# Patient Record
Sex: MALE | Race: ASIAN | NOT HISPANIC OR LATINO
[De-identification: names, ages, dates, MRNs, and addresses within clinical notes are randomized per-mention and may not be internally consistent; named-entity substitution may affect disease eponyms.]

---

## 2019-02-19 ENCOUNTER — TRANSCRIPTION ENCOUNTER (OUTPATIENT)
Age: 59
End: 2019-02-19

## 2022-01-07 ENCOUNTER — TRANSCRIPTION ENCOUNTER (OUTPATIENT)
Age: 62
End: 2022-01-07

## 2022-10-27 ENCOUNTER — INPATIENT (INPATIENT)
Facility: HOSPITAL | Age: 62
LOS: 5 days | Discharge: HOME | End: 2022-11-02
Attending: STUDENT IN AN ORGANIZED HEALTH CARE EDUCATION/TRAINING PROGRAM | Admitting: STUDENT IN AN ORGANIZED HEALTH CARE EDUCATION/TRAINING PROGRAM
Payer: COMMERCIAL

## 2022-10-27 VITALS
SYSTOLIC BLOOD PRESSURE: 129 MMHG | HEART RATE: 98 BPM | OXYGEN SATURATION: 100 % | DIASTOLIC BLOOD PRESSURE: 75 MMHG | TEMPERATURE: 98 F | RESPIRATION RATE: 18 BRPM

## 2022-10-27 PROCEDURE — 99285 EMERGENCY DEPT VISIT HI MDM: CPT

## 2022-10-27 NOTE — ED ADULT TRIAGE NOTE - CHIEF COMPLAINT QUOTE
As per daughter to translate, pt c/o vomiting and diarrhea with abdominal pain since his last chemo treatment. c/o generalized weakness, unable to tolerate PO intake and urinary frequency. Daughter states pt was told his liver enzymes were elevated, appears more jaundice. Last chemo treatment 10/15. Hx of non hodgkin's lymphoma stage IV

## 2022-10-28 DIAGNOSIS — C85.90 NON-HODGKIN LYMPHOMA, UNSPECIFIED, UNSPECIFIED SITE: ICD-10-CM

## 2022-10-28 DIAGNOSIS — R62.7 ADULT FAILURE TO THRIVE: ICD-10-CM

## 2022-10-28 DIAGNOSIS — Z86.19 PERSONAL HISTORY OF OTHER INFECTIOUS AND PARASITIC DISEASES: ICD-10-CM

## 2022-10-28 DIAGNOSIS — Z29.9 ENCOUNTER FOR PROPHYLACTIC MEASURES, UNSPECIFIED: ICD-10-CM

## 2022-10-28 DIAGNOSIS — E87.1 HYPO-OSMOLALITY AND HYPONATREMIA: ICD-10-CM

## 2022-10-28 LAB
ALBUMIN SERPL ELPH-MCNC: 3.8 G/DL — SIGNIFICANT CHANGE UP (ref 3.3–5)
ALP SERPL-CCNC: 160 U/L — HIGH (ref 40–120)
ALT FLD-CCNC: 24 U/L — SIGNIFICANT CHANGE UP (ref 4–41)
ANION GAP SERPL CALC-SCNC: 13 MMOL/L — SIGNIFICANT CHANGE UP (ref 7–14)
ANISOCYTOSIS BLD QL: SLIGHT — SIGNIFICANT CHANGE UP
APPEARANCE UR: CLEAR — SIGNIFICANT CHANGE UP
AST SERPL-CCNC: 18 U/L — SIGNIFICANT CHANGE UP (ref 4–40)
B PERT DNA SPEC QL NAA+PROBE: SIGNIFICANT CHANGE UP
B PERT+PARAPERT DNA PNL SPEC NAA+PROBE: SIGNIFICANT CHANGE UP
BACTERIA # UR AUTO: NEGATIVE — SIGNIFICANT CHANGE UP
BASE EXCESS BLDV CALC-SCNC: -0.8 MMOL/L — SIGNIFICANT CHANGE UP (ref -2–3)
BASOPHILS # BLD AUTO: 0 K/UL — SIGNIFICANT CHANGE UP (ref 0–0.2)
BASOPHILS NFR BLD AUTO: 0 % — SIGNIFICANT CHANGE UP (ref 0–2)
BILIRUB SERPL-MCNC: 0.5 MG/DL — SIGNIFICANT CHANGE UP (ref 0.2–1.2)
BILIRUB UR-MCNC: NEGATIVE — SIGNIFICANT CHANGE UP
BLOOD GAS VENOUS COMPREHENSIVE RESULT: SIGNIFICANT CHANGE UP
BORDETELLA PARAPERTUSSIS (RAPRVP): SIGNIFICANT CHANGE UP
BUN SERPL-MCNC: 14 MG/DL — SIGNIFICANT CHANGE UP (ref 7–23)
C PNEUM DNA SPEC QL NAA+PROBE: SIGNIFICANT CHANGE UP
CALCIUM SERPL-MCNC: 9.5 MG/DL — SIGNIFICANT CHANGE UP (ref 8.4–10.5)
CHLORIDE BLDV-SCNC: 100 MMOL/L — SIGNIFICANT CHANGE UP (ref 96–108)
CHLORIDE SERPL-SCNC: 96 MMOL/L — LOW (ref 98–107)
CO2 BLDV-SCNC: 25.6 MMOL/L — SIGNIFICANT CHANGE UP (ref 22–26)
CO2 SERPL-SCNC: 23 MMOL/L — SIGNIFICANT CHANGE UP (ref 22–31)
COLOR SPEC: YELLOW — SIGNIFICANT CHANGE UP
CREAT SERPL-MCNC: 1.07 MG/DL — SIGNIFICANT CHANGE UP (ref 0.5–1.3)
DIFF PNL FLD: NEGATIVE — SIGNIFICANT CHANGE UP
EGFR: 77 ML/MIN/1.73M2 — SIGNIFICANT CHANGE UP
EOSINOPHIL # BLD AUTO: 3.37 K/UL — HIGH (ref 0–0.5)
EOSINOPHIL NFR BLD AUTO: 45.6 % — HIGH (ref 0–6)
EPI CELLS # UR: 1 /HPF — SIGNIFICANT CHANGE UP (ref 0–5)
FLUAV SUBTYP SPEC NAA+PROBE: SIGNIFICANT CHANGE UP
FLUBV RNA SPEC QL NAA+PROBE: SIGNIFICANT CHANGE UP
GAS PNL BLDV: 131 MMOL/L — LOW (ref 136–145)
GLUCOSE BLDV-MCNC: 101 MG/DL — HIGH (ref 70–99)
GLUCOSE SERPL-MCNC: 105 MG/DL — HIGH (ref 70–99)
GLUCOSE UR QL: NEGATIVE — SIGNIFICANT CHANGE UP
HADV DNA SPEC QL NAA+PROBE: SIGNIFICANT CHANGE UP
HCO3 BLDV-SCNC: 24 MMOL/L — SIGNIFICANT CHANGE UP (ref 22–29)
HCOV 229E RNA SPEC QL NAA+PROBE: SIGNIFICANT CHANGE UP
HCOV HKU1 RNA SPEC QL NAA+PROBE: SIGNIFICANT CHANGE UP
HCOV NL63 RNA SPEC QL NAA+PROBE: SIGNIFICANT CHANGE UP
HCOV OC43 RNA SPEC QL NAA+PROBE: SIGNIFICANT CHANGE UP
HCT VFR BLD CALC: 41.1 % — SIGNIFICANT CHANGE UP (ref 39–50)
HCT VFR BLDA CALC: 38 % — LOW (ref 39–51)
HGB BLD CALC-MCNC: 12.8 G/DL — LOW (ref 13–17)
HGB BLD-MCNC: 13.2 G/DL — SIGNIFICANT CHANGE UP (ref 13–17)
HMPV RNA SPEC QL NAA+PROBE: SIGNIFICANT CHANGE UP
HPIV1 RNA SPEC QL NAA+PROBE: SIGNIFICANT CHANGE UP
HPIV2 RNA SPEC QL NAA+PROBE: SIGNIFICANT CHANGE UP
HPIV3 RNA SPEC QL NAA+PROBE: SIGNIFICANT CHANGE UP
HPIV4 RNA SPEC QL NAA+PROBE: SIGNIFICANT CHANGE UP
IANC: 2.83 K/UL — SIGNIFICANT CHANGE UP (ref 1.8–7.4)
KETONES UR-MCNC: NEGATIVE — SIGNIFICANT CHANGE UP
LACTATE BLDV-MCNC: 2 MMOL/L — SIGNIFICANT CHANGE UP (ref 0.5–2)
LEUKOCYTE ESTERASE UR-ACNC: NEGATIVE — SIGNIFICANT CHANGE UP
LIDOCAIN IGE QN: 34 U/L — SIGNIFICANT CHANGE UP (ref 7–60)
LYMPHOCYTES # BLD AUTO: 0.26 K/UL — LOW (ref 1–3.3)
LYMPHOCYTES # BLD AUTO: 3.5 % — LOW (ref 13–44)
M PNEUMO DNA SPEC QL NAA+PROBE: SIGNIFICANT CHANGE UP
MACROCYTES BLD QL: SLIGHT — SIGNIFICANT CHANGE UP
MANUAL SMEAR VERIFICATION: SIGNIFICANT CHANGE UP
MCHC RBC-ENTMCNC: 28.3 PG — SIGNIFICANT CHANGE UP (ref 27–34)
MCHC RBC-ENTMCNC: 32.1 GM/DL — SIGNIFICANT CHANGE UP (ref 32–36)
MCV RBC AUTO: 88.2 FL — SIGNIFICANT CHANGE UP (ref 80–100)
MONOCYTES # BLD AUTO: 1.3 K/UL — HIGH (ref 0–0.9)
MONOCYTES NFR BLD AUTO: 17.6 % — HIGH (ref 2–14)
MYELOCYTES NFR BLD: 0.9 % — HIGH (ref 0–0)
NEUTROPHILS # BLD AUTO: 2.2 K/UL — SIGNIFICANT CHANGE UP (ref 1.8–7.4)
NEUTROPHILS NFR BLD AUTO: 21.9 % — LOW (ref 43–77)
NEUTS BAND # BLD: 7.9 % — HIGH (ref 0–6)
NITRITE UR-MCNC: NEGATIVE — SIGNIFICANT CHANGE UP
OVALOCYTES BLD QL SMEAR: SLIGHT — SIGNIFICANT CHANGE UP
PCO2 BLDV: 41 MMHG — LOW (ref 42–55)
PH BLDV: 7.38 — SIGNIFICANT CHANGE UP (ref 7.32–7.43)
PH UR: 6.5 — SIGNIFICANT CHANGE UP (ref 5–8)
PLAT MORPH BLD: NORMAL — SIGNIFICANT CHANGE UP
PLATELET # BLD AUTO: 291 K/UL — SIGNIFICANT CHANGE UP (ref 150–400)
PLATELET COUNT - ESTIMATE: NORMAL — SIGNIFICANT CHANGE UP
PO2 BLDV: 30 MMHG — SIGNIFICANT CHANGE UP
POIKILOCYTOSIS BLD QL AUTO: SLIGHT — SIGNIFICANT CHANGE UP
POLYCHROMASIA BLD QL SMEAR: SLIGHT — SIGNIFICANT CHANGE UP
POTASSIUM BLDV-SCNC: 4.1 MMOL/L — SIGNIFICANT CHANGE UP (ref 3.5–5.1)
POTASSIUM SERPL-MCNC: 3.8 MMOL/L — SIGNIFICANT CHANGE UP (ref 3.5–5.3)
POTASSIUM SERPL-SCNC: 3.8 MMOL/L — SIGNIFICANT CHANGE UP (ref 3.5–5.3)
PROT SERPL-MCNC: 7.2 G/DL — SIGNIFICANT CHANGE UP (ref 6–8.3)
PROT UR-MCNC: ABNORMAL
RAPID RVP RESULT: SIGNIFICANT CHANGE UP
RBC # BLD: 4.66 M/UL — SIGNIFICANT CHANGE UP (ref 4.2–5.8)
RBC # FLD: 15.8 % — HIGH (ref 10.3–14.5)
RBC BLD AUTO: ABNORMAL
RBC CASTS # UR COMP ASSIST: 1 /HPF — SIGNIFICANT CHANGE UP (ref 0–4)
RSV RNA SPEC QL NAA+PROBE: SIGNIFICANT CHANGE UP
RV+EV RNA SPEC QL NAA+PROBE: SIGNIFICANT CHANGE UP
SAO2 % BLDV: 38.2 % — SIGNIFICANT CHANGE UP
SARS-COV-2 RNA SPEC QL NAA+PROBE: SIGNIFICANT CHANGE UP
SODIUM SERPL-SCNC: 132 MMOL/L — LOW (ref 135–145)
SP GR SPEC: >1.05 (ref 1.01–1.05)
UROBILINOGEN FLD QL: SIGNIFICANT CHANGE UP
VARIANT LYMPHS # BLD: 2.6 % — SIGNIFICANT CHANGE UP (ref 0–6)
WBC # BLD: 7.39 K/UL — SIGNIFICANT CHANGE UP (ref 3.8–10.5)
WBC # FLD AUTO: 7.39 K/UL — SIGNIFICANT CHANGE UP (ref 3.8–10.5)
WBC UR QL: 1 /HPF — SIGNIFICANT CHANGE UP (ref 0–5)

## 2022-10-28 PROCEDURE — 74177 CT ABD & PELVIS W/CONTRAST: CPT | Mod: 26,MA

## 2022-10-28 PROCEDURE — 99223 1ST HOSP IP/OBS HIGH 75: CPT

## 2022-10-28 PROCEDURE — 71046 X-RAY EXAM CHEST 2 VIEWS: CPT | Mod: 26

## 2022-10-28 RX ORDER — ONDANSETRON 8 MG/1
4 TABLET, FILM COATED ORAL EVERY 8 HOURS
Refills: 0 | Status: DISCONTINUED | OUTPATIENT
Start: 2022-10-28 | End: 2022-11-02

## 2022-10-28 RX ORDER — SODIUM CHLORIDE 9 MG/ML
1000 INJECTION INTRAMUSCULAR; INTRAVENOUS; SUBCUTANEOUS
Refills: 0 | Status: DISCONTINUED | OUTPATIENT
Start: 2022-10-28 | End: 2022-10-30

## 2022-10-28 RX ORDER — OXYBUTYNIN CHLORIDE 5 MG
5 TABLET ORAL DAILY
Refills: 0 | Status: DISCONTINUED | OUTPATIENT
Start: 2022-10-28 | End: 2022-11-02

## 2022-10-28 RX ORDER — SENNA PLUS 8.6 MG/1
2 TABLET ORAL AT BEDTIME
Refills: 0 | Status: DISCONTINUED | OUTPATIENT
Start: 2022-10-28 | End: 2022-10-28

## 2022-10-28 RX ORDER — ACETAMINOPHEN 500 MG
650 TABLET ORAL EVERY 6 HOURS
Refills: 0 | Status: DISCONTINUED | OUTPATIENT
Start: 2022-10-28 | End: 2022-11-02

## 2022-10-28 RX ORDER — ENTECAVIR 0.5 MG/1
0.5 TABLET ORAL DAILY
Refills: 0 | Status: DISCONTINUED | OUTPATIENT
Start: 2022-10-28 | End: 2022-11-02

## 2022-10-28 RX ORDER — ENOXAPARIN SODIUM 100 MG/ML
40 INJECTION SUBCUTANEOUS EVERY 24 HOURS
Refills: 0 | Status: DISCONTINUED | OUTPATIENT
Start: 2022-10-28 | End: 2022-11-02

## 2022-10-28 RX ORDER — TAMSULOSIN HYDROCHLORIDE 0.4 MG/1
0.4 CAPSULE ORAL AT BEDTIME
Refills: 0 | Status: DISCONTINUED | OUTPATIENT
Start: 2022-10-28 | End: 2022-11-02

## 2022-10-28 RX ORDER — SODIUM CHLORIDE 9 MG/ML
1000 INJECTION INTRAMUSCULAR; INTRAVENOUS; SUBCUTANEOUS ONCE
Refills: 0 | Status: COMPLETED | OUTPATIENT
Start: 2022-10-28 | End: 2022-10-28

## 2022-10-28 RX ORDER — FAMOTIDINE 10 MG/ML
20 INJECTION INTRAVENOUS DAILY
Refills: 0 | Status: DISCONTINUED | OUTPATIENT
Start: 2022-10-28 | End: 2022-11-02

## 2022-10-28 RX ORDER — ATORVASTATIN CALCIUM 80 MG/1
20 TABLET, FILM COATED ORAL AT BEDTIME
Refills: 0 | Status: DISCONTINUED | OUTPATIENT
Start: 2022-10-28 | End: 2022-11-02

## 2022-10-28 RX ORDER — AMITRIPTYLINE HCL 25 MG
10 TABLET ORAL DAILY
Refills: 0 | Status: DISCONTINUED | OUTPATIENT
Start: 2022-10-28 | End: 2022-11-02

## 2022-10-28 RX ORDER — OXYBUTYNIN CHLORIDE 5 MG
5 TABLET ORAL DAILY
Refills: 0 | Status: DISCONTINUED | OUTPATIENT
Start: 2022-10-28 | End: 2022-10-28

## 2022-10-28 RX ORDER — ACYCLOVIR SODIUM 500 MG
400 VIAL (EA) INTRAVENOUS
Refills: 0 | Status: DISCONTINUED | OUTPATIENT
Start: 2022-10-28 | End: 2022-11-02

## 2022-10-28 RX ADMIN — ENTECAVIR 0.5 MILLIGRAM(S): 0.5 TABLET ORAL at 13:20

## 2022-10-28 RX ADMIN — Medication 650 MILLIGRAM(S): at 09:20

## 2022-10-28 RX ADMIN — Medication 650 MILLIGRAM(S): at 17:58

## 2022-10-28 RX ADMIN — SODIUM CHLORIDE 75 MILLILITER(S): 9 INJECTION INTRAMUSCULAR; INTRAVENOUS; SUBCUTANEOUS at 10:48

## 2022-10-28 RX ADMIN — Medication 400 MILLIGRAM(S): at 17:15

## 2022-10-28 RX ADMIN — ONDANSETRON 4 MILLIGRAM(S): 8 TABLET, FILM COATED ORAL at 17:58

## 2022-10-28 RX ADMIN — ATORVASTATIN CALCIUM 20 MILLIGRAM(S): 80 TABLET, FILM COATED ORAL at 22:19

## 2022-10-28 RX ADMIN — TAMSULOSIN HYDROCHLORIDE 0.4 MILLIGRAM(S): 0.4 CAPSULE ORAL at 22:19

## 2022-10-28 RX ADMIN — Medication 30 MILLILITER(S): at 09:20

## 2022-10-28 RX ADMIN — FAMOTIDINE 20 MILLIGRAM(S): 10 INJECTION INTRAVENOUS at 11:28

## 2022-10-28 RX ADMIN — Medication 5 MILLIGRAM(S): at 13:20

## 2022-10-28 RX ADMIN — SODIUM CHLORIDE 1000 MILLILITER(S): 9 INJECTION INTRAMUSCULAR; INTRAVENOUS; SUBCUTANEOUS at 01:02

## 2022-10-28 RX ADMIN — ENOXAPARIN SODIUM 40 MILLIGRAM(S): 100 INJECTION SUBCUTANEOUS at 11:28

## 2022-10-28 RX ADMIN — Medication 650 MILLIGRAM(S): at 09:50

## 2022-10-28 NOTE — H&P ADULT - PROBLEM SELECTOR PLAN 2
CT of abdomen showed Multifocal homogeneous soft tissue masses involving the upper para-aortic retroperitoneum and central mesentery with encasement of regional vessels. Lymphoma is a primary differential diagnostic consideration.  Non Hodgkin's Lymphoma on Rituxan chemotherapy treatment #7 two weeks ago.  Oncology consult for followup  Followup with oncologist Dr Cantu as outpatient.

## 2022-10-28 NOTE — ED ADULT NURSE NOTE - CHIEF COMPLAINT

## 2022-10-28 NOTE — H&P ADULT - PROBLEM SELECTOR PLAN 4
- Na 132 - mild hyponatremia  - IVF hydration  - Strict I's & O's  - Free water restriction.   - Check urine studies (Na, Cr, Osm) check serum osmolality if worsens.  - will monitor and trend Na level. - Na 132 - mild hyponatremia  - gentle IVF hydration  - Check urine studies (Na, Cr, Osm) check serum osmolality if worsens.  - will monitor and trend Na level.

## 2022-10-28 NOTE — H&P ADULT - NS ATTEND AMEND GEN_ALL_CORE FT
61 y/o male with PMH of Non Hodgkin's Lymphoma on Rituxan chemotherapy treatment #7 two weeks ago, HBV, TIA  presenting with vomiting and decrease appetite. Patient admitted for management of failure to thrive.    Patient see flex examined with daughter at bedside. Reports symptoms started after the 6th round of chemo but never recover back to baseline prior to 7th chemo. Symptoms worsened after the 7th chemo. Loose stool now improving, only had 1 since arriving to ED yesterday afternoon. No fever, chills, cough, dysuria. Ate some crackers this morning and was okay. Denies dysphagia. Just poor appetite. Mild abd pain on palpation on the LLQ. No rebound tenderness    #Failure to thrive: likely in the setting of chemo. ROS only noted for loose BMs, which is now improving. If worsened, would send off stool studies. Nutrition consult. Encourage PO intake. Famotidine given bloating and hx of GERD.    #Non-hodgkin lymphoma: s/p 7th rituxan two weeks ago. Per family, plan for next rituxan in december. Outpt onc f/u    #hx of hep b: on entecavir. elevated alk koki. LFTs wnl. Follows with hepatology outpt    #Hyponatremia: possibly 2/2 dehydration. s/p IVF. Check urine studies if persisted or worsened. Monitor Na 63 y/o male with PMH of Non Hodgkin's Lymphoma on Rituxan chemotherapy treatment #7 two weeks ago, HBV, TIA  presenting with vomiting and decrease appetite. Patient admitted for management of failure to thrive.    Patient see flex examined with daughter at bedside. Reports symptoms started after the 6th round of chemo but never recover back to baseline prior to 7th chemo. Symptoms worsened after the 7th chemo. Loose stool now improving, only had 1 since arriving to ED yesterday afternoon. No fever, chills, cough, dysuria. Ate some crackers this morning and was okay. Denies dysphagia. Just poor appetite. Mild abd pain on palpation on the LLQ. No rebound tenderness    #Failure to thrive: likely in the setting of chemo. ROS only noted for loose BMs, which is now improving. If worsened, would send off stool studies. Nutrition consult. Encourage PO intake. Famotidine given bloating and hx of GERD.    #Non-hodgkin lymphoma: s/p 7th rituxan two weeks ago. Per family, plan for next rituxan in december. Per daughter, the lymphoma mass is responding to treatment and has reduced in size significantly. Outpt onc f/u    #hx of hep b: on entecavir. elevated alk koki. LFTs wnl. Follows with hepatology outpt    #Hyponatremia: possibly 2/2 dehydration. s/p IVF. Check urine studies if persisted or worsened. Monitor Na 63 y/o male with PMH of Non Hodgkin's Lymphoma on Rituxan chemotherapy treatment #7 two weeks ago, HBV, TIA  presenting with vomiting and decrease appetite. Patient admitted for management of failure to thrive.    Patient see flex examined with daughter at bedside. Reports symptoms started after the 6th round of chemo but never recover back to baseline prior to 7th chemo. Symptoms worsened after the 7th chemo. Loose stool now improving, only had 1 since arriving to ED yesterday afternoon. No fever, chills, cough, dysuria. Ate some crackers this morning and was okay. Denies dysphagia. Just poor appetite. Mild abd pain on palpation on the LLQ. No rebound tenderness    #Failure to thrive: likely in the setting of chemo. ROS only noted for loose BMs, which is now improving. If worsened, would send off stool studies. Nutrition consult. Encourage PO intake. Famotidine given bloating and hx of GERD.    #Non-hodgkin lymphoma: s/p 7th rituxan two weeks ago. Per family, plan for next rituxan in december. Per daughter, the lymphoma mass is responding to treatment and has reduced in size significantly. CT A&P result reviewed. Will try to obtain outpt record for comparison. Outpt onc f/u    #hx of hep b: on entecavir. elevated alk koki. LFTs wnl. Follows with hepatology outpt    #Hyponatremia: possibly 2/2 dehydration. s/p IVF. Check urine studies if persisted or worsened. Monitor Na

## 2022-10-28 NOTE — ED ADULT NURSE REASSESSMENT NOTE - NS ED NURSE REASSESS COMMENT FT1
Received report from KUSHAL Rosado for patient in room 14. Patient is A&Ox4 and ambulatory at baseline. VSS, patient endorses generalized abdominal pain, rates pain 5/10. Bowel sounds are normoactive, abdomen is soft and non-distended, patient denies nausea/vomitting and gas. MD aware, medications administered as prescribed. Will continue to monitor.
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
## 2022-10-28 NOTE — ED ADULT NURSE NOTE - OBJECTIVE STATEMENT
break coverage RN - Pt received in room 14, A&OX3, daughter at bedside for translation. pt c/o vomiting and diarrhea with abdominal pain since his last chemo treatment on 10/15. Also c/o generalized weakness, unable to tolerate PO intake and urinary frequency. Daughter states pt was told his liver enzymes were elevated last time he had blood work drawn, and that he appears more jaundice than usual. Abd soft non distended. Resp even and unlabored. 20G IV placed to L AC. Labs drawn and sent. Will continue to monitor.
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
## 2022-10-28 NOTE — ED PROVIDER NOTE - PROGRESS NOTE DETAILS
Mehul Ramirez MD  CTAP showed Multifocal homogeneous soft tissue masses involving the upper para-aortic   retroperitoneum and central mesentery with encasement of regional   vessels. Labs and CT not suggestive of colitis or other infectious etiology. Patient has been feeling he has been continually declining over the past month, he was able to work with some functioning up until the 6th chemo treatment. He and his daughter are concerned about his decline and level of weakness and inability to take in PO. Will seek medicine admit.

## 2022-10-28 NOTE — ED PROVIDER NOTE - NS ED ROS FT
General: WEAKNESS, denies fever, chills  HENT: denies nasal congestion, rhinorrhea  Eyes: denies visual changes, blurred vision  CV: denies chest pain, palpitations  Resp: SOB, denies cough  Abdominal: ABD PAIN, NAUSEA, VOMITING, DIARRHEA  : INCREASED URINARY FREQUENCY, denies urinary pain or discharge, hematuria  MSK: denies muscle aches, leg swelling  Neuro: denies headaches, numbness, tingling  Skin: denies rashes, bruises

## 2022-10-28 NOTE — H&P ADULT - ASSESSMENT
63 y/o male with PMH of Non Hodgkin's Lymphoma on Rituxan chemotherapy treatment #7 two weeks ago, HBV, TIA  presenting with vomiting and decrease appetite. Patient admitted for management of failure to thrive.    In ED, CT of abdomen showed Multifocal homogeneous soft tissue masses involving the upper para-aortic retroperitoneum and central mesentery with encasement of regional vessels. Lymphoma is a primary differential diagnostic consideration,   however other etiologies are not excluded.

## 2022-10-28 NOTE — H&P ADULT - PROBLEM SELECTOR PLAN 1
Patient with decrease appetite, decrease oral intake  Encourage oral intake with fluids and advance as tolerated  Nutritional support with supplements  Calorie count  IVF hydration until oral intake increases.

## 2022-10-28 NOTE — H&P ADULT - PROBLEM SELECTOR PLAN 5
DVT prophylaxis Lovenox 40 mg SC QD.  Will check stool GI PCR and culture if loose stool. DVT prophylaxis Lovenox 40 mg SC QD.  Will check stool GI PCR and culture if loose stool worsened or persist

## 2022-10-28 NOTE — ED PROVIDER NOTE - PHYSICAL EXAMINATION
GENERAL: UNCOMFORTABLE APPEARING  HEAD: normocephalic, atraumatic  HEENT: normal conjunctiva, oral mucosa moist, uvula midline, no tonsilar exudates, neck supple, no JVD  CARDIAC: regular rate and rhythm, normal S1S2, no appreciable murmurs, 2+ pulses in UE/LE b/l  PULM: normal breath sounds, clear to ascultation bilaterally, no rales, rhonchi, wheezing  GI: ABD TENDER, abdomen nondistended, soft, no guarding, rebound tenderness  : no CVA tenderness b/l, no suprapubic tenderness  NEURO: no focal motor or sensory deficits, normal speech, PERRLA, EOMI, AAOx3  MSK: no peripheral edema, no calf tenderness b/l  SKIN: well-perfused, extremities warm, no visible rashes  PSYCH: appropriate mood and affect
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
## 2022-10-29 DIAGNOSIS — A41.9 SEPSIS, UNSPECIFIED ORGANISM: ICD-10-CM

## 2022-10-29 LAB
A1C WITH ESTIMATED AVERAGE GLUCOSE RESULT: 5.9 % — HIGH (ref 4–5.6)
ALBUMIN SERPL ELPH-MCNC: 4.1 G/DL — SIGNIFICANT CHANGE UP (ref 3.3–5)
ALP SERPL-CCNC: 211 U/L — HIGH (ref 40–120)
ALT FLD-CCNC: 33 U/L — SIGNIFICANT CHANGE UP (ref 4–41)
ANION GAP SERPL CALC-SCNC: 13 MMOL/L — SIGNIFICANT CHANGE UP (ref 7–14)
ANION GAP SERPL CALC-SCNC: 18 MMOL/L — HIGH (ref 7–14)
AST SERPL-CCNC: 22 U/L — SIGNIFICANT CHANGE UP (ref 4–40)
BILIRUB SERPL-MCNC: 0.8 MG/DL — SIGNIFICANT CHANGE UP (ref 0.2–1.2)
BLOOD GAS VENOUS COMPREHENSIVE RESULT: SIGNIFICANT CHANGE UP
BUN SERPL-MCNC: 14 MG/DL — SIGNIFICANT CHANGE UP (ref 7–23)
BUN SERPL-MCNC: 14 MG/DL — SIGNIFICANT CHANGE UP (ref 7–23)
CALCIUM SERPL-MCNC: 9.2 MG/DL — SIGNIFICANT CHANGE UP (ref 8.4–10.5)
CALCIUM SERPL-MCNC: 9.6 MG/DL — SIGNIFICANT CHANGE UP (ref 8.4–10.5)
CHLORIDE SERPL-SCNC: 101 MMOL/L — SIGNIFICANT CHANGE UP (ref 98–107)
CHLORIDE SERPL-SCNC: 98 MMOL/L — SIGNIFICANT CHANGE UP (ref 98–107)
CHOLEST SERPL-MCNC: 82 MG/DL — SIGNIFICANT CHANGE UP
CO2 SERPL-SCNC: 18 MMOL/L — LOW (ref 22–31)
CO2 SERPL-SCNC: 21 MMOL/L — LOW (ref 22–31)
CREAT SERPL-MCNC: 1.17 MG/DL — SIGNIFICANT CHANGE UP (ref 0.5–1.3)
CREAT SERPL-MCNC: 1.19 MG/DL — SIGNIFICANT CHANGE UP (ref 0.5–1.3)
EGFR: 68 ML/MIN/1.73M2 — SIGNIFICANT CHANGE UP
EGFR: 70 ML/MIN/1.73M2 — SIGNIFICANT CHANGE UP
ESTIMATED AVERAGE GLUCOSE: 123 — SIGNIFICANT CHANGE UP
GLUCOSE SERPL-MCNC: 100 MG/DL — HIGH (ref 70–99)
GLUCOSE SERPL-MCNC: 116 MG/DL — HIGH (ref 70–99)
HCT VFR BLD CALC: 36.7 % — LOW (ref 39–50)
HCT VFR BLD CALC: 38.2 % — LOW (ref 39–50)
HCT VFR BLD CALC: 41.3 % — SIGNIFICANT CHANGE UP (ref 39–50)
HCV AB S/CO SERPL IA: 0.06 S/CO — SIGNIFICANT CHANGE UP (ref 0–0.99)
HCV AB SERPL-IMP: SIGNIFICANT CHANGE UP
HDLC SERPL-MCNC: 21 MG/DL — LOW
HGB BLD-MCNC: 12 G/DL — LOW (ref 13–17)
HGB BLD-MCNC: 12.2 G/DL — LOW (ref 13–17)
HGB BLD-MCNC: 13 G/DL — SIGNIFICANT CHANGE UP (ref 13–17)
LACTATE SERPL-SCNC: 1 MMOL/L — SIGNIFICANT CHANGE UP (ref 0.5–2)
LIPID PNL WITH DIRECT LDL SERPL: 31 MG/DL — SIGNIFICANT CHANGE UP
MAGNESIUM SERPL-MCNC: 2.1 MG/DL — SIGNIFICANT CHANGE UP (ref 1.6–2.6)
MAGNESIUM SERPL-MCNC: 2.1 MG/DL — SIGNIFICANT CHANGE UP (ref 1.6–2.6)
MCHC RBC-ENTMCNC: 27.3 PG — SIGNIFICANT CHANGE UP (ref 27–34)
MCHC RBC-ENTMCNC: 28 PG — SIGNIFICANT CHANGE UP (ref 27–34)
MCHC RBC-ENTMCNC: 28.2 PG — SIGNIFICANT CHANGE UP (ref 27–34)
MCHC RBC-ENTMCNC: 31.5 GM/DL — LOW (ref 32–36)
MCHC RBC-ENTMCNC: 31.9 GM/DL — LOW (ref 32–36)
MCHC RBC-ENTMCNC: 32.7 GM/DL — SIGNIFICANT CHANGE UP (ref 32–36)
MCV RBC AUTO: 85.7 FL — SIGNIFICANT CHANGE UP (ref 80–100)
MCV RBC AUTO: 86.8 FL — SIGNIFICANT CHANGE UP (ref 80–100)
MCV RBC AUTO: 88.2 FL — SIGNIFICANT CHANGE UP (ref 80–100)
NON HDL CHOLESTEROL: 61 MG/DL — SIGNIFICANT CHANGE UP
NRBC # BLD: 0 /100 WBCS — SIGNIFICANT CHANGE UP (ref 0–0)
NRBC # FLD: 0 K/UL — SIGNIFICANT CHANGE UP (ref 0–0)
PHOSPHATE SERPL-MCNC: 4.2 MG/DL — SIGNIFICANT CHANGE UP (ref 2.5–4.5)
PLATELET # BLD AUTO: 264 K/UL — SIGNIFICANT CHANGE UP (ref 150–400)
PLATELET # BLD AUTO: 288 K/UL — SIGNIFICANT CHANGE UP (ref 150–400)
PLATELET # BLD AUTO: 300 K/UL — SIGNIFICANT CHANGE UP (ref 150–400)
POTASSIUM SERPL-MCNC: 3.4 MMOL/L — LOW (ref 3.5–5.3)
POTASSIUM SERPL-MCNC: 4 MMOL/L — SIGNIFICANT CHANGE UP (ref 3.5–5.3)
POTASSIUM SERPL-SCNC: 3.4 MMOL/L — LOW (ref 3.5–5.3)
POTASSIUM SERPL-SCNC: 4 MMOL/L — SIGNIFICANT CHANGE UP (ref 3.5–5.3)
PROT SERPL-MCNC: 7.5 G/DL — SIGNIFICANT CHANGE UP (ref 6–8.3)
RBC # BLD: 4.28 M/UL — SIGNIFICANT CHANGE UP (ref 4.2–5.8)
RBC # BLD: 4.33 M/UL — SIGNIFICANT CHANGE UP (ref 4.2–5.8)
RBC # BLD: 4.76 M/UL — SIGNIFICANT CHANGE UP (ref 4.2–5.8)
RBC # FLD: 15.3 % — HIGH (ref 10.3–14.5)
RBC # FLD: 15.3 % — HIGH (ref 10.3–14.5)
RBC # FLD: 15.4 % — HIGH (ref 10.3–14.5)
SODIUM SERPL-SCNC: 134 MMOL/L — LOW (ref 135–145)
SODIUM SERPL-SCNC: 135 MMOL/L — SIGNIFICANT CHANGE UP (ref 135–145)
TRIGL SERPL-MCNC: 150 MG/DL — HIGH
WBC # BLD: 3.6 K/UL — LOW (ref 3.8–10.5)
WBC # BLD: 4.38 K/UL — SIGNIFICANT CHANGE UP (ref 3.8–10.5)
WBC # BLD: 4.67 K/UL — SIGNIFICANT CHANGE UP (ref 3.8–10.5)
WBC # FLD AUTO: 3.6 K/UL — LOW (ref 3.8–10.5)
WBC # FLD AUTO: 4.38 K/UL — SIGNIFICANT CHANGE UP (ref 3.8–10.5)
WBC # FLD AUTO: 4.67 K/UL — SIGNIFICANT CHANGE UP (ref 3.8–10.5)

## 2022-10-29 PROCEDURE — 99233 SBSQ HOSP IP/OBS HIGH 50: CPT

## 2022-10-29 RX ORDER — VANCOMYCIN HCL 1 G
1000 VIAL (EA) INTRAVENOUS ONCE
Refills: 0 | Status: COMPLETED | OUTPATIENT
Start: 2022-10-29 | End: 2022-10-29

## 2022-10-29 RX ORDER — PIPERACILLIN AND TAZOBACTAM 4; .5 G/20ML; G/20ML
3.38 INJECTION, POWDER, LYOPHILIZED, FOR SOLUTION INTRAVENOUS EVERY 8 HOURS
Refills: 0 | Status: DISCONTINUED | OUTPATIENT
Start: 2022-10-29 | End: 2022-10-30

## 2022-10-29 RX ORDER — PIPERACILLIN AND TAZOBACTAM 4; .5 G/20ML; G/20ML
3.38 INJECTION, POWDER, LYOPHILIZED, FOR SOLUTION INTRAVENOUS ONCE
Refills: 0 | Status: COMPLETED | OUTPATIENT
Start: 2022-10-29 | End: 2022-10-29

## 2022-10-29 RX ORDER — SODIUM CHLORIDE 9 MG/ML
1000 INJECTION INTRAMUSCULAR; INTRAVENOUS; SUBCUTANEOUS ONCE
Refills: 0 | Status: COMPLETED | OUTPATIENT
Start: 2022-10-29 | End: 2022-10-29

## 2022-10-29 RX ORDER — MORPHINE SULFATE 50 MG/1
2 CAPSULE, EXTENDED RELEASE ORAL EVERY 4 HOURS
Refills: 0 | Status: DISCONTINUED | OUTPATIENT
Start: 2022-10-29 | End: 2022-10-30

## 2022-10-29 RX ORDER — POTASSIUM CHLORIDE 20 MEQ
40 PACKET (EA) ORAL ONCE
Refills: 0 | Status: COMPLETED | OUTPATIENT
Start: 2022-10-29 | End: 2022-10-29

## 2022-10-29 RX ORDER — SODIUM CHLORIDE 9 MG/ML
1000 INJECTION INTRAMUSCULAR; INTRAVENOUS; SUBCUTANEOUS
Refills: 0 | Status: DISCONTINUED | OUTPATIENT
Start: 2022-10-29 | End: 2022-11-01

## 2022-10-29 RX ADMIN — Medication 250 MILLIGRAM(S): at 06:37

## 2022-10-29 RX ADMIN — Medication 650 MILLIGRAM(S): at 05:26

## 2022-10-29 RX ADMIN — Medication 40 MILLIEQUIVALENT(S): at 06:37

## 2022-10-29 RX ADMIN — ENOXAPARIN SODIUM 40 MILLIGRAM(S): 100 INJECTION SUBCUTANEOUS at 12:48

## 2022-10-29 RX ADMIN — PIPERACILLIN AND TAZOBACTAM 25 GRAM(S): 4; .5 INJECTION, POWDER, LYOPHILIZED, FOR SOLUTION INTRAVENOUS at 19:08

## 2022-10-29 RX ADMIN — PIPERACILLIN AND TAZOBACTAM 25 GRAM(S): 4; .5 INJECTION, POWDER, LYOPHILIZED, FOR SOLUTION INTRAVENOUS at 10:50

## 2022-10-29 RX ADMIN — MORPHINE SULFATE 2 MILLIGRAM(S): 50 CAPSULE, EXTENDED RELEASE ORAL at 14:52

## 2022-10-29 RX ADMIN — FAMOTIDINE 20 MILLIGRAM(S): 10 INJECTION INTRAVENOUS at 12:48

## 2022-10-29 RX ADMIN — ENTECAVIR 0.5 MILLIGRAM(S): 0.5 TABLET ORAL at 12:48

## 2022-10-29 RX ADMIN — PIPERACILLIN AND TAZOBACTAM 200 GRAM(S): 4; .5 INJECTION, POWDER, LYOPHILIZED, FOR SOLUTION INTRAVENOUS at 07:52

## 2022-10-29 RX ADMIN — SODIUM CHLORIDE 100 MILLILITER(S): 9 INJECTION INTRAMUSCULAR; INTRAVENOUS; SUBCUTANEOUS at 15:52

## 2022-10-29 RX ADMIN — SODIUM CHLORIDE 1000 MILLILITER(S): 9 INJECTION INTRAMUSCULAR; INTRAVENOUS; SUBCUTANEOUS at 14:53

## 2022-10-29 RX ADMIN — Medication 5 MILLIGRAM(S): at 12:48

## 2022-10-29 RX ADMIN — Medication 650 MILLIGRAM(S): at 04:27

## 2022-10-29 RX ADMIN — Medication 400 MILLIGRAM(S): at 05:26

## 2022-10-29 RX ADMIN — Medication 400 MILLIGRAM(S): at 18:38

## 2022-10-29 NOTE — PATIENT PROFILE ADULT - FALL HARM RISK - RISK INTERVENTIONS

## 2022-10-29 NOTE — PROGRESS NOTE ADULT - PROBLEM SELECTOR PLAN 3
CT of abdomen showed Multifocal homogeneous soft tissue masses involving the upper para-aortic retroperitoneum and central mesentery with encasement of regional vessels. Lymphoma is a primary differential diagnostic consideration.  Non Hodgkin's Lymphoma on Rituxan chemotherapy treatment #7 two weeks ago.  Follow up with oncologist Dr Cantu as outpatient.

## 2022-10-29 NOTE — PROGRESS NOTE ADULT - PROBLEM SELECTOR PLAN 1
Fever and tachycardia overnight, leukocytosis on admission  -f/u blood cultures  -UA and RVP neg  -CXR clear lungs  -empirically started on Zosyn  -will give 2L IVF  -pt with continued diarrhea- check GI stool PCR and C.diff

## 2022-10-29 NOTE — DIETITIAN INITIAL EVALUATION ADULT - PERTINENT LABORATORY DATA
oral 10-29    134<L>  |  98  |  14  ----------------------------<  116<H>  3.4<L>   |  18<L>  |  1.19    Ca    9.6      29 Oct 2022 05:02  Phos  4.2     10-28  Mg     2.10     10-29    TPro  7.5  /  Alb  4.1  /  TBili  0.8  /  DBili  x   /  AST  22  /  ALT  33  /  AlkPhos  211<H>  10-29  A1C with Estimated Average Glucose Result: 5.9 % (10-29-22 @ 05:02)   show

## 2022-10-29 NOTE — CHART NOTE - NSCHARTNOTEFT_GEN_A_CORE
[facility] [employer].    Notified by RN, pt slightly tachycardic to 109bpm, however oral temp noted to be 100.2F. Rectal temp obtained 101.9F. PRN Tylenol to be given.   Per chart review, patient without blood cultures or lactate. However CXR, RVP, and UA performed and negative for any infectious source.   Blood Cultures x 2 and VBG ordered with AM labs to be done.   Temperature to be rechecked-- will hold off on further rectal temps as patient neutropenic.  Once AM Labs drawn, will order empiric coverage: Vanco/Zosyn x1.   Will continue to monitor overnight and follow up labs.     Vital Signs Last 24 Hrs  T(C): 38.6 (29 Oct 2022 03:41), Max: 38.6 (29 Oct 2022 03:41)  T(F): 101.5 (29 Oct 2022 03:41), Max: 101.5 (29 Oct 2022 03:41)  HR: 109 (29 Oct 2022 03:08) (80 - 109)  BP: 132/75 (29 Oct 2022 03:08) (113/58 - 132/75)  RR: 18 (29 Oct 2022 03:08) (18 - 19)  SpO2: 96% (29 Oct 2022 03:08) (96% - 100%)    Parameters below as of 29 Oct 2022 03:08  Patient On (Oxygen Delivery Method): room air    [ ] f/u sepsis work-up: BCx, VBG  [ ] Order Vanco and Zosyn x 1 for empiric coverage  [ ] rpt oral temp, s/p PO Tylenol    Tata Branham PA  Medicine [id number] [facility].    Notified by RN, pt slightly tachycardic to 109bpm, however oral temp noted to be 100.2F. Rectal temp obtained 101.5F. PRN Tylenol to be given.   Per chart review, patient without blood cultures or lactate. However CXR, RVP, and UA performed and negative for any infectious source.   Blood Cultures x 2 and VBG ordered with AM labs to be done.   Temperature to be rechecked-- will hold off on further rectal temps as patient neutropenic.  Once AM Labs drawn, will order empiric coverage: Vanco/Zosyn x1.   Will continue to monitor overnight and follow up labs.     Vital Signs Last 24 Hrs  T(C): 38.6 (29 Oct 2022 03:41), Max: 38.6 (29 Oct 2022 03:41)  T(F): 101.5 (29 Oct 2022 03:41), Max: 101.5 (29 Oct 2022 03:41)  HR: 109 (29 Oct 2022 03:08) (80 - 109)  BP: 132/75 (29 Oct 2022 03:08) (113/58 - 132/75)  RR: 18 (29 Oct 2022 03:08) (18 - 19)  SpO2: 96% (29 Oct 2022 03:08) (96% - 100%)    Parameters below as of 29 Oct 2022 03:08  Patient On (Oxygen Delivery Method): room air    [ ] f/u sepsis work-up: BCx, VBG  [ ] Order Vanco and Zosyn x 1 for empiric coverage  [ ] rpt oral temp, s/p PO Tylenol    Tata Branham PA  Medicine [id number]

## 2022-10-29 NOTE — DIETITIAN INITIAL EVALUATION ADULT - NS FNS DIET ORDER
Diet, Regular:   No Pork  Supplement Feeding Modality:  Oral  Ensure Enlive Cans or Servings Per Day:  2       Frequency:  Daily (10-29-22 @ 14:40)

## 2022-10-29 NOTE — DIETITIAN INITIAL EVALUATION ADULT - OTHER INFO
61 y/o male with PMH of Non Hodgkin's Lymphoma on Rituxan chemotherapy treatment #7 two weeks ago, HBV, TIA  presenting with vomiting and decrease appetite. Patient admitted for management of failure to thrive.  Pt seen with son at bedside, reports poor appetite due to c/o vomiting & diarrhea x 5 days pta.  Improved  nausea/vomiting at present but remains with poor appetite due to diarrhea. Pt Reports x10 watery diarrhea last night. Pt refusing to drink supplement Ensure Plus as ordered due to diarrhea right after he eats or drinks anything.  Encouraged pt to eat banana to help bind the stool. Pt not sure of his UBW. Current wt- 140.8# (10/29/22). As per son pt has lost wt since chemotherapy started but not sure of how much. Offered Orgaine (plant based), pt will try it. Discontinue Ensure Plus as ordered and pt not drinking it. Pt is at high risk for malnutrition but not able to code at this time due in sufficient criteria available.

## 2022-10-29 NOTE — PROGRESS NOTE ADULT - ASSESSMENT
63 y/o male with PMH of Non Hodgkin's Lymphoma on Rituxan chemotherapy treatment #7 two weeks ago, HBV, TIA p/w N/V/D. Septic overnight on 10/28.

## 2022-10-29 NOTE — DIETITIAN INITIAL EVALUATION ADULT - PERTINENT MEDS FT
MEDICATIONS  (STANDING):  acyclovir   Oral Tab/Cap 400 milliGRAM(s) Oral two times a day  amitriptyline 10 milliGRAM(s) Oral daily  atorvastatin 20 milliGRAM(s) Oral at bedtime  enoxaparin Injectable 40 milliGRAM(s) SubCutaneous every 24 hours  entecavir 0.5 milliGRAM(s) Oral daily  famotidine    Tablet 20 milliGRAM(s) Oral daily  oxybutynin XL 5 milliGRAM(s) Oral daily  piperacillin/tazobactam IVPB.- 3.375 Gram(s) IV Intermittent once  piperacillin/tazobactam IVPB.. 3.375 Gram(s) IV Intermittent every 8 hours  sodium chloride 0.9%. 1000 milliLiter(s) (100 mL/Hr) IV Continuous <Continuous>  sodium chloride 0.9%. 1000 milliLiter(s) (75 mL/Hr) IV Continuous <Continuous>  tamsulosin 0.4 milliGRAM(s) Oral at bedtime    MEDICATIONS  (PRN):  acetaminophen     Tablet .. 650 milliGRAM(s) Oral every 6 hours PRN Temp greater or equal to 38C (100.4F), Mild Pain (1 - 3)  aluminum hydroxide/magnesium hydroxide/simethicone Suspension 30 milliLiter(s) Oral every 4 hours PRN Dyspepsia  morphine  - Injectable 2 milliGRAM(s) IV Push every 4 hours PRN Severe Pain (7 - 10)  ondansetron Injectable 4 milliGRAM(s) IV Push every 8 hours PRN Nausea and/or Vomiting

## 2022-10-30 DIAGNOSIS — K52.9 NONINFECTIVE GASTROENTERITIS AND COLITIS, UNSPECIFIED: ICD-10-CM

## 2022-10-30 LAB
ANION GAP SERPL CALC-SCNC: 11 MMOL/L — SIGNIFICANT CHANGE UP (ref 7–14)
BASOPHILS # BLD AUTO: 0.03 K/UL — SIGNIFICANT CHANGE UP (ref 0–0.2)
BASOPHILS NFR BLD AUTO: 0.8 % — SIGNIFICANT CHANGE UP (ref 0–2)
BUN SERPL-MCNC: 14 MG/DL — SIGNIFICANT CHANGE UP (ref 7–23)
C DIFF BY PCR RESULT: SIGNIFICANT CHANGE UP
CALCIUM SERPL-MCNC: 9 MG/DL — SIGNIFICANT CHANGE UP (ref 8.4–10.5)
CAMPYLOBACTER DNA SPEC NAA+PROBE: DETECTED
CHLORIDE SERPL-SCNC: 103 MMOL/L — SIGNIFICANT CHANGE UP (ref 98–107)
CO2 SERPL-SCNC: 21 MMOL/L — LOW (ref 22–31)
CREAT SERPL-MCNC: 1.06 MG/DL — SIGNIFICANT CHANGE UP (ref 0.5–1.3)
EGFR: 78 ML/MIN/1.73M2 — SIGNIFICANT CHANGE UP
EOSINOPHIL # BLD AUTO: 1.26 K/UL — HIGH (ref 0–0.5)
EOSINOPHIL NFR BLD AUTO: 33.1 % — HIGH (ref 0–6)
EPEC DNA STL QL NAA+PROBE: DETECTED
GI PCR PANEL: DETECTED
GLUCOSE SERPL-MCNC: 113 MG/DL — HIGH (ref 70–99)
HCT VFR BLD CALC: 33.6 % — LOW (ref 39–50)
HGB BLD-MCNC: 11 G/DL — LOW (ref 13–17)
IANC: 1.34 K/UL — LOW (ref 1.8–7.4)
IMM GRANULOCYTES NFR BLD AUTO: 0.5 % — SIGNIFICANT CHANGE UP (ref 0–0.9)
LYMPHOCYTES # BLD AUTO: 0.29 K/UL — LOW (ref 1–3.3)
LYMPHOCYTES # BLD AUTO: 7.6 % — LOW (ref 13–44)
MAGNESIUM SERPL-MCNC: 2.3 MG/DL — SIGNIFICANT CHANGE UP (ref 1.6–2.6)
MCHC RBC-ENTMCNC: 27.8 PG — SIGNIFICANT CHANGE UP (ref 27–34)
MCHC RBC-ENTMCNC: 32.7 GM/DL — SIGNIFICANT CHANGE UP (ref 32–36)
MCV RBC AUTO: 84.8 FL — SIGNIFICANT CHANGE UP (ref 80–100)
MONOCYTES # BLD AUTO: 0.87 K/UL — SIGNIFICANT CHANGE UP (ref 0–0.9)
MONOCYTES NFR BLD AUTO: 22.8 % — HIGH (ref 2–14)
NEUTROPHILS # BLD AUTO: 1.34 K/UL — LOW (ref 1.8–7.4)
NEUTROPHILS NFR BLD AUTO: 35.2 % — LOW (ref 43–77)
NRBC # BLD: 0 /100 WBCS — SIGNIFICANT CHANGE UP (ref 0–0)
NRBC # FLD: 0 K/UL — SIGNIFICANT CHANGE UP (ref 0–0)
PHOSPHATE SERPL-MCNC: 3.7 MG/DL — SIGNIFICANT CHANGE UP (ref 2.5–4.5)
PLATELET # BLD AUTO: 253 K/UL — SIGNIFICANT CHANGE UP (ref 150–400)
POTASSIUM SERPL-MCNC: 3.2 MMOL/L — LOW (ref 3.5–5.3)
POTASSIUM SERPL-SCNC: 3.2 MMOL/L — LOW (ref 3.5–5.3)
RBC # BLD: 3.96 M/UL — LOW (ref 4.2–5.8)
RBC # FLD: 15.2 % — HIGH (ref 10.3–14.5)
SODIUM SERPL-SCNC: 135 MMOL/L — SIGNIFICANT CHANGE UP (ref 135–145)
WBC # BLD: 3.81 K/UL — SIGNIFICANT CHANGE UP (ref 3.8–10.5)
WBC # FLD AUTO: 3.81 K/UL — SIGNIFICANT CHANGE UP (ref 3.8–10.5)

## 2022-10-30 PROCEDURE — 99233 SBSQ HOSP IP/OBS HIGH 50: CPT

## 2022-10-30 PROCEDURE — 93010 ELECTROCARDIOGRAM REPORT: CPT

## 2022-10-30 RX ORDER — MORPHINE SULFATE 50 MG/1
4 CAPSULE, EXTENDED RELEASE ORAL EVERY 4 HOURS
Refills: 0 | Status: DISCONTINUED | OUTPATIENT
Start: 2022-10-30 | End: 2022-11-01

## 2022-10-30 RX ORDER — POTASSIUM CHLORIDE 20 MEQ
40 PACKET (EA) ORAL ONCE
Refills: 0 | Status: COMPLETED | OUTPATIENT
Start: 2022-10-30 | End: 2022-10-30

## 2022-10-30 RX ORDER — AZITHROMYCIN 500 MG/1
TABLET, FILM COATED ORAL
Refills: 0 | Status: COMPLETED | OUTPATIENT
Start: 2022-10-30 | End: 2022-11-01

## 2022-10-30 RX ORDER — AZITHROMYCIN 500 MG/1
500 TABLET, FILM COATED ORAL EVERY 24 HOURS
Refills: 0 | Status: COMPLETED | OUTPATIENT
Start: 2022-10-31 | End: 2022-11-01

## 2022-10-30 RX ORDER — AZITHROMYCIN 500 MG/1
500 TABLET, FILM COATED ORAL ONCE
Refills: 0 | Status: COMPLETED | OUTPATIENT
Start: 2022-10-30 | End: 2022-10-30

## 2022-10-30 RX ADMIN — Medication 400 MILLIGRAM(S): at 16:46

## 2022-10-30 RX ADMIN — Medication 400 MILLIGRAM(S): at 06:03

## 2022-10-30 RX ADMIN — Medication 40 MILLIEQUIVALENT(S): at 09:29

## 2022-10-30 RX ADMIN — AZITHROMYCIN 255 MILLIGRAM(S): 500 TABLET, FILM COATED ORAL at 11:12

## 2022-10-30 RX ADMIN — MORPHINE SULFATE 4 MILLIGRAM(S): 50 CAPSULE, EXTENDED RELEASE ORAL at 22:32

## 2022-10-30 RX ADMIN — PIPERACILLIN AND TAZOBACTAM 25 GRAM(S): 4; .5 INJECTION, POWDER, LYOPHILIZED, FOR SOLUTION INTRAVENOUS at 03:57

## 2022-10-30 RX ADMIN — TAMSULOSIN HYDROCHLORIDE 0.4 MILLIGRAM(S): 0.4 CAPSULE ORAL at 01:06

## 2022-10-30 RX ADMIN — ATORVASTATIN CALCIUM 20 MILLIGRAM(S): 80 TABLET, FILM COATED ORAL at 22:32

## 2022-10-30 RX ADMIN — MORPHINE SULFATE 2 MILLIGRAM(S): 50 CAPSULE, EXTENDED RELEASE ORAL at 02:10

## 2022-10-30 RX ADMIN — MORPHINE SULFATE 2 MILLIGRAM(S): 50 CAPSULE, EXTENDED RELEASE ORAL at 01:13

## 2022-10-30 RX ADMIN — ENTECAVIR 0.5 MILLIGRAM(S): 0.5 TABLET ORAL at 11:13

## 2022-10-30 RX ADMIN — FAMOTIDINE 20 MILLIGRAM(S): 10 INJECTION INTRAVENOUS at 11:14

## 2022-10-30 RX ADMIN — MORPHINE SULFATE 2 MILLIGRAM(S): 50 CAPSULE, EXTENDED RELEASE ORAL at 06:33

## 2022-10-30 RX ADMIN — TAMSULOSIN HYDROCHLORIDE 0.4 MILLIGRAM(S): 0.4 CAPSULE ORAL at 22:32

## 2022-10-30 RX ADMIN — MORPHINE SULFATE 4 MILLIGRAM(S): 50 CAPSULE, EXTENDED RELEASE ORAL at 22:45

## 2022-10-30 RX ADMIN — ATORVASTATIN CALCIUM 20 MILLIGRAM(S): 80 TABLET, FILM COATED ORAL at 01:05

## 2022-10-30 RX ADMIN — ENOXAPARIN SODIUM 40 MILLIGRAM(S): 100 INJECTION SUBCUTANEOUS at 11:14

## 2022-10-30 RX ADMIN — Medication 5 MILLIGRAM(S): at 11:14

## 2022-10-30 NOTE — PROGRESS NOTE ADULT - PROBLEM SELECTOR PLAN 3
Patient with decreased appetite since starting Rituxan therapy  Encourage oral intake with fluids  Nutritional support with Ensure Plus

## 2022-10-30 NOTE — PROVIDER CONTACT NOTE (OTHER) - BACKGROUND
Patient admitted with FTT. Hx of hodgkin's lymphoma, hep B
Patient  has a history of non hodgkins lymphoma and hep B. Patient admitted for failure to thrive and recently has had bloody stools

## 2022-10-30 NOTE — PROGRESS NOTE ADULT - PROBLEM SELECTOR PLAN 2
In setting of immunosuppression from Rituximab and lymphoma  -GI stool PCR positive for campylobacter and EPEC  -Zosyn d/denilson, started on azithromycin x3 days  -monitor stool count

## 2022-10-30 NOTE — PROGRESS NOTE ADULT - PROBLEM SELECTOR PLAN 1
Febrile and tachycardic with leukocytosis  -likely 2/2 campylobacter and EPEC gastroenteritis  -f/u blood cultures- NGTD  -UA and RVP neg  -CXR clear lungs  -f/u Cdiff PCR

## 2022-10-30 NOTE — PROGRESS NOTE ADULT - ASSESSMENT
63 y/o male with PMH of Non Hodgkin's Lymphoma on Rituxan chemotherapy treatment #7 two weeks ago, HBV, TIA p/w N/V/D. Sepsis 2/2 campylobacter and EPEC gastroenteritis.

## 2022-10-30 NOTE — PROGRESS NOTE ADULT - PROBLEM SELECTOR PLAN 4
CT of abdomen showed Multifocal homogeneous soft tissue masses involving the upper para-aortic retroperitoneum and central mesentery with encasement of regional vessels. Lymphoma is a primary differential diagnostic consideration.  Non Hodgkin's Lymphoma on Rituxan chemotherapy treatment #7 two weeks ago  Started morphine 4mg IV q4h PRN for pain- patient has chronic abdominal pain due to lymphoma  Follow up with oncologist Dr Cantu as outpatient

## 2022-10-31 ENCOUNTER — TRANSCRIPTION ENCOUNTER (OUTPATIENT)
Age: 62
End: 2022-10-31

## 2022-10-31 DIAGNOSIS — R06.02 SHORTNESS OF BREATH: ICD-10-CM

## 2022-10-31 LAB
ANION GAP SERPL CALC-SCNC: 12 MMOL/L — SIGNIFICANT CHANGE UP (ref 7–14)
ANISOCYTOSIS BLD QL: SLIGHT — SIGNIFICANT CHANGE UP
BASOPHILS # BLD AUTO: 0.09 K/UL — SIGNIFICANT CHANGE UP (ref 0–0.2)
BASOPHILS NFR BLD AUTO: 1.6 % — SIGNIFICANT CHANGE UP (ref 0–2)
BUN SERPL-MCNC: 16 MG/DL — SIGNIFICANT CHANGE UP (ref 7–23)
CALCIUM SERPL-MCNC: 9 MG/DL — SIGNIFICANT CHANGE UP (ref 8.4–10.5)
CHLORIDE SERPL-SCNC: 101 MMOL/L — SIGNIFICANT CHANGE UP (ref 98–107)
CO2 SERPL-SCNC: 22 MMOL/L — SIGNIFICANT CHANGE UP (ref 22–31)
CREAT SERPL-MCNC: 0.97 MG/DL — SIGNIFICANT CHANGE UP (ref 0.5–1.3)
CULTURE RESULTS: SIGNIFICANT CHANGE UP
CULTURE RESULTS: SIGNIFICANT CHANGE UP
EGFR: 87 ML/MIN/1.73M2 — SIGNIFICANT CHANGE UP
EOSINOPHIL # BLD AUTO: 2.63 K/UL — HIGH (ref 0–0.5)
EOSINOPHIL NFR BLD AUTO: 48.8 % — HIGH (ref 0–6)
GLUCOSE SERPL-MCNC: 156 MG/DL — HIGH (ref 70–99)
HCT VFR BLD CALC: 34.2 % — LOW (ref 39–50)
HGB BLD-MCNC: 11.1 G/DL — LOW (ref 13–17)
IANC: 1.79 K/UL — LOW (ref 1.8–7.4)
LYMPHOCYTES # BLD AUTO: 0.09 K/UL — LOW (ref 1–3.3)
LYMPHOCYTES # BLD AUTO: 1.6 % — LOW (ref 13–44)
MACROCYTES BLD QL: SLIGHT — SIGNIFICANT CHANGE UP
MAGNESIUM SERPL-MCNC: 2.2 MG/DL — SIGNIFICANT CHANGE UP (ref 1.6–2.6)
MCHC RBC-ENTMCNC: 28.1 PG — SIGNIFICANT CHANGE UP (ref 27–34)
MCHC RBC-ENTMCNC: 32.5 GM/DL — SIGNIFICANT CHANGE UP (ref 32–36)
MCV RBC AUTO: 86.6 FL — SIGNIFICANT CHANGE UP (ref 80–100)
METAMYELOCYTES # FLD: 2.5 % — HIGH (ref 0–1)
MONOCYTES # BLD AUTO: 0.58 K/UL — SIGNIFICANT CHANGE UP (ref 0–0.9)
MONOCYTES NFR BLD AUTO: 10.7 % — SIGNIFICANT CHANGE UP (ref 2–14)
MYELOCYTES NFR BLD: 1.7 % — HIGH (ref 0–0)
NEUTROPHILS # BLD AUTO: 1.78 K/UL — LOW (ref 1.8–7.4)
NEUTROPHILS NFR BLD AUTO: 28.1 % — LOW (ref 43–77)
NEUTS BAND # BLD: 5 % — SIGNIFICANT CHANGE UP (ref 0–6)
PHOSPHATE SERPL-MCNC: 3.2 MG/DL — SIGNIFICANT CHANGE UP (ref 2.5–4.5)
PLAT MORPH BLD: ABNORMAL
PLATELET # BLD AUTO: 271 K/UL — SIGNIFICANT CHANGE UP (ref 150–400)
PLATELET COUNT - ESTIMATE: NORMAL — SIGNIFICANT CHANGE UP
POLYCHROMASIA BLD QL SMEAR: SLIGHT — SIGNIFICANT CHANGE UP
POTASSIUM SERPL-MCNC: 3.5 MMOL/L — SIGNIFICANT CHANGE UP (ref 3.5–5.3)
POTASSIUM SERPL-SCNC: 3.5 MMOL/L — SIGNIFICANT CHANGE UP (ref 3.5–5.3)
RBC # BLD: 3.95 M/UL — LOW (ref 4.2–5.8)
RBC # FLD: 15.4 % — HIGH (ref 10.3–14.5)
RBC BLD AUTO: ABNORMAL
SMUDGE CELLS # BLD: PRESENT — SIGNIFICANT CHANGE UP
SODIUM SERPL-SCNC: 135 MMOL/L — SIGNIFICANT CHANGE UP (ref 135–145)
SPECIMEN SOURCE: SIGNIFICANT CHANGE UP
SPECIMEN SOURCE: SIGNIFICANT CHANGE UP
WBC # BLD: 5.39 K/UL — SIGNIFICANT CHANGE UP (ref 3.8–10.5)
WBC # FLD AUTO: 5.39 K/UL — SIGNIFICANT CHANGE UP (ref 3.8–10.5)

## 2022-10-31 PROCEDURE — 99233 SBSQ HOSP IP/OBS HIGH 50: CPT

## 2022-10-31 RX ADMIN — Medication 400 MILLIGRAM(S): at 16:25

## 2022-10-31 RX ADMIN — TAMSULOSIN HYDROCHLORIDE 0.4 MILLIGRAM(S): 0.4 CAPSULE ORAL at 22:17

## 2022-10-31 RX ADMIN — ENOXAPARIN SODIUM 40 MILLIGRAM(S): 100 INJECTION SUBCUTANEOUS at 10:01

## 2022-10-31 RX ADMIN — Medication 5 MILLIGRAM(S): at 10:00

## 2022-10-31 RX ADMIN — MORPHINE SULFATE 4 MILLIGRAM(S): 50 CAPSULE, EXTENDED RELEASE ORAL at 22:20

## 2022-10-31 RX ADMIN — MORPHINE SULFATE 4 MILLIGRAM(S): 50 CAPSULE, EXTENDED RELEASE ORAL at 22:35

## 2022-10-31 RX ADMIN — Medication 400 MILLIGRAM(S): at 05:41

## 2022-10-31 RX ADMIN — ENTECAVIR 0.5 MILLIGRAM(S): 0.5 TABLET ORAL at 10:00

## 2022-10-31 RX ADMIN — AZITHROMYCIN 255 MILLIGRAM(S): 500 TABLET, FILM COATED ORAL at 10:00

## 2022-10-31 RX ADMIN — FAMOTIDINE 20 MILLIGRAM(S): 10 INJECTION INTRAVENOUS at 10:01

## 2022-10-31 RX ADMIN — ATORVASTATIN CALCIUM 20 MILLIGRAM(S): 80 TABLET, FILM COATED ORAL at 22:18

## 2022-10-31 NOTE — PROGRESS NOTE ADULT - PROBLEM SELECTOR PLAN 4
CT of abdomen showed Multifocal homogeneous soft tissue masses involving the upper para-aortic retroperitoneum and central mesentery with encasement of regional vessels. Lymphoma is a primary differential diagnostic consideration.  Non Hodgkin's Lymphoma on Rituxan chemotherapy treatment #7 two weeks ago  Started morphine 4mg IV q4h PRN for pain- patient has chronic abdominal pain due to lymphoma  ctm pain, will likely transition to PO pain regimen on discharge.   Follow up with oncologist Dr Cantu as outpatient Patient with decreased appetite since starting Rituxan therapy  Encourage oral intake with fluids  Nutritional support with Ensure Plus

## 2022-10-31 NOTE — PROGRESS NOTE ADULT - PROBLEM SELECTOR PLAN 1
Febrile and tachycardic with leukocytosis  -likely 2/2 campylobacter and EPEC gastroenteritis  -f/u blood cultures- NGTD  -UA and RVP neg  -CXR clear lungs  - C.diff negative. patient noted to be breathing heavily upon returning from bathroom.   patient reports this has been chronic and increased severity after chemo.   pulse ox has been sufficient at rest.   will check ambulatory O2.   CXR reviewed on this admission, clear lungs. no new pulmonary symptoms to suggest a superimposed infection.

## 2022-10-31 NOTE — PROGRESS NOTE ADULT - PROBLEM SELECTOR PLAN 7
DVT prophylaxis Lovenox 40 mg SC QD  Plan of care d/w patient's son on 10/30 - Na 135, improving  - will monitor and trend Na level

## 2022-10-31 NOTE — DISCHARGE NOTE PROVIDER - HOSPITAL COURSE
61 y/o M pmhx NHL on Rituxan chemotherapy treatment#7 two weeks ago, HBV, TIA p/w N/V/D. Admitted for sepsis 2/2 campylobacter and EPEC Gastritis    Sepsis   - febrile and tachycardia with leukocytosis, likely 2/2 campylobacter and EPEC gastroenteritis   - BCx NGTD   - UA/RVP/CXR neg   - C diff neg     SOB on Exertion  - noted to be SOB while returning from bathroom; pt reported this had been chronic and increased severity after chemo   - CXR clear lungs   - ambulatory O2 sats negative     Acute Gastroenteritis   - iso Rituximab and lymphoma   - GI stool PCR+ for Campylobacter and EPEC   - Zosyn d/denilson started Azithromycin x3 days   - monitor stool count     Adult failure to thrive  - Patient with decrease appetite, decrease oral intake since starting Rituxan therapy   - Encourage oral intake with fluids and advance as tolerated  - Nutritional support with supplements/ Ensure Plus   - s/p IVF hydration     Non Hodgkin's lymphoma.   - CT abdomen showed Multifocal homogeneous soft tissue masses involving the upper manan-aortic retroperitoneum and central mesentery with encasement of regional vessels. Lymphoma is a primary differential diagnostic consideration.    - Non Hodgkin's Lymphoma on Rituxan chemotherapy treatment #7 two weeks ago.  - Follow up with oncologist Dr Cantu as outpatient.  - Started morphine 4mg IV q4hr PRN for pain - patient has chronic abd pain due to lymphoma     History of hepatitis B.   - Continue with Entecavir 0.5 mg QD from home.  - LFT wnl.    Hyponatremia, improving    - will monitor and trend Na level.    Patient is medically optimized for discharge. Case discussed with 61 y/o M pmhx NHL on Rituxan chemotherapy treatment#7 two weeks ago, HBV, TIA p/w N/V/D. Admitted for sepsis 2/2 campylobacter and EPEC Gastritis    Sepsis   - febrile and tachycardia with leukocytosis, likely 2/2 campylobacter and EPEC gastroenteritis   - BCx NGTD   - UA/RVP/CXR neg   - C diff neg     SOB on Exertion  - noted to be SOB while returning from bathroom; pt reported this had been chronic and increased severity after chemo   - CXR clear lungs   - ambulatory O2 sats negative     Acute Gastroenteritis   - iso Rituximab and lymphoma   - GI stool PCR+ for Campylobacter and EPEC   - Zosyn d/denilson started Azithromycin x3 days   - monitor stool count     Adult failure to thrive  - Patient with decrease appetite, decrease oral intake since starting Rituxan therapy   - Encourage oral intake with fluids and advance as tolerated  - Nutritional support with supplements/ Ensure Plus   - s/p IVF hydration     Non Hodgkin's lymphoma.   - CT abdomen showed Multifocal homogeneous soft tissue masses involving the upper manan-aortic retroperitoneum and central mesentery with encasement of regional vessels. Lymphoma is a primary differential diagnostic consideration.    - Non Hodgkin's Lymphoma on Rituxan chemotherapy treatment #7 two weeks ago.  - Follow up with oncologist Dr Cantu as outpatient.  - Started morphine 4mg IV q4hr PRN for pain - patient has chronic abd pain due to lymphoma --------------------------------    History of hepatitis B.   - Continue with Entecavir 0.5 mg QD from home.  - LFT wnl.    Hyponatremia, improving    - will monitor and trend Na level.    Patient seen and evaluated. Reviewed discharge medications with patient and attending. All new medications requiring new prescriptions were sent to the pharmacy of patient's choice. Reviewed need for prescription for previous home medications and new prescriptions sent if requested. Medically cleared/stable for discharge as per   with appropriate follow up. Patient understands and agrees with plan of care. 61 y/o M pmhx NHL on Rituxan chemotherapy treatment#7 two weeks ago, HBV, TIA p/w N/V/D. Admitted for sepsis 2/2 campylobacter and EPEC Gastritis    Sepsis   - febrile and tachycardia with leukocytosis, likely 2/2 campylobacter and EPEC gastroenteritis   - BCx NGTD   - UA/RVP/CXR neg   - C diff neg     SOB on Exertion  - noted to be SOB while returning from bathroom; pt reported this had been chronic and increased severity after chemo   - CXR clear lungs   - ambulatory O2 sats negative     Acute Gastroenteritis   - iso Rituximab and lymphoma   - GI stool PCR+ for Campylobacter and EPEC   - Zosyn d/denilson started Azithromycin x3 days   - monitor stool count     Adult failure to thrive  - Patient with decrease appetite, decrease oral intake since starting Rituxan therapy   - Encourage oral intake with fluids and advance as tolerated  - Nutritional support with supplements/ Ensure Plus   - s/p IVF hydration     Non Hodgkin's lymphoma.   - CT abdomen showed Multifocal homogeneous soft tissue masses involving the upper manan-aortic retroperitoneum and central mesentery with encasement of regional vessels. Lymphoma is a primary differential diagnostic consideration.    - Non Hodgkin's Lymphoma on Rituxan chemotherapy treatment #7 two weeks ago.  - Follow up with oncologist Dr Cantu as outpatient.  - Started morphine 4mg IV q4hr PRN for pain - patient has chronic abd pain due to lymphoma/ Will D/C on pain medications and fu with oncology     History of hepatitis B.   - Continue with Entecavir 0.5 mg QD from home.  - LFT wnl.    Hyponatremia, improving    - will monitor and trend Na level.    Patient seen and evaluated. Reviewed discharge medications with patient and attending. All new medications requiring new prescriptions were sent to the pharmacy of patient's choice. Reviewed need for prescription for previous home medications and new prescriptions sent if requested. Medically cleared/stable for discharge as per Dr. Giles on 11/2/22 with appropriate follow up. Patient understands and agrees with plan of care. 63 y/o M pmhx NHL on Rituxan chemotherapy treatment#7 two weeks ago, HBV, TIA p/w N/V/D. Admitted for sepsis 2/2 campylobacter and EPEC Gastritis    Sepsis   - febrile and tachycardia with leukocytosis, likely 2/2 campylobacter and EPEC gastroenteritis   - BCx NGTD   - UA/RVP/CXR neg   - C diff neg   - resolved.    SOB on Exertion  - noted to be SOB while returning from bathroom; pt reported this had been chronic and increased severity after chemo, attributing to fatigue.   - CXR clear lungs   - ambulatory O2 sats wnl.   - outpatient follow up with PCP.     Acute Gastroenteritis   - iso Rituximab and lymphoma   - GI stool PCR+ for Campylobacter and EPEC   - s/p Azithromycin x3 days   - clinically improved.     Adult failure to thrive  - Patient with decrease appetite, decrease oral intake since starting Rituxan therapy   - Encourage oral intake with fluids and advance as tolerated  - Nutritional support with supplements/ Ensure Plus       Non Hodgkin's lymphoma.   - CT abdomen showed Multifocal homogeneous soft tissue masses involving the upper manan-aortic retroperitoneum and central mesentery with encasement of regional vessels. Lymphoma is a primary differential diagnostic consideration.  discussed finding patient patient's oncologist. will follow up outpatient.   - Non Hodgkin's Lymphoma on Rituxan chemotherapy treatment #7 two weeks ago.  - Follow up with oncologist Dr Cantu as outpatient.  - start on oxycodone prn for pain.      History of hepatitis B.   - Continue with Entecavir 0.5 mg QD from home.  - LFT wnl.    Hyponatremia, improving    - will monitor and trend Na level.    Patient seen and evaluated. Reviewed discharge medications with patient and attending. All new medications requiring new prescriptions were sent to the pharmacy of patient's choice. Reviewed need for prescription for previous home medications and new prescriptions sent if requested. Medically cleared/stable for discharge as per Dr. Giles on 11/2/22 with appropriate follow up. Patient understands and agrees with plan of care.

## 2022-10-31 NOTE — DISCHARGE NOTE PROVIDER - CARE PROVIDER_API CALL
Dr. Cantu (Your oncologist),   Phone: (   )    -  Fax: (   )    -  Follow Up Time: 1 week    Harish (your PCP), Alejandro  Phone: [phone]  Fax: (   )    -  Follow Up Time:

## 2022-10-31 NOTE — DISCHARGE NOTE PROVIDER - PROVIDER TOKENS
FREE:[LAST:[Dr. Cantu (Your oncologist)],PHONE:[(   )    -],FAX:[(   )    -],FOLLOWUP:[1 week]],FREE:[LAST:[Harish (your PCP)],FIRST:[Alejandro],PHONE:[phone]],FAX:[(   )    -]]

## 2022-10-31 NOTE — DISCHARGE NOTE PROVIDER - NSDCMRMEDTOKEN_GEN_ALL_CORE_FT
ACYCLOVIR 400MG TAB:   AMITRIPTYLIN 10MG TAB:   ATORVASTATIN 20MG TAB:   ENTECAVIR 0.5MG TAB:   OXYBUTYNIN 5MG ER TAB:   TAMSULOSIN 0.4MG CAP:    ACYCLOVIR 400MG TAB:   AMITRIPTYLIN 10MG TAB:   ATORVASTATIN 20MG TAB:   ENTECAVIR 0.5MG TAB:   OXYBUTYNIN 5MG ER TAB:   oxyCODONE 5 mg oral tablet: 1 tab(s) orally every 8 hours, As needed, Severe Pain (7 - 10) MDD:15mg  TAMSULOSIN 0.4MG CAP:

## 2022-10-31 NOTE — PROGRESS NOTE ADULT - PROBLEM SELECTOR PLAN 3
Patient with decreased appetite since starting Rituxan therapy  Encourage oral intake with fluids  Nutritional support with Ensure Plus In setting of immunosuppression from Rituximab and lymphoma  -GI stool PCR positive for campylobacter and EPEC  -Zosyn d/denilson, started on azithromycin x3 days  -monitor stool count

## 2022-10-31 NOTE — PROGRESS NOTE ADULT - PROBLEM SELECTOR PLAN 2
In setting of immunosuppression from Rituximab and lymphoma  -GI stool PCR positive for campylobacter and EPEC  -Zosyn d/denilson, started on azithromycin x3 days  -monitor stool count Febrile and tachycardic with leukocytosis  -likely 2/2 campylobacter and EPEC gastroenteritis  -f/u blood cultures- NGTD  -UA and RVP neg  -CXR clear lungs  - C.diff negative.

## 2022-10-31 NOTE — PROGRESS NOTE ADULT - PROBLEM SELECTOR PLAN 5
Continue with Entecavir 0.5 mg QD from home CT of abdomen showed Multifocal homogeneous soft tissue masses involving the upper para-aortic retroperitoneum and central mesentery with encasement of regional vessels. Lymphoma is a primary differential diagnostic consideration.  Non Hodgkin's Lymphoma on Rituxan chemotherapy treatment #7 two weeks ago  Started morphine 4mg IV q4h PRN for pain- patient has chronic abdominal pain due to lymphoma  ctm pain, will likely transition to PO pain regimen on discharge.   Follow up with oncologist Dr Cantu as outpatient

## 2022-10-31 NOTE — DISCHARGE NOTE PROVIDER - NSDCCPCAREPLAN_GEN_ALL_CORE_FT
PRINCIPAL DISCHARGE DIAGNOSIS  Diagnosis: Acute gastroenteritis  Assessment and Plan of Treatment: You met septic criteria (body's response to infection) with your fever, elevated heart rate, and elevated white blood cell count (indication of infection). This was likely secondary to your Campylobacter and EPEC Gastroenteritis infection (bacterial infection of your GI tract). Your blood cultures were negative. Your urinalysis, respiratory viral panel and chest-xray were negative for acute findings.  You completed a course of antibiotics while you were admitted.   Please follow up with your primary care provider 1-2 weeks upon discarhge for further management. Monitor for any signs of fever, nausea, vomiting, diarrhea, chills, etc.      SECONDARY DISCHARGE DIAGNOSES  Diagnosis: Non Hodgkin's lymphoma  Assessment and Plan of Treatment: Please follow up with your oncologist Dr. Cantu as outpatient. Continue with your chemotherapy as directed by your oncologist. Continue with your pain medication regimen as directed.    Diagnosis: Adult failure to thrive  Assessment and Plan of Treatment: You admitted to decreased appetite while starting Rituxan therapy. Continue to follow a proper diet including adequate hydration and caloric intake. Please continue with nutritional support with supplements/ Ensure Plus as recommended but the nutritionist here. Follow up with your primary medical provider and oncologist 1-2 weeks upon discharge for further management.    Diagnosis: Hyponatremia  Assessment and Plan of Treatment: Your sodium levels were low during your admission however this improved. Please follow up outpatient with your primary care provider for further monitoring of these levels.     PRINCIPAL DISCHARGE DIAGNOSIS  Diagnosis: Acute gastroenteritis  Assessment and Plan of Treatment: You met septic criteria (body's response to infection) with your fever, elevated heart rate, and elevated white blood cell count (indication of infection). This was likely secondary to your Campylobacter and EPEC Gastroenteritis infection (bacterial infection of your GI tract). Your blood cultures were negative. Your urinalysis, respiratory viral panel and chest-xray were negative for acute findings.  You completed a course of antibiotics while you were admitted.   Please follow up with your primary care provider 1-2 weeks upon discarhge for further management. Monitor for any signs of fever, nausea, vomiting, diarrhea, chills, etc.      SECONDARY DISCHARGE DIAGNOSES  Diagnosis: Non Hodgkin's lymphoma  Assessment and Plan of Treatment: Please follow up with your oncologist Dr. Cantu as outpatient. Continue with your chemotherapy as directed by your oncologist. Continue with your pain medication regimen as directed.  You are being precribed pain medication, please follow up with your oncologist for further management of you pain    Diagnosis: Hyponatremia  Assessment and Plan of Treatment: Your sodium levels were low during your admission however this improved. Please follow up outpatient with your primary care provider for further monitoring of these levels.    Diagnosis: Adult failure to thrive  Assessment and Plan of Treatment: You admitted to decreased appetite while starting Rituxan therapy. Continue to follow a proper diet including adequate hydration and caloric intake. Please continue with nutritional support with supplements/ Ensure Plus as recommended but the nutritionist here. Follow up with your primary medical provider and oncologist 1-2 weeks upon discharge for further management.

## 2022-11-01 LAB
ANION GAP SERPL CALC-SCNC: 12 MMOL/L — SIGNIFICANT CHANGE UP (ref 7–14)
BASOPHILS # BLD AUTO: 0.05 K/UL — SIGNIFICANT CHANGE UP (ref 0–0.2)
BASOPHILS NFR BLD AUTO: 0.9 % — SIGNIFICANT CHANGE UP (ref 0–2)
BUN SERPL-MCNC: 16 MG/DL — SIGNIFICANT CHANGE UP (ref 7–23)
CALCIUM SERPL-MCNC: 8.9 MG/DL — SIGNIFICANT CHANGE UP (ref 8.4–10.5)
CHLORIDE SERPL-SCNC: 103 MMOL/L — SIGNIFICANT CHANGE UP (ref 98–107)
CO2 SERPL-SCNC: 21 MMOL/L — LOW (ref 22–31)
CREAT SERPL-MCNC: 0.89 MG/DL — SIGNIFICANT CHANGE UP (ref 0.5–1.3)
EGFR: 96 ML/MIN/1.73M2 — SIGNIFICANT CHANGE UP
EOSINOPHIL # BLD AUTO: 2.67 K/UL — HIGH (ref 0–0.5)
EOSINOPHIL NFR BLD AUTO: 49.9 % — HIGH (ref 0–6)
GLUCOSE SERPL-MCNC: 164 MG/DL — HIGH (ref 70–99)
HCT VFR BLD CALC: 34.4 % — LOW (ref 39–50)
HGB BLD-MCNC: 11 G/DL — LOW (ref 13–17)
IANC: 1.66 K/UL — LOW (ref 1.8–7.4)
IMM GRANULOCYTES NFR BLD AUTO: 1.9 % — HIGH (ref 0–0.9)
LYMPHOCYTES # BLD AUTO: 0.41 K/UL — LOW (ref 1–3.3)
LYMPHOCYTES # BLD AUTO: 7.7 % — LOW (ref 13–44)
MAGNESIUM SERPL-MCNC: 2.2 MG/DL — SIGNIFICANT CHANGE UP (ref 1.6–2.6)
MCHC RBC-ENTMCNC: 27.9 PG — SIGNIFICANT CHANGE UP (ref 27–34)
MCHC RBC-ENTMCNC: 32 GM/DL — SIGNIFICANT CHANGE UP (ref 32–36)
MCV RBC AUTO: 87.3 FL — SIGNIFICANT CHANGE UP (ref 80–100)
MONOCYTES # BLD AUTO: 0.46 K/UL — SIGNIFICANT CHANGE UP (ref 0–0.9)
MONOCYTES NFR BLD AUTO: 8.6 % — SIGNIFICANT CHANGE UP (ref 2–14)
NEUTROPHILS # BLD AUTO: 1.66 K/UL — LOW (ref 1.8–7.4)
NEUTROPHILS NFR BLD AUTO: 31 % — LOW (ref 43–77)
NRBC # BLD: 0 /100 WBCS — SIGNIFICANT CHANGE UP (ref 0–0)
NRBC # FLD: 0 K/UL — SIGNIFICANT CHANGE UP (ref 0–0)
NT-PROBNP SERPL-SCNC: 44 PG/ML — SIGNIFICANT CHANGE UP
PHOSPHATE SERPL-MCNC: 3.9 MG/DL — SIGNIFICANT CHANGE UP (ref 2.5–4.5)
PLATELET # BLD AUTO: 265 K/UL — SIGNIFICANT CHANGE UP (ref 150–400)
POTASSIUM SERPL-MCNC: 4.8 MMOL/L — SIGNIFICANT CHANGE UP (ref 3.5–5.3)
POTASSIUM SERPL-SCNC: 4.8 MMOL/L — SIGNIFICANT CHANGE UP (ref 3.5–5.3)
RBC # BLD: 3.94 M/UL — LOW (ref 4.2–5.8)
RBC # FLD: 15.6 % — HIGH (ref 10.3–14.5)
SODIUM SERPL-SCNC: 136 MMOL/L — SIGNIFICANT CHANGE UP (ref 135–145)
WBC # BLD: 5.35 K/UL — SIGNIFICANT CHANGE UP (ref 3.8–10.5)
WBC # FLD AUTO: 5.35 K/UL — SIGNIFICANT CHANGE UP (ref 3.8–10.5)

## 2022-11-01 PROCEDURE — 99233 SBSQ HOSP IP/OBS HIGH 50: CPT

## 2022-11-01 RX ORDER — OXYCODONE HYDROCHLORIDE 5 MG/1
5 TABLET ORAL EVERY 8 HOURS
Refills: 0 | Status: DISCONTINUED | OUTPATIENT
Start: 2022-11-01 | End: 2022-11-02

## 2022-11-01 RX ADMIN — Medication 400 MILLIGRAM(S): at 17:21

## 2022-11-01 RX ADMIN — ATORVASTATIN CALCIUM 20 MILLIGRAM(S): 80 TABLET, FILM COATED ORAL at 22:04

## 2022-11-01 RX ADMIN — TAMSULOSIN HYDROCHLORIDE 0.4 MILLIGRAM(S): 0.4 CAPSULE ORAL at 22:03

## 2022-11-01 RX ADMIN — AZITHROMYCIN 255 MILLIGRAM(S): 500 TABLET, FILM COATED ORAL at 10:51

## 2022-11-01 RX ADMIN — OXYCODONE HYDROCHLORIDE 5 MILLIGRAM(S): 5 TABLET ORAL at 23:04

## 2022-11-01 RX ADMIN — Medication 400 MILLIGRAM(S): at 05:23

## 2022-11-01 RX ADMIN — Medication 5 MILLIGRAM(S): at 12:13

## 2022-11-01 RX ADMIN — MORPHINE SULFATE 4 MILLIGRAM(S): 50 CAPSULE, EXTENDED RELEASE ORAL at 03:20

## 2022-11-01 RX ADMIN — MORPHINE SULFATE 4 MILLIGRAM(S): 50 CAPSULE, EXTENDED RELEASE ORAL at 03:05

## 2022-11-01 RX ADMIN — OXYCODONE HYDROCHLORIDE 5 MILLIGRAM(S): 5 TABLET ORAL at 22:04

## 2022-11-01 RX ADMIN — FAMOTIDINE 20 MILLIGRAM(S): 10 INJECTION INTRAVENOUS at 10:52

## 2022-11-01 RX ADMIN — ENOXAPARIN SODIUM 40 MILLIGRAM(S): 100 INJECTION SUBCUTANEOUS at 10:51

## 2022-11-01 RX ADMIN — ENTECAVIR 0.5 MILLIGRAM(S): 0.5 TABLET ORAL at 10:52

## 2022-11-01 NOTE — PROGRESS NOTE ADULT - ASSESSMENT
61 y/o male with PMH of Non Hodgkin's Lymphoma on Rituxan chemotherapy treatment #7 two weeks ago, HBV, TIA p/w N/V/D. Sepsis 2/2 campylobacter and EPEC gastroenteritis.

## 2022-11-01 NOTE — PROGRESS NOTE ADULT - PROBLEM SELECTOR PLAN 3
In setting of immunosuppression from Rituximab and lymphoma  -GI stool PCR positive for campylobacter and EPEC  -Zosyn d/denilson, started on azithromycin x3 days 10/30 - 11/1  -monitor stool count

## 2022-11-01 NOTE — PROGRESS NOTE ADULT - PROBLEM SELECTOR PLAN 1
patient noted to be breathing heavily upon returning from bathroom.   patient reports this has been chronic and increased severity after chemo.   pulse ox has been sufficient at rest. ambulatory O2 >95%.   CXR reviewed on this admission, clear lungs. no new pulmonary symptoms to suggest a superimposed infection.  likely related to fatigue and gen weakness.   - ctm, outpatient f/u with onc.

## 2022-11-01 NOTE — PROGRESS NOTE ADULT - PROBLEM SELECTOR PLAN 2
Febrile and tachycardic with leukocytosis  -likely 2/2 campylobacter and EPEC gastroenteritis  -f/u blood cultures- NGTD  -UA and RVP neg  -CXR clear lungs  - C.diff negative.

## 2022-11-01 NOTE — PROGRESS NOTE ADULT - PROBLEM SELECTOR PLAN 5
CT of abdomen showed Multifocal homogeneous soft tissue masses involving the upper para-aortic retroperitoneum and central mesentery with encasement of regional vessels. Lymphoma is a primary differential diagnostic consideration.  Non Hodgkin's Lymphoma on Rituxan chemotherapy treatment #7 two weeks ago  Started morphine 4mg IV q4h PRN for pain- patient has chronic abdominal pain due to lymphoma  ctm pain, will likely transition to PO pain regimen on discharge.   Follow up with oncologist Dr aCntu as outpatient

## 2022-11-01 NOTE — PHYSICAL THERAPY INITIAL EVALUATION ADULT - PERTINENT HX OF CURRENT PROBLEM, REHAB EVAL
Pt is a 62 year old male presenting with nausea, vomiting, and poor PO intake associated with abdominal pain, diarrhea, and increased urinary frequency.  CT A/P showed multifocal homogeneous soft tissue masses involving the upper para-aortic retroperitoneum and central mesentery with encasement of regional vessels. Pt admitted for sepsis 2/2 campylobacter and EPEC gastroenteritis.

## 2022-11-02 ENCOUNTER — TRANSCRIPTION ENCOUNTER (OUTPATIENT)
Age: 62
End: 2022-11-02

## 2022-11-02 VITALS
TEMPERATURE: 98 F | DIASTOLIC BLOOD PRESSURE: 70 MMHG | SYSTOLIC BLOOD PRESSURE: 117 MMHG | RESPIRATION RATE: 17 BRPM | OXYGEN SATURATION: 98 % | HEART RATE: 90 BPM

## 2022-11-02 LAB
ANION GAP SERPL CALC-SCNC: 13 MMOL/L — SIGNIFICANT CHANGE UP (ref 7–14)
BUN SERPL-MCNC: 16 MG/DL — SIGNIFICANT CHANGE UP (ref 7–23)
CALCIUM SERPL-MCNC: 9.1 MG/DL — SIGNIFICANT CHANGE UP (ref 8.4–10.5)
CHLORIDE SERPL-SCNC: 105 MMOL/L — SIGNIFICANT CHANGE UP (ref 98–107)
CO2 SERPL-SCNC: 22 MMOL/L — SIGNIFICANT CHANGE UP (ref 22–31)
CREAT SERPL-MCNC: 0.93 MG/DL — SIGNIFICANT CHANGE UP (ref 0.5–1.3)
EGFR: 92 ML/MIN/1.73M2 — SIGNIFICANT CHANGE UP
GLUCOSE SERPL-MCNC: 152 MG/DL — HIGH (ref 70–99)
HCT VFR BLD CALC: 36.8 % — LOW (ref 39–50)
HGB BLD-MCNC: 11.8 G/DL — LOW (ref 13–17)
MAGNESIUM SERPL-MCNC: 2.2 MG/DL — SIGNIFICANT CHANGE UP (ref 1.6–2.6)
MCHC RBC-ENTMCNC: 28.2 PG — SIGNIFICANT CHANGE UP (ref 27–34)
MCHC RBC-ENTMCNC: 32.1 GM/DL — SIGNIFICANT CHANGE UP (ref 32–36)
MCV RBC AUTO: 88 FL — SIGNIFICANT CHANGE UP (ref 80–100)
NRBC # BLD: 0 /100 WBCS — SIGNIFICANT CHANGE UP (ref 0–0)
NRBC # FLD: 0 K/UL — SIGNIFICANT CHANGE UP (ref 0–0)
PHOSPHATE SERPL-MCNC: 3.3 MG/DL — SIGNIFICANT CHANGE UP (ref 2.5–4.5)
PLATELET # BLD AUTO: 306 K/UL — SIGNIFICANT CHANGE UP (ref 150–400)
POTASSIUM SERPL-MCNC: 3.5 MMOL/L — SIGNIFICANT CHANGE UP (ref 3.5–5.3)
POTASSIUM SERPL-SCNC: 3.5 MMOL/L — SIGNIFICANT CHANGE UP (ref 3.5–5.3)
RBC # BLD: 4.18 M/UL — LOW (ref 4.2–5.8)
RBC # FLD: 15.7 % — HIGH (ref 10.3–14.5)
SODIUM SERPL-SCNC: 140 MMOL/L — SIGNIFICANT CHANGE UP (ref 135–145)
WBC # BLD: 6.36 K/UL — SIGNIFICANT CHANGE UP (ref 3.8–10.5)
WBC # FLD AUTO: 6.36 K/UL — SIGNIFICANT CHANGE UP (ref 3.8–10.5)

## 2022-11-02 PROCEDURE — 99239 HOSP IP/OBS DSCHRG MGMT >30: CPT

## 2022-11-02 RX ORDER — POTASSIUM CHLORIDE 20 MEQ
40 PACKET (EA) ORAL ONCE
Refills: 0 | Status: COMPLETED | OUTPATIENT
Start: 2022-11-02 | End: 2022-11-02

## 2022-11-02 RX ORDER — OXYCODONE HYDROCHLORIDE 5 MG/1
1 TABLET ORAL
Qty: 15 | Refills: 0
Start: 2022-11-02 | End: 2022-11-06

## 2022-11-02 RX ADMIN — Medication 400 MILLIGRAM(S): at 06:05

## 2022-11-02 RX ADMIN — ENOXAPARIN SODIUM 40 MILLIGRAM(S): 100 INJECTION SUBCUTANEOUS at 11:25

## 2022-11-02 RX ADMIN — Medication 5 MILLIGRAM(S): at 11:24

## 2022-11-02 RX ADMIN — ENTECAVIR 0.5 MILLIGRAM(S): 0.5 TABLET ORAL at 11:25

## 2022-11-02 RX ADMIN — Medication 40 MILLIEQUIVALENT(S): at 11:23

## 2022-11-02 RX ADMIN — FAMOTIDINE 20 MILLIGRAM(S): 10 INJECTION INTRAVENOUS at 11:24

## 2022-11-02 NOTE — PROGRESS NOTE ADULT - PROBLEM SELECTOR PLAN 5
CT of abdomen showed Multifocal homogeneous soft tissue masses involving the upper para-aortic retroperitoneum and central mesentery with encasement of regional vessels. Lymphoma is a primary differential diagnostic consideration.  Non Hodgkin's Lymphoma on Rituxan chemotherapy treatment #7 two weeks ago  Started morphine 4mg IV q4h PRN for pain- patient has chronic abdominal pain due to lymphoma  ctm pain, will likely transition to PO pain regimen on discharge.   CT with para-aortic and paramesenteric soft tissue masses. discussed CT result with Dr. Mcgill. will follow up and monitor outpatient.   Follow up with oncologist Dr Alondra Mcgill as outpatient

## 2022-11-02 NOTE — DISCHARGE NOTE NURSING/CASE MANAGEMENT/SOCIAL WORK - PATIENT PORTAL LINK FT
You can access the FollowMyHealth Patient Portal offered by [facility] by registering at the following website: [email or web address]. By joining [employer]’s FollowMyHealth portal, you will also be able to view your health information using other applications (apps) compatible with our system.

## 2022-11-02 NOTE — PROGRESS NOTE ADULT - PROBLEM SELECTOR PLAN 8
DVT prophylaxis Lovenox 40 mg SC QD  Diet: Reg  Dispo: pending clinical improvement, likely 11/2. home with no PT needs.
DVT prophylaxis Lovenox 40 mg SC QD  Diet: Reg  Dispo: pending clinical improvement, likely 11/2. home with no PT needs.
DVT prophylaxis Lovenox 40 mg SC QD  Plan of care d/w patient's daughter on 10/31

## 2022-11-02 NOTE — DISCHARGE NOTE NURSING/CASE MANAGEMENT/SOCIAL WORK - NSDCPEFALRISK_GEN_ALL_CORE
For information on Fall & Injury Prevention, visit: [email or web address] OR  [email or web address] OR  https://www.cdc.gov/steadi/patient.html

## 2022-11-02 NOTE — PROGRESS NOTE ADULT - SUBJECTIVE AND OBJECTIVE BOX
[facility] Division of [facility] Medicine  James Giles MD  In House Pager [phone]    Patient is a 62y old  Male who presents with a chief complaint of Nausea and vomiting (01 Nov 2022 13:19)    SUBJECTIVE / OVERNIGHT EVENTS: no acute events. patient continues to report improvement of his diarrhea. feeling better today. only 2BM this morning.     ROS: Denied Fever, Chill, CP, SOB, Abd pain, N/V, LE swelling or pain.     MEDICATIONS  (STANDING):  acyclovir   Oral Tab/Cap 400 milliGRAM(s) Oral two times a day  amitriptyline 10 milliGRAM(s) Oral daily  atorvastatin 20 milliGRAM(s) Oral at bedtime  enoxaparin Injectable 40 milliGRAM(s) SubCutaneous every 24 hours  entecavir 0.5 milliGRAM(s) Oral daily  famotidine    Tablet 20 milliGRAM(s) Oral daily  oxybutynin XL 5 milliGRAM(s) Oral daily  tamsulosin 0.4 milliGRAM(s) Oral at bedtime    MEDICATIONS  (PRN):  acetaminophen     Tablet .. 650 milliGRAM(s) Oral every 6 hours PRN Temp greater or equal to 38C (100.4F), Mild Pain (1 - 3)  aluminum hydroxide/magnesium hydroxide/simethicone Suspension 30 milliLiter(s) Oral every 4 hours PRN Dyspepsia  ondansetron Injectable 4 milliGRAM(s) IV Push every 8 hours PRN Nausea and/or Vomiting  oxyCODONE    IR 5 milliGRAM(s) Oral every 8 hours PRN Severe Pain (7 - 10)    CAPILLARY BLOOD GLUCOSE        I&O's Summary    01 Nov 2022 07:01  -  02 Nov 2022 07:00  --------------------------------------------------------  IN: 200 mL / OUT: 0 mL / NET: 200 mL    02 Nov 2022 07:01  -  02 Nov 2022 12:53  --------------------------------------------------------  IN: 240 mL / OUT: 0 mL / NET: 240 mL      PHYSICAL EXAM:  Vital Signs Last 24 Hrs  T(C): 36.9 (02 Nov 2022 11:50), Max: 36.9 (01 Nov 2022 21:00)  T(F): 98.5 (02 Nov 2022 11:50), Max: 98.5 (02 Nov 2022 11:50)  HR: 90 (02 Nov 2022 11:50) (85 - 97)  BP: 117/70 (02 Nov 2022 11:50) (115/67 - 124/65)  BP(mean): --  RR: 17 (02 Nov 2022 11:50) (16 - 17)  SpO2: 98% (02 Nov 2022 11:50) (95% - 98%)    Parameters below as of 02 Nov 2022 11:50  Patient On (Oxygen Delivery Method): room air      Gen: NAD; sitting in bed comfortably   Pulm: no accessory muscle use; lungs clear on auscultation bilaterally; no wheezing or crackles.   Cards: RRR, nl S1/S2; no LE edema; no JVD  Abd: non-distended; soft and NT on exam; +bs  Ext: LUIS A; no joint effusion or tenderness in upper and lower extremities; no cyanosis  Neuro: Awake and Alert; non-focal; moving all extremities.   Skin: no new rashes; warm to touch;     LABS:                        11.8   6.36  )-----------( 306      ( 02 Nov 2022 06:45 )             36.8     11-02    140  |  105  |  16  ----------------------------<  152<H>  3.5   |  22  |  0.93    Ca    9.1      02 Nov 2022 06:45  Phos  3.3     11-02  Mg     2.20     11-02                RADIOLOGY & ADDITIONAL TESTS:  Results Reviewed: Y  Imaging Personally Reviewed: Y  Electrocardiogram Personally Reviewed: Y    COORDINATION OF CARE:  Care Discussed with Consultants/Other Providers [Y/N]: Y  Prior or Outpatient Records Reviewed [Y/N]: Y  
Mana Lee  [facility]  Pager #[phone]  Available on [employer] Teams    Patient is a 62y old  Male who presents with a chief complaint of Nausea and vomiting (29 Oct 2022 17:33)      SUBJECTIVE / OVERNIGHT EVENTS: Patient seen and examined at bedside. Patient's son at bedside. Patient feeling better today, diarrhea is improved. Still having abdominal pain- states it is chronic but slightly worse the past few days. Still with poor appetite, not eating much.    ADDITIONAL REVIEW OF SYSTEMS:    MEDICATIONS  (STANDING):  acyclovir   Oral Tab/Cap 400 milliGRAM(s) Oral two times a day  amitriptyline 10 milliGRAM(s) Oral daily  atorvastatin 20 milliGRAM(s) Oral at bedtime  azithromycin  IVPB      enoxaparin Injectable 40 milliGRAM(s) SubCutaneous every 24 hours  entecavir 0.5 milliGRAM(s) Oral daily  famotidine    Tablet 20 milliGRAM(s) Oral daily  oxybutynin XL 5 milliGRAM(s) Oral daily  sodium chloride 0.9%. 1000 milliLiter(s) (100 mL/Hr) IV Continuous <Continuous>  tamsulosin 0.4 milliGRAM(s) Oral at bedtime    MEDICATIONS  (PRN):  acetaminophen     Tablet .. 650 milliGRAM(s) Oral every 6 hours PRN Temp greater or equal to 38C (100.4F), Mild Pain (1 - 3)  aluminum hydroxide/magnesium hydroxide/simethicone Suspension 30 milliLiter(s) Oral every 4 hours PRN Dyspepsia  morphine  - Injectable 4 milliGRAM(s) IV Push every 4 hours PRN Moderate-severe pain  ondansetron Injectable 4 milliGRAM(s) IV Push every 8 hours PRN Nausea and/or Vomiting      CAPILLARY BLOOD GLUCOSE        I&O's Summary    29 Oct 2022 07:01  -  30 Oct 2022 07:00  --------------------------------------------------------  IN: 300 mL / OUT: 550 mL / NET: -250 mL        PHYSICAL EXAM:    Vital Signs Last 24 Hrs  T(C): 36.8 (30 Oct 2022 11:36), Max: 36.8 (30 Oct 2022 11:36)  T(F): 98.3 (30 Oct 2022 11:36), Max: 98.3 (30 Oct 2022 11:36)  HR: 99 (30 Oct 2022 11:36) (83 - 110)  BP: 112/70 (30 Oct 2022 11:36) (112/70 - 132/76)  BP(mean): 93 (29 Oct 2022 18:45) (93 - 93)  RR: 17 (30 Oct 2022 11:36) (17 - 18)  SpO2: 99% (30 Oct 2022 11:36) (96% - 100%)    Parameters below as of 30 Oct 2022 11:36  Patient On (Oxygen Delivery Method): room air    CONSTITUTIONAL: NAD, Well-developed  EYES: Conjunctiva and sclera clear  ENMT: Moist oral mucosa  RESPIRATORY: Normal respiratory effort; lungs are clear to auscultation bilaterally  CARDIOVASCULAR: Regular rate and rhythm, normal S1 and S2, no murmur/rub/gallop  ABDOMEN: Soft, tender to palpation diffusely, normoactive bowel sounds  PSYCH: AAOx3  NEUROLOGY: No focal deficits  SKIN: No rashes; no palpable lesions    LABS:                        11.0   3.81  )-----------( 253      ( 30 Oct 2022 07:26 )             33.6     10-30    135  |  103  |  14  ----------------------------<  113<H>  3.2<L>   |  21<L>  |  1.06    Ca    9.0      30 Oct 2022 07:26  Phos  3.7     10-30  Mg     2.30     10-30    TPro  7.5  /  Alb  4.1  /  TBili  0.8  /  DBili  x   /  AST  22  /  ALT  33  /  AlkPhos  211<H>  10-29      Culture - Blood (collected 29 Oct 2022 05:02)  Source: .Blood Blood-Peripheral  Preliminary Report (30 Oct 2022 09:01):    No growth to date.    Culture - Blood (collected 29 Oct 2022 04:15)  Source: .Blood Blood-Venous  Preliminary Report (30 Oct 2022 09:01):    No growth to date.    Culture - Stool (collected 28 Oct 2022 23:13)  Source: .Stool Feces  Preliminary Report (30 Oct 2022 09:52):    No enteric pathogens to date: Final culture pending      RADIOLOGY & ADDITIONAL TESTS: No new imaging  Results Reviewed: Yes  Imaging Personally Reviewed:  Electrocardiogram Personally Reviewed:    COORDINATION OF CARE:  Care Discussed with Consultants/Other Providers [Y/N]:  Prior or Outpatient Records Reviewed [Y/N]:  
[facility] Division of Hospital Medicine  James Giles MD  In House Pager [phone]    Patient is a 62y old  Male who presents with a chief complaint of Nausea and vomiting (31 Oct 2022 16:18)    SUBJECTIVE / OVERNIGHT EVENTS: no acute events. patient reports continue improvement in diarrhea. still doesn't feel comfortable going home yet as he's still going to bathroom mulitple times in the past few hours. feeling generalized fatigue.     ROS: Denied Fever, Chill, CP, SOB, Abd pain, N/V/D, LE swelling or pain.     MEDICATIONS  (STANDING):  acyclovir   Oral Tab/Cap 400 milliGRAM(s) Oral two times a day  amitriptyline 10 milliGRAM(s) Oral daily  atorvastatin 20 milliGRAM(s) Oral at bedtime  enoxaparin Injectable 40 milliGRAM(s) SubCutaneous every 24 hours  entecavir 0.5 milliGRAM(s) Oral daily  famotidine    Tablet 20 milliGRAM(s) Oral daily  oxybutynin XL 5 milliGRAM(s) Oral daily  tamsulosin 0.4 milliGRAM(s) Oral at bedtime    MEDICATIONS  (PRN):  acetaminophen     Tablet .. 650 milliGRAM(s) Oral every 6 hours PRN Temp greater or equal to 38C (100.4F), Mild Pain (1 - 3)  aluminum hydroxide/magnesium hydroxide/simethicone Suspension 30 milliLiter(s) Oral every 4 hours PRN Dyspepsia  ondansetron Injectable 4 milliGRAM(s) IV Push every 8 hours PRN Nausea and/or Vomiting  oxyCODONE    IR 5 milliGRAM(s) Oral every 8 hours PRN Severe Pain (7 - 10)    CAPILLARY BLOOD GLUCOSE        I&O's Summary    PHYSICAL EXAM:  Vital Signs Last 24 Hrs  T(C): 36.8 (01 Nov 2022 10:42), Max: 36.9 (31 Oct 2022 21:02)  T(F): 98.2 (01 Nov 2022 10:42), Max: 98.5 (31 Oct 2022 21:02)  HR: 100 (01 Nov 2022 10:42) (86 - 100)  BP: 102/58 (01 Nov 2022 10:42) (102/58 - 116/66)  BP(mean): --  RR: 16 (01 Nov 2022 10:42) (16 - 18)  SpO2: 96% (01 Nov 2022 10:42) (96% - 100%)    Parameters below as of 01 Nov 2022 10:42  Patient On (Oxygen Delivery Method): room air      Gen: NAD; sitting in bed comfortably   Pulm: no accessory muscle use; lungs clear on auscultation bilaterally; no wheezing or crackles.   Cards: RRR, nl S1/S2; no LE edema; no JVD  Abd: non-distended; soft and NT on exam; +bs  Ext: LUIS A; no joint effusion or tenderness in upper and lower extremities; no cyanosis  Neuro: Awake and Alert; non-focal; moving all extremities.   Skin: no new rashes; warm to touch;     LABS:                        11.0   5.35  )-----------( 265      ( 01 Nov 2022 04:15 )             34.4     11-01    136  |  103  |  16  ----------------------------<  164<H>  4.8   |  21<L>  |  0.89    Ca    8.9      01 Nov 2022 04:15  Phos  3.9     11-01  Mg     2.20     11-01                RADIOLOGY & ADDITIONAL TESTS:  Results Reviewed: Y  Imaging Personally Reviewed: Y  Electrocardiogram Personally Reviewed: Y    COORDINATION OF CARE:  Care Discussed with Consultants/Other Providers [Y/N]: Y  Prior or Outpatient Records Reviewed [Y/N]: JOLENE  
[facility] Division of Hospital Medicine  James Giles MD  In House Pager [phone]    Patient is a 62y old  Male who presents with a chief complaint of Nausea and vomiting (30 Oct 2022 14:21)      SUBJECTIVE / OVERNIGHT EVENTS:  NAEO, Patient seen and examined at bedside, no acute complaints today. reports vomiting had resolved, still having watery diarrhea.   No fever, no chills, no SOB, no CP, no dysuria.       MEDICATIONS  (STANDING):  acyclovir   Oral Tab/Cap 400 milliGRAM(s) Oral two times a day  amitriptyline 10 milliGRAM(s) Oral daily  atorvastatin 20 milliGRAM(s) Oral at bedtime  azithromycin  IVPB      azithromycin  IVPB 500 milliGRAM(s) IV Intermittent every 24 hours  enoxaparin Injectable 40 milliGRAM(s) SubCutaneous every 24 hours  entecavir 0.5 milliGRAM(s) Oral daily  famotidine    Tablet 20 milliGRAM(s) Oral daily  oxybutynin XL 5 milliGRAM(s) Oral daily  sodium chloride 0.9%. 1000 milliLiter(s) (100 mL/Hr) IV Continuous <Continuous>  tamsulosin 0.4 milliGRAM(s) Oral at bedtime    MEDICATIONS  (PRN):  acetaminophen     Tablet .. 650 milliGRAM(s) Oral every 6 hours PRN Temp greater or equal to 38C (100.4F), Mild Pain (1 - 3)  aluminum hydroxide/magnesium hydroxide/simethicone Suspension 30 milliLiter(s) Oral every 4 hours PRN Dyspepsia  morphine  - Injectable 4 milliGRAM(s) IV Push every 4 hours PRN Moderate-severe pain  ondansetron Injectable 4 milliGRAM(s) IV Push every 8 hours PRN Nausea and/or Vomiting    CAPILLARY BLOOD GLUCOSE        I&O's Summary    30 Oct 2022 07:01  -  31 Oct 2022 07:00  --------------------------------------------------------  IN: 240 mL / OUT: 400 mL / NET: -160 mL        PHYSICAL EXAM:  Vital Signs Last 24 Hrs  T(C): 36.8 (31 Oct 2022 11:48), Max: 36.8 (31 Oct 2022 11:48)  T(F): 98.3 (31 Oct 2022 11:48), Max: 98.3 (31 Oct 2022 11:48)  HR: 94 (31 Oct 2022 11:48) (89 - 97)  BP: 105/67 (31 Oct 2022 11:48) (102/64 - 125/70)  BP(mean): --  RR: 18 (31 Oct 2022 11:48) (17 - 18)  SpO2: 98% (31 Oct 2022 11:48) (98% - 99%)    Parameters below as of 31 Oct 2022 11:48  Patient On (Oxygen Delivery Method): room air        Gen: NAD; sitting in bed comfortably   Pulm: no accessory muscle use; lungs clear on auscultation bilaterally; no wheezing or crackles.   Cards: RRR, nl S1/S2; no LE edema; no JVD  Abd: non-distended; soft and NT on exam; +bs  Ext: LUIS A; no joint effusion or tenderness in upper and lower extremities; no cyanosis  Neuro: Awake and Alert; non-focal; moving all extremities.   Skin: no new rashes; warm to touch;     LABS:                        11.1   5.39  )-----------( 271      ( 31 Oct 2022 06:40 )             34.2     10-31    135  |  101  |  16  ----------------------------<  156<H>  3.5   |  22  |  0.97    Ca    9.0      31 Oct 2022 06:40  Phos  3.2     10-31  Mg     2.20     10-31                Culture - Blood (collected 29 Oct 2022 05:02)  Source: .Blood Blood-Peripheral  Preliminary Report (30 Oct 2022 09:01):    No growth to date.    Culture - Blood (collected 29 Oct 2022 04:15)  Source: .Blood Blood-Venous  Preliminary Report (30 Oct 2022 09:01):    No growth to date.    Culture - Stool (collected 28 Oct 2022 23:13)  Source: .Stool Feces  Final Report (31 Oct 2022 10:44):    Moderate Campylobacter jejuni    (Stool culture examined for Salmonella,    Shigella, Campylobacter, Aeromonas, Plesiomonas,    Vibrio, E.coli O157 and Yersinia)        RADIOLOGY & ADDITIONAL TESTS:  Results Reviewed: Y  Imaging Personally Reviewed: Y  Electrocardiogram Personally Reviewed: Y    COORDINATION OF CARE:  Care Discussed with Consultants/Other Providers [Y/N]: Y  Prior or Outpatient Records Reviewed [Y/N]: Y  
Mana Rodriges  [facility] of Hospital Medicine  Pager #[phone]  Available on [employer] Teams    Patient is a 62y old  Male who presents with a chief complaint of Failure to thrive in adult     (29 Oct 2022 16:17)      SUBJECTIVE / OVERNIGHT EVENTS: Patient seen and examined at bedside. Patient's son at bedside. Patient resting, when woken up he appeared agitated, gripping his abdomen in pain and grimacing. Patient's son reports pt is having watery diarrhea, 6 episodes this morning so far. No one else at home is sick. Pt walked to the bathroom this morning, felt very dizzy while standing to get up from the toilet.    ADDITIONAL REVIEW OF SYSTEMS:    MEDICATIONS  (STANDING):  acyclovir   Oral Tab/Cap 400 milliGRAM(s) Oral two times a day  amitriptyline 10 milliGRAM(s) Oral daily  atorvastatin 20 milliGRAM(s) Oral at bedtime  enoxaparin Injectable 40 milliGRAM(s) SubCutaneous every 24 hours  entecavir 0.5 milliGRAM(s) Oral daily  famotidine    Tablet 20 milliGRAM(s) Oral daily  oxybutynin XL 5 milliGRAM(s) Oral daily  piperacillin/tazobactam IVPB.- 3.375 Gram(s) IV Intermittent once  piperacillin/tazobactam IVPB.. 3.375 Gram(s) IV Intermittent every 8 hours  sodium chloride 0.9%. 1000 milliLiter(s) (100 mL/Hr) IV Continuous <Continuous>  sodium chloride 0.9%. 1000 milliLiter(s) (75 mL/Hr) IV Continuous <Continuous>  tamsulosin 0.4 milliGRAM(s) Oral at bedtime    MEDICATIONS  (PRN):  acetaminophen     Tablet .. 650 milliGRAM(s) Oral every 6 hours PRN Temp greater or equal to 38C (100.4F), Mild Pain (1 - 3)  aluminum hydroxide/magnesium hydroxide/simethicone Suspension 30 milliLiter(s) Oral every 4 hours PRN Dyspepsia  morphine  - Injectable 2 milliGRAM(s) IV Push every 4 hours PRN Severe Pain (7 - 10)  ondansetron Injectable 4 milliGRAM(s) IV Push every 8 hours PRN Nausea and/or Vomiting      CAPILLARY BLOOD GLUCOSE        I&O's Summary      PHYSICAL EXAM:    Vital Signs Last 24 Hrs  T(C): 36.6 (29 Oct 2022 09:30), Max: 38.6 (29 Oct 2022 03:41)  T(F): 97.8 (29 Oct 2022 09:30), Max: 101.5 (29 Oct 2022 03:41)  HR: 72 (29 Oct 2022 09:30) (72 - 109)  BP: 134/76 (29 Oct 2022 09:30) (117/68 - 134/76)  BP(mean): --  RR: 18 (29 Oct 2022 09:30) (18 - 19)  SpO2: 100% (29 Oct 2022 09:30) (96% - 100%)    Parameters below as of 29 Oct 2022 09:30  Patient On (Oxygen Delivery Method): room air    CONSTITUTIONAL: Well-developed, uncomfortable 2/2 pain  EYES: Conjunctiva and sclera clear  ENMT: Moist oral mucosa  RESPIRATORY: Normal respiratory effort; lungs are clear to auscultation bilaterally  CARDIOVASCULAR: Regular rate and rhythm, normal S1 and S2, no murmur/rub/gallop  ABDOMEN: Soft, tender to palpation diffusely hyperactive bowel sounds, +guarding  PSYCH: Eyes closed, nonverbal  NEUROLOGY: No focal deficits  SKIN: No rashes; no palpable lesions    LABS:                        13.0   4.38  )-----------( 300      ( 29 Oct 2022 05:02 )             41.3     10-29    134<L>  |  98  |  14  ----------------------------<  116<H>  3.4<L>   |  18<L>  |  1.19    Ca    9.6      29 Oct 2022 05:02  Phos  4.2     10-28  Mg     2.10     10-29    TPro  7.5  /  Alb  4.1  /  TBili  0.8  /  DBili  x   /  AST  22  /  ALT  33  /  AlkPhos  211<H>  10-29    Urinalysis Basic - ( 28 Oct 2022 03:54 )    Color: Yellow / Appearance: Clear / SG: >1.050 / pH: x  Gluc: x / Ketone: Negative  / Bili: Negative / Urobili: <2 mg/dL   Blood: x / Protein: 30 mg/dL / Nitrite: Negative   Leuk Esterase: Negative / RBC: 1 /HPF / WBC 1 /HPF   Sq Epi: x / Non Sq Epi: 1 /HPF / Bacteria: Negative    RADIOLOGY & ADDITIONAL TESTS: No new imaging  Results Reviewed: Yes  Imaging Personally Reviewed:  Electrocardiogram Personally Reviewed:    COORDINATION OF CARE:  Care Discussed with Consultants/Other Providers [Y/N]:  Prior or Outpatient Records Reviewed [Y/N]:

## 2022-11-03 LAB
CULTURE RESULTS: SIGNIFICANT CHANGE UP
CULTURE RESULTS: SIGNIFICANT CHANGE UP
SPECIMEN SOURCE: SIGNIFICANT CHANGE UP
SPECIMEN SOURCE: SIGNIFICANT CHANGE UP

## 2023-04-13 ENCOUNTER — INPATIENT (INPATIENT)
Facility: HOSPITAL | Age: 63
LOS: 3 days | Discharge: HOME | End: 2023-04-17
Attending: INTERNAL MEDICINE | Admitting: INTERNAL MEDICINE
Payer: MEDICARE

## 2023-04-13 VITALS
DIASTOLIC BLOOD PRESSURE: 75 MMHG | OXYGEN SATURATION: 100 % | RESPIRATION RATE: 18 BRPM | TEMPERATURE: 99 F | SYSTOLIC BLOOD PRESSURE: 133 MMHG | HEART RATE: 98 BPM

## 2023-04-13 DIAGNOSIS — Z90.49 ACQUIRED ABSENCE OF OTHER SPECIFIED PARTS OF DIGESTIVE TRACT: Chronic | ICD-10-CM

## 2023-04-13 LAB
BASE EXCESS BLDV CALC-SCNC: 2.8 MMOL/L — SIGNIFICANT CHANGE UP (ref -2–3)
BLOOD GAS VENOUS COMPREHENSIVE RESULT: SIGNIFICANT CHANGE UP
CHLORIDE BLDV-SCNC: 104 MMOL/L — SIGNIFICANT CHANGE UP (ref 96–108)
CO2 BLDV-SCNC: 31 MMOL/L — HIGH (ref 22–26)
GAS PNL BLDV: 138 MMOL/L — SIGNIFICANT CHANGE UP (ref 136–145)
GLUCOSE BLDV-MCNC: 94 MG/DL — SIGNIFICANT CHANGE UP (ref 70–99)
HCO3 BLDV-SCNC: 29 MMOL/L — SIGNIFICANT CHANGE UP (ref 22–29)
HCT VFR BLD CALC: 44.7 % — SIGNIFICANT CHANGE UP (ref 39–50)
HCT VFR BLDA CALC: 42 % — SIGNIFICANT CHANGE UP (ref 39–51)
HGB BLD CALC-MCNC: 13.9 G/DL — SIGNIFICANT CHANGE UP (ref 12.6–17.4)
HGB BLD-MCNC: 14.4 G/DL — SIGNIFICANT CHANGE UP (ref 13–17)
IANC: 3.51 K/UL — SIGNIFICANT CHANGE UP (ref 1.8–7.4)
LACTATE BLDV-MCNC: 1.3 MMOL/L — SIGNIFICANT CHANGE UP (ref 0.5–2)
MCHC RBC-ENTMCNC: 29.7 PG — SIGNIFICANT CHANGE UP (ref 27–34)
MCHC RBC-ENTMCNC: 32.2 GM/DL — SIGNIFICANT CHANGE UP (ref 32–36)
MCV RBC AUTO: 92.2 FL — SIGNIFICANT CHANGE UP (ref 80–100)
PCO2 BLDV: 52 MMHG — SIGNIFICANT CHANGE UP (ref 42–55)
PH BLDV: 7.36 — SIGNIFICANT CHANGE UP (ref 7.32–7.43)
PLATELET # BLD AUTO: 277 K/UL — SIGNIFICANT CHANGE UP (ref 150–400)
PO2 BLDV: 35 MMHG — SIGNIFICANT CHANGE UP (ref 25–45)
POTASSIUM BLDV-SCNC: 4.2 MMOL/L — SIGNIFICANT CHANGE UP (ref 3.5–5.1)
RBC # BLD: 4.85 M/UL — SIGNIFICANT CHANGE UP (ref 4.2–5.8)
RBC # FLD: 13.7 % — SIGNIFICANT CHANGE UP (ref 10.3–14.5)
SAO2 % BLDV: 50.6 % — LOW (ref 67–88)
WBC # BLD: 10.31 K/UL — SIGNIFICANT CHANGE UP (ref 3.8–10.5)
WBC # FLD AUTO: 10.31 K/UL — SIGNIFICANT CHANGE UP (ref 3.8–10.5)

## 2023-04-13 PROCEDURE — 99285 EMERGENCY DEPT VISIT HI MDM: CPT | Mod: FS

## 2023-04-13 RX ORDER — DIPHENHYDRAMINE HCL 50 MG
25 CAPSULE ORAL ONCE
Refills: 0 | Status: COMPLETED | OUTPATIENT
Start: 2023-04-13 | End: 2023-04-13

## 2023-04-13 RX ORDER — SODIUM CHLORIDE 9 MG/ML
1000 INJECTION INTRAMUSCULAR; INTRAVENOUS; SUBCUTANEOUS ONCE
Refills: 0 | Status: COMPLETED | OUTPATIENT
Start: 2023-04-13 | End: 2023-04-13

## 2023-04-13 RX ORDER — PIPERACILLIN AND TAZOBACTAM 4; .5 G/20ML; G/20ML
3.38 INJECTION, POWDER, LYOPHILIZED, FOR SOLUTION INTRAVENOUS ONCE
Refills: 0 | Status: COMPLETED | OUTPATIENT
Start: 2023-04-13 | End: 2023-04-13

## 2023-04-13 RX ORDER — MORPHINE SULFATE 50 MG/1
4 CAPSULE, EXTENDED RELEASE ORAL ONCE
Refills: 0 | Status: DISCONTINUED | OUTPATIENT
Start: 2023-04-13 | End: 2023-04-13

## 2023-04-13 RX ADMIN — SODIUM CHLORIDE 1000 MILLILITER(S): 9 INJECTION INTRAMUSCULAR; INTRAVENOUS; SUBCUTANEOUS at 23:21

## 2023-04-13 RX ADMIN — Medication 25 MILLIGRAM(S): at 23:34

## 2023-04-13 RX ADMIN — PIPERACILLIN AND TAZOBACTAM 200 GRAM(S): 4; .5 INJECTION, POWDER, LYOPHILIZED, FOR SOLUTION INTRAVENOUS at 23:54

## 2023-04-13 RX ADMIN — MORPHINE SULFATE 4 MILLIGRAM(S): 50 CAPSULE, EXTENDED RELEASE ORAL at 23:21

## 2023-04-13 NOTE — ED ADULT NURSE NOTE - CHIEF COMPLAINT QUOTE
c/o lower abdominal pain with nausea and diarrhea x 3 days. Also c/o dysuria. Was at [place] however left before CT was performed, has elevated band. Hx of lymphoma last chemo february, Hep b

## 2023-04-13 NOTE — ED PROVIDER NOTE - OBJECTIVE STATEMENT
64 yo M with PMH of Non-Hodgkin's lymphoma, Hep B, accompanied by son, presents to ED c/o abdominal pain a/w diarrhea x3 days. Reports having diarrhea 9-10 episodes daily. Admits he had 2 antibiotic 2 weeks ago for cough. States he was at [facility], waited 9 hrs just for CXR, no IV line was placed, decided to leave for another hospital Admits he has bands from lab. Admits dysuria. Denies any fever, chills, n/v, body aches, back pain, or any other complaints.

## 2023-04-13 NOTE — ED ADULT TRIAGE NOTE - CHIEF COMPLAINT QUOTE
c/o lower abdominal pain with nausea and diarrhea x 3 days. Also c/o dysuria. Was at [place] however left before CT was performed, has elevated band. Hx of lymphoma last chemo february, Hep b

## 2023-04-13 NOTE — ED PROVIDER NOTE - ATTENDING APP SHARED VISIT CONTRIBUTION OF CARE
Attending note:   After face to face evaluation of this patient, I concur with above noted hx, pe, and care plan for this patient.  Cam: 63-year-old male with non-Hodgkin's lymphoma and hepatitis B patient last had chemo 2 months ago.  Patient presents the ED with 3 days of worsening right-sided abdominal pain radiating to rest of abdomen but not back.  Patient also having 10 episodes of diarrhea daily there is no bleeding noted in the diarrhea.  Patient also states he has loss of appetite but no nausea or vomiting.  Patient was seen at [facility] and only had chest x-ray and blood work.  Patient also having dysuria.  On exam patient appears to be in no significant distress.  Lungs are clear and heart is regular rate and rhythm.  Abdomen is soft with tenderness throughout the right side.  There is no CVA tenderness noted bilaterally.  There is no pitting edema.  Of note patient was given morphine in left forearm IV with erythema and itching and urticaria noted over the veins of the proximal arm upstream from IV.  This likely represents an allergic reaction to the morphine and Benadryl was given.  Since morphine was already infused and is no longer active will add to patient's chart as an allergy.  Patient get CT, labs, IV fluids and antibiotics.  Blood work will reviewed from earlier hospital visit today which showed 9% bands.  Patient will likely require admission.

## 2023-04-13 NOTE — ED PROVIDER NOTE - PROGRESS NOTE DETAILS
PA ALEXA: pt given morphine, noted itchiness and inflamed veins in anterior forearm, Benadryl ordered, attending aware.

## 2023-04-13 NOTE — ED ADULT NURSE NOTE - OBJECTIVE STATEMENT
Pt A&Ox___ ambulatory at baseline___, PMH, presenting to the ED (RM__) c/o diarrhea and abdominal pain. Pt states has been experieincing generalized abdominal pain associated with diarrhea and decreased PO intake. Pt endorses generalized abdominal pain; however, states severe pain to RUQ. Pt also states several episodes of diarrhea. Pt endorses weakness and back pain. Denies any other complaints, respirations are even and unlabored, NAD, tenderness noted to abdomen area, nondistended, 20G IV L forearm, labs sent, RVP sent, medicated as per orders, Safety precautions implemented as per protocol, awaiting further MD orders, will continue with plan of care.     Reassessment: Pt complaining of itchiness to IV site and upper arm. Erythema and hives noted to L Upper arm above IV site, MD Cam and LONNY Garza made aware, medicated pt as per orders, respirations are even and unlabored, NAD, no sob, cp, palpitations, dizziness, lightheadedness, n/v, drooling noted, Safety precautions implemented as per protocol, awaiting further MD orders, will continue with plan of care.

## 2023-04-13 NOTE — ED PROVIDER NOTE - CLINICAL SUMMARY MEDICAL DECISION MAKING FREE TEXT BOX
62 yo M with PMH of Non-Hodgkin's lymphoma, Hep B, accompanied by son, presents to ED c/o abdominal pain a/w diarrhea x3 days. chronic ill appearing. Temp 99.3F. +Bands at outside hospital. concern for colitis, ddx gastroenteritis, c-diff colitis. Plan: labs, blood culture, urine, CT A/P to r/o acute intra abd pathology, IV antibiotic coverage, pain control, IVF for hydration and admission.

## 2023-04-14 DIAGNOSIS — B19.10 UNSPECIFIED VIRAL HEPATITIS B WITHOUT HEPATIC COMA: ICD-10-CM

## 2023-04-14 DIAGNOSIS — C85.90 NON-HODGKIN LYMPHOMA, UNSPECIFIED, UNSPECIFIED SITE: ICD-10-CM

## 2023-04-14 DIAGNOSIS — Z29.9 ENCOUNTER FOR PROPHYLACTIC MEASURES, UNSPECIFIED: ICD-10-CM

## 2023-04-14 DIAGNOSIS — D72.825 BANDEMIA: ICD-10-CM

## 2023-04-14 DIAGNOSIS — K52.9 NONINFECTIVE GASTROENTERITIS AND COLITIS, UNSPECIFIED: ICD-10-CM

## 2023-04-14 DIAGNOSIS — N40.1 BENIGN PROSTATIC HYPERPLASIA WITH LOWER URINARY TRACT SYMPTOMS: ICD-10-CM

## 2023-04-14 DIAGNOSIS — E78.5 HYPERLIPIDEMIA, UNSPECIFIED: ICD-10-CM

## 2023-04-14 LAB
ALBUMIN SERPL ELPH-MCNC: 4 G/DL — SIGNIFICANT CHANGE UP (ref 3.3–5)
ALP SERPL-CCNC: 119 U/L — SIGNIFICANT CHANGE UP (ref 40–120)
ALT FLD-CCNC: 14 U/L — SIGNIFICANT CHANGE UP (ref 4–41)
ANION GAP SERPL CALC-SCNC: 16 MMOL/L — HIGH (ref 7–14)
ANISOCYTOSIS BLD QL: SLIGHT — SIGNIFICANT CHANGE UP
APPEARANCE UR: CLEAR — SIGNIFICANT CHANGE UP
AST SERPL-CCNC: 28 U/L — SIGNIFICANT CHANGE UP (ref 4–40)
BASOPHILS # BLD AUTO: 0.27 K/UL — HIGH (ref 0–0.2)
BASOPHILS NFR BLD AUTO: 2.6 % — HIGH (ref 0–2)
BILIRUB SERPL-MCNC: 0.6 MG/DL — SIGNIFICANT CHANGE UP (ref 0.2–1.2)
BILIRUB UR-MCNC: NEGATIVE — SIGNIFICANT CHANGE UP
BUN SERPL-MCNC: 12 MG/DL — SIGNIFICANT CHANGE UP (ref 7–23)
CALCIUM SERPL-MCNC: 9.4 MG/DL — SIGNIFICANT CHANGE UP (ref 8.4–10.5)
CHLORIDE SERPL-SCNC: 103 MMOL/L — SIGNIFICANT CHANGE UP (ref 98–107)
CO2 SERPL-SCNC: 20 MMOL/L — LOW (ref 22–31)
COLOR SPEC: SIGNIFICANT CHANGE UP
CREAT SERPL-MCNC: 1.04 MG/DL — SIGNIFICANT CHANGE UP (ref 0.5–1.3)
DIFF PNL FLD: NEGATIVE — SIGNIFICANT CHANGE UP
EGFR: 80 ML/MIN/1.73M2 — SIGNIFICANT CHANGE UP
EOSINOPHIL # BLD AUTO: 5.16 K/UL — HIGH (ref 0–0.5)
EOSINOPHIL NFR BLD AUTO: 50 % — HIGH (ref 0–6)
FLUAV AG NPH QL: SIGNIFICANT CHANGE UP
FLUBV AG NPH QL: SIGNIFICANT CHANGE UP
GLUCOSE SERPL-MCNC: 96 MG/DL — SIGNIFICANT CHANGE UP (ref 70–99)
GLUCOSE UR QL: NEGATIVE — SIGNIFICANT CHANGE UP
KETONES UR-MCNC: NEGATIVE — SIGNIFICANT CHANGE UP
LEUKOCYTE ESTERASE UR-ACNC: NEGATIVE — SIGNIFICANT CHANGE UP
LIDOCAIN IGE QN: 21 U/L — SIGNIFICANT CHANGE UP (ref 7–60)
LYMPHOCYTES # BLD AUTO: 0.64 K/UL — LOW (ref 1–3.3)
LYMPHOCYTES # BLD AUTO: 6.2 % — LOW (ref 13–44)
MACROCYTES BLD QL: SLIGHT — SIGNIFICANT CHANGE UP
MANUAL SMEAR VERIFICATION: SIGNIFICANT CHANGE UP
MONOCYTES # BLD AUTO: 0.45 K/UL — SIGNIFICANT CHANGE UP (ref 0–0.9)
MONOCYTES NFR BLD AUTO: 4.4 % — SIGNIFICANT CHANGE UP (ref 2–14)
NEUTROPHILS # BLD AUTO: 3.53 K/UL — SIGNIFICANT CHANGE UP (ref 1.8–7.4)
NEUTROPHILS NFR BLD AUTO: 27.2 % — LOW (ref 43–77)
NEUTS BAND # BLD: 7 % — HIGH (ref 0–6)
NITRITE UR-MCNC: NEGATIVE — SIGNIFICANT CHANGE UP
PH UR: 6 — SIGNIFICANT CHANGE UP (ref 5–8)
PLAT MORPH BLD: NORMAL — SIGNIFICANT CHANGE UP
PLATELET COUNT - ESTIMATE: NORMAL — SIGNIFICANT CHANGE UP
POLYCHROMASIA BLD QL SMEAR: SLIGHT — SIGNIFICANT CHANGE UP
POTASSIUM SERPL-MCNC: 4.5 MMOL/L — SIGNIFICANT CHANGE UP (ref 3.5–5.3)
POTASSIUM SERPL-SCNC: 4.5 MMOL/L — SIGNIFICANT CHANGE UP (ref 3.5–5.3)
PROT SERPL-MCNC: 7 G/DL — SIGNIFICANT CHANGE UP (ref 6–8.3)
PROT UR-MCNC: ABNORMAL
RBC BLD AUTO: ABNORMAL
RSV RNA NPH QL NAA+NON-PROBE: SIGNIFICANT CHANGE UP
SARS-COV-2 RNA SPEC QL NAA+PROBE: SIGNIFICANT CHANGE UP
SMUDGE CELLS # BLD: PRESENT — SIGNIFICANT CHANGE UP
SODIUM SERPL-SCNC: 139 MMOL/L — SIGNIFICANT CHANGE UP (ref 135–145)
SP GR SPEC: 1.02 — SIGNIFICANT CHANGE UP (ref 1.01–1.05)
UROBILINOGEN FLD QL: SIGNIFICANT CHANGE UP
VARIANT LYMPHS # BLD: 2.6 % — SIGNIFICANT CHANGE UP (ref 0–6)

## 2023-04-14 PROCEDURE — 74177 CT ABD & PELVIS W/CONTRAST: CPT | Mod: 26,MA

## 2023-04-14 PROCEDURE — 99223 1ST HOSP IP/OBS HIGH 75: CPT

## 2023-04-14 PROCEDURE — 99223 1ST HOSP IP/OBS HIGH 75: CPT | Mod: GC

## 2023-04-14 RX ORDER — PIPERACILLIN AND TAZOBACTAM 4; .5 G/20ML; G/20ML
3.38 INJECTION, POWDER, LYOPHILIZED, FOR SOLUTION INTRAVENOUS EVERY 8 HOURS
Refills: 0 | Status: DISCONTINUED | OUTPATIENT
Start: 2023-04-14 | End: 2023-04-14

## 2023-04-14 RX ORDER — ASPIRIN/CALCIUM CARB/MAGNESIUM 324 MG
81 TABLET ORAL DAILY
Refills: 0 | Status: ACTIVE | OUTPATIENT
Start: 2023-04-14 | End: 2024-03-12

## 2023-04-14 RX ORDER — ENTECAVIR 0.5 MG/1
0.5 TABLET ORAL DAILY
Refills: 0 | Status: ACTIVE | OUTPATIENT
Start: 2023-04-14 | End: 2024-03-12

## 2023-04-14 RX ORDER — OXYBUTYNIN CHLORIDE 5 MG
0 TABLET ORAL
Qty: 0 | Refills: 0 | DISCHARGE

## 2023-04-14 RX ORDER — ENTECAVIR 0.5 MG/1
0 TABLET ORAL
Qty: 0 | Refills: 0 | DISCHARGE

## 2023-04-14 RX ORDER — ACYCLOVIR SODIUM 500 MG
400 VIAL (EA) INTRAVENOUS
Refills: 0 | Status: ACTIVE | OUTPATIENT
Start: 2023-04-14 | End: 2024-03-12

## 2023-04-14 RX ORDER — SODIUM CHLORIDE 9 MG/ML
1000 INJECTION, SOLUTION INTRAVENOUS
Refills: 0 | Status: DISCONTINUED | OUTPATIENT
Start: 2023-04-14 | End: 2023-04-16

## 2023-04-14 RX ORDER — TAMSULOSIN HYDROCHLORIDE 0.4 MG/1
0 CAPSULE ORAL
Qty: 0 | Refills: 0 | DISCHARGE

## 2023-04-14 RX ORDER — ATORVASTATIN CALCIUM 80 MG/1
0 TABLET, FILM COATED ORAL
Qty: 0 | Refills: 0 | DISCHARGE

## 2023-04-14 RX ORDER — AMITRIPTYLINE HCL 25 MG
0 TABLET ORAL
Qty: 0 | Refills: 0 | DISCHARGE

## 2023-04-14 RX ORDER — ATORVASTATIN CALCIUM 80 MG/1
20 TABLET, FILM COATED ORAL AT BEDTIME
Refills: 0 | Status: ACTIVE | OUTPATIENT
Start: 2023-04-14 | End: 2024-03-12

## 2023-04-14 RX ORDER — CHLORHEXIDINE GLUCONATE 213 G/1000ML
1 SOLUTION TOPICAL
Refills: 0 | Status: ACTIVE | OUTPATIENT
Start: 2023-04-14 | End: 2024-03-12

## 2023-04-14 RX ORDER — METRONIDAZOLE 500 MG
500 TABLET ORAL EVERY 8 HOURS
Refills: 0 | Status: DISCONTINUED | OUTPATIENT
Start: 2023-04-14 | End: 2023-04-14

## 2023-04-14 RX ORDER — PIPERACILLIN AND TAZOBACTAM 4; .5 G/20ML; G/20ML
3.38 INJECTION, POWDER, LYOPHILIZED, FOR SOLUTION INTRAVENOUS EVERY 8 HOURS
Refills: 0 | Status: ACTIVE | OUTPATIENT
Start: 2023-04-14 | End: 2023-04-21

## 2023-04-14 RX ORDER — CIPROFLOXACIN LACTATE 400MG/40ML
400 VIAL (ML) INTRAVENOUS EVERY 12 HOURS
Refills: 0 | Status: DISCONTINUED | OUTPATIENT
Start: 2023-04-14 | End: 2023-04-14

## 2023-04-14 RX ORDER — TAMSULOSIN HYDROCHLORIDE 0.4 MG/1
0.4 CAPSULE ORAL AT BEDTIME
Refills: 0 | Status: ACTIVE | OUTPATIENT
Start: 2023-04-14 | End: 2024-03-12

## 2023-04-14 RX ORDER — ACYCLOVIR SODIUM 500 MG
0 VIAL (EA) INTRAVENOUS
Qty: 0 | Refills: 0 | DISCHARGE

## 2023-04-14 RX ORDER — ENOXAPARIN SODIUM 100 MG/ML
40 INJECTION SUBCUTANEOUS EVERY 24 HOURS
Refills: 0 | Status: ACTIVE | OUTPATIENT
Start: 2023-04-14 | End: 2024-03-12

## 2023-04-14 RX ORDER — OXYBUTYNIN CHLORIDE 5 MG
5 TABLET ORAL DAILY
Refills: 0 | Status: ACTIVE | OUTPATIENT
Start: 2023-04-14 | End: 2024-03-12

## 2023-04-14 RX ADMIN — PIPERACILLIN AND TAZOBACTAM 25 GRAM(S): 4; .5 INJECTION, POWDER, LYOPHILIZED, FOR SOLUTION INTRAVENOUS at 23:37

## 2023-04-14 RX ADMIN — Medication 200 MILLIGRAM(S): at 13:51

## 2023-04-14 RX ADMIN — PIPERACILLIN AND TAZOBACTAM 25 GRAM(S): 4; .5 INJECTION, POWDER, LYOPHILIZED, FOR SOLUTION INTRAVENOUS at 18:18

## 2023-04-14 RX ADMIN — Medication 400 MILLIGRAM(S): at 18:18

## 2023-04-14 RX ADMIN — TAMSULOSIN HYDROCHLORIDE 0.4 MILLIGRAM(S): 0.4 CAPSULE ORAL at 23:34

## 2023-04-14 RX ADMIN — SODIUM CHLORIDE 75 MILLILITER(S): 9 INJECTION, SOLUTION INTRAVENOUS at 14:15

## 2023-04-14 RX ADMIN — ATORVASTATIN CALCIUM 20 MILLIGRAM(S): 80 TABLET, FILM COATED ORAL at 23:34

## 2023-04-14 RX ADMIN — ENTECAVIR 0.5 MILLIGRAM(S): 0.5 TABLET ORAL at 19:30

## 2023-04-14 NOTE — CONSULT NOTE ADULT - SUBJECTIVE AND OBJECTIVE BOX
Patient is a 62 yo M who presents with a chief complaint of abd pain    HPI:      REVIEW OF SYSTEMS      prior hospital charts reviewed [V]  primary team notes reviewed [V]  other consultant notes reviewed [V]    PAST MEDICAL & SURGICAL HISTORY:  Non Hodgkin's lymphoma    Hepatitis B    History of cholecystectomy    SOCIAL HISTORY:  - Denied smoking/vaping/alcohol/recreational drug use    FAMILY HISTORY:  FH: lymphoma (Mother)    Allergies  morphine (Rash)    ANTIMICROBIALS:    ANTIMICROBIALS (past 90 days):  MEDICATIONS  (STANDING):  piperacillin/tazobactam IVPB...   200 mL/Hr IV Intermittent (23 @ 23:54)    OTHER MEDS:   MEDICATIONS  (STANDING):    VITALS:  Vital Signs Last 24 Hrs  T(F): 98.2 (23 @ 04:10), Max: 99.3 (23 @ 20:58)    Vital Signs Last 24 Hrs  HR: 77 (23 @ 04:10) (77 - 98)  BP: 123/76 (23 @ 04:10) (123/67 - 133/75)  RR: 16 (23 @ 04:10)  SpO2: 100% (23 @ 04:10) (100% - 100%)  Wt(kg): --    EXAM:      Labs:                        14.4   10.31 )-----------( 277      ( 2023 23:11 )             44.7         139  |  103  |  12  ----------------------------<  96  4.5   |  20<L>  |  1.04    Ca    9.4      2023 23:11    TPro  7.0  /  Alb  4.0  /  TBili  0.6  /  DBili  x   /  AST  28  /  ALT  14  /  AlkPhos  119      WBC Trend:  WBC Count: 10.31 (23 @ 23:11)    Auto Neutrophil #: 3.53 K/uL (23 @ 23:11)  Band Neutrophils %: 7.0 % (23 @ 23:11)  Auto Neutrophil #: 1.66 K/uL (22 @ 04:15)  Auto Neutrophil #: 1.78 K/uL (10-31-22 @ 06:40)  Band Neutrophils %: 5.0 % (10-31-22 @ 06:40)    Creatine Trend:  Creatinine, Serum: 1.04 ()    Liver Biochemical Testing Trend:  Alanine Aminotransferase (ALT/SGPT): 14 ()  Alanine Aminotransferase (ALT/SGPT): 33 (10-29)  Alanine Aminotransferase (ALT/SGPT): 24 (10-28)  Aspartate Aminotransferase (AST/SGOT): 28 (23 @ 23:11)  Aspartate Aminotransferase (AST/SGOT): 22 (10-29-22 @ 05:02)  Aspartate Aminotransferase (AST/SGOT): 18 (10-28-22 @ 00:55)  Bilirubin Total, Serum: 0.6 ()  Bilirubin Total, Serum: 0.8 (10-29)  Bilirubin Total, Serum: 0.5 (10-28)    Auto Eosinophil %: 50.0 % (23 @ 23:11)    Urinalysis Basic - ( 2023 04:18 )    Color: Light Yellow / Appearance: Clear / S.016 / pH: x  Gluc: x / Ketone: Negative  / Bili: Negative / Urobili: <2 mg/dL   Blood: x / Protein: Trace / Nitrite: Negative   Leuk Esterase: Negative / RBC: x / WBC x   Sq Epi: x / Non Sq Epi: x / Bacteria: x    MICROBIOLOGY:    Culture - Blood (collected 29 Oct 2022 05:02)  Source: .Blood Blood-Peripheral  Final Report:    No Growth Final    Culture - Blood (collected 29 Oct 2022 04:15)  Source: .Blood Blood-Venous  Final Report:    No Growth Final    Culture - Stool (collected 28 Oct 2022 23:13)  Source: .Stool Feces  Final Report:    Moderate Campylobacter jejuni    (Stool culture examined for Salmonella,    Shigella, Campylobacter, Aeromonas, Plesiomonas,    Vibrio, E.coli O157 and Yersinia)    Blood Gas Venous - Lactate: 1.3 ( @ 23:43)    A1C with Estimated Average Glucose Result: 5.9 % (10-29-22 @ 05:02)    RADIOLOGY:  imaging below personally reviewed    < from: CT Abdomen and Pelvis w/ IV Cont (23 @ 04:46) >  IMPRESSION:    Confluent retroperitonealand mesenteric soft tissue likely neoplastic   lymphadenopathy. Not significantly changed from the prior study of   10/28/2022.    The cecum and ascending colon are diffusely thickened which is new since   the previous exam. The terminal ileum is slightly thickened as well.   There is no proximal obstruction. This may represent colitis versus   typhlitis versus lymphoma.    --- End of Report ---    < end of copied text > Patient is a 62 yo M who presents with a chief complaint of abd pain    HPI:  62 yo M with a PMH of NHL on Rituxan (last dose 2023), Hep B (Entecavir), BPH, recent admission in Oct 2022 for Campylobacter and EPEC gastroenteritis s/p treatment with Azithromycin, who presented to the ED with abd pain and diarrhea for the last 3 days.     Pt with abd pain and diarrhea (10-12 BM) for the last 3 days. Appears today his diarrhea has resolved (last BM 4.30 AM). He recently completed a course of 2 different antibiotics for URI ~2 weeks ago (one of them was Azithromycin). Denies fevers, chills, night sweats, cough.    In ED hemodynamically stable, no fevers. CBC with no leukocytosis, 7% bandemia. Covid-19/RSV/Influenza PCR negative. CT A/P with evidence of known LAD, and also with new colitis and typhlitis. Overnight appears pt IV infiltrated while receiving Zosyn and Morphine. No diffuse rash, was only localized to LUE IV.     REVIEW OF SYSTEMS  Constitutional: No fevers, chills  Skin: Phlebitis   Eyes: No discharge	  ENMT: No sore throat  Respiratory: No cough, no SOB  Cardiovascular: No chest pain  Gastrointestinal: + Abd pain  Genitourinary: No flank pain  Neurological: No HA, no AMS     prior hospital charts reviewed [V]  primary team notes reviewed [V]  other consultant notes reviewed [V]    PAST MEDICAL & SURGICAL HISTORY:  Non Hodgkin's lymphoma    Hepatitis B    History of cholecystectomy    SOCIAL HISTORY:  - Denied smoking/vaping/alcohol/recreational drug use    FAMILY HISTORY:  FH: lymphoma (Mother)    Allergies  morphine (Rash)    ANTIMICROBIALS:    ANTIMICROBIALS (past 90 days):  MEDICATIONS  (STANDING):  piperacillin/tazobactam IVPB...   200 mL/Hr IV Intermittent (23 @ 23:54)    OTHER MEDS:   MEDICATIONS  (STANDING):    VITALS:  Vital Signs Last 24 Hrs  T(F): 98.2 (23 @ 04:10), Max: 99.3 (23 @ 20:58)    Vital Signs Last 24 Hrs  HR: 77 (23 @ 04:10) (77 - 98)  BP: 123/76 (23 @ 04:10) (123/67 - 133/75)  RR: 16 (23 @ 04:10)  SpO2: 100% (23 @ 04:10) (100% - 100%)  Wt(kg): --    EXAM:  General: Patient in NAD  HEENT: NCAT, EOMI  CV: S1+S2  Lungs: No respiratory distress, CTAB  Abd: Soft, no guarding, mild TTP  : No suprapubic tenderness  Neuro: Alert and oriented, calm   Ext: No cyanosis, no edema  Skin: No rash  + LUE IV phlebitis     Labs:                        14.4   10.31 )-----------( 277      ( 2023 23:11 )             44.7         139  |  103  |  12  ----------------------------<  96  4.5   |  20<L>  |  1.04    Ca    9.4      2023 23:11    TPro  7.0  /  Alb  4.0  /  TBili  0.6  /  DBili  x   /  AST  28  /  ALT  14  /  AlkPhos  119      WBC Trend:  WBC Count: 10.31 (23 @ 23:11)    Auto Neutrophil #: 3.53 K/uL (23 @ 23:11)  Band Neutrophils %: 7.0 % (23 @ 23:11)  Auto Neutrophil #: 1.66 K/uL (22 @ 04:15)  Auto Neutrophil #: 1.78 K/uL (10-31-22 @ 06:40)  Band Neutrophils %: 5.0 % (10-31-22 @ 06:40)    Creatine Trend:  Creatinine, Serum: 1.04 ()    Liver Biochemical Testing Trend:  Alanine Aminotransferase (ALT/SGPT): 14 ()  Alanine Aminotransferase (ALT/SGPT): 33 (10-29)  Alanine Aminotransferase (ALT/SGPT): 24 (10-28)  Aspartate Aminotransferase (AST/SGOT): 28 (23 @ 23:11)  Aspartate Aminotransferase (AST/SGOT): 22 (10-29-22 @ 05:02)  Aspartate Aminotransferase (AST/SGOT): 18 (10-28-22 @ 00:55)  Bilirubin Total, Serum: 0.6 ()  Bilirubin Total, Serum: 0.8 (10-)  Bilirubin Total, Serum: 0.5 (10-)    Auto Eosinophil %: 50.0 % (23 @ 23:11)    Urinalysis Basic - ( 2023 04:18 )    Color: Light Yellow / Appearance: Clear / S.016 / pH: x  Gluc: x / Ketone: Negative  / Bili: Negative / Urobili: <2 mg/dL   Blood: x / Protein: Trace / Nitrite: Negative   Leuk Esterase: Negative / RBC: x / WBC x   Sq Epi: x / Non Sq Epi: x / Bacteria: x    MICROBIOLOGY:    Culture - Blood (collected 29 Oct 2022 05:02)  Source: .Blood Blood-Peripheral  Final Report:    No Growth Final    Culture - Blood (collected 29 Oct 2022 04:15)  Source: .Blood Blood-Venous  Final Report:    No Growth Final    Culture - Stool (collected 28 Oct 2022 23:13)  Source: .Stool Feces  Final Report:    Moderate Campylobacter jejuni    (Stool culture examined for Salmonella,    Shigella, Campylobacter, Aeromonas, Plesiomonas,    Vibrio, E.coli O157 and Yersinia)    Blood Gas Venous - Lactate: 1.3 ( @ 23:43)    A1C with Estimated Average Glucose Result: 5.9 % (10-29-22 @ 05:02)    RADIOLOGY:  imaging below personally reviewed    < from: CT Abdomen and Pelvis w/ IV Cont (23 @ 04:46) >  IMPRESSION:    Confluent retroperitonealand mesenteric soft tissue likely neoplastic   lymphadenopathy. Not significantly changed from the prior study of   10/28/2022.    The cecum and ascending colon are diffusely thickened which is new since   the previous exam. The terminal ileum is slightly thickened as well.   There is no proximal obstruction. This may represent colitis versus   typhlitis versus lymphoma.    --- End of Report ---    < end of copied text >

## 2023-04-14 NOTE — H&P ADULT - NSHPREVIEWOFSYSTEMS_GEN_ALL_CORE
Review of Systems:   CONSTITUTIONAL: No fever, no fatigue  EYES: No eye pain or discharge  ENMT:  No sinus or throat pain  NECK: No pain or stiffness  RESPIRATORY: No cough, wheezing, chills or hemoptysis; No shortness of breath  CARDIOVASCULAR: No chest pain, palpitations, dizziness, or leg swelling  GASTROINTESTINAL: +diarrhea, no vomiting   GENITOURINARY: No dysuria or incontinence  MUSCULOSKELETAL: No joint pain or swelling; No muscle, back, or extremity pain  NEUROLOGICAL: No headaches, memory loss, loss of strength, numbness, or tremors  PSYCHIATRIC: No depression, anxiety, mood swings, or difficulty sleeping  SKIN: No rashes, no skin changes

## 2023-04-14 NOTE — H&P ADULT - HISTORY OF PRESENT ILLNESS
63M, Serbian-speaking, with hx of Non-Hodgkin's Lymphoma on Rituxan (last 02/2023), Hepatitis B (on entecavir), Campylobacter/EPEC gastroenteritis (11/2022), HLD, BPH who presents with loose stools x 3 day duration. Patient has been having watery diarrhea for the past 3 days, associated with decreased PO intake and weakness. He reports one BM with ?hematochezia, however subsequent bowel movements have been brown, last around 4AM in ED. Of note, patient completed abx 2 weeks ago for a cough. Patient went to [facility] initially, however care was taking too long, left and came here. He denies fevers, chills, cp, n/v.

## 2023-04-14 NOTE — CONSULT NOTE ADULT - ASSESSMENT
Incomplete Note    Pt to be seen  62 yo M with a PMH of NHL on Rituxan (last dose Feb 2023), Hep B (Entecavir), BPH, recent admission in Oct 2022 for Campylobacter and EPEC gastroenteritis s/p treatment with Azithromycin, who presented to the ED with abd pain and diarrhea for the last 3 days.     Diarrhea resolved  Abd pain present  Recent abx course ~2 weeks ago  No fevers, no leukocytosis  Mild bandemia 7%  Covid-19/RSV/Influenza PCR negative  CT A/P with evidence of known LAD, and also with new colitis and typhlitis  LUE IV phlebitis while receiving Zosyn and Morphine  No diffuse rash, was only localized to LUE IV, doubt that it was due to Zosyn    OVERALL  H/o NHL on Rituxan  Abd pain  Diarrhea (resolved)  Bandemia, sepsis  Colitis and typhlitis     RECOMMENDATIONS  -Restart Zosyn, doubt localized reaction was 2/2 Zosyn, more likely infiltrated, evidence of mild phlebitis on exam as well   -Check GI PCR with next BM  -Given diarrhea has resolved low suspicion for C Diff   -F/u blood cultures     All recommendations are tentative pending Attending Attestation.    Jaun Wu MD, PGY-4   ID Fellow  [employer] Teams Preferred  After 5pm/weekends call [phone]  62 yo M with a PMH of NHL on Rituxan (last dose Feb 2023), Hep B (Entecavir), BPH, recent admission in Oct 2022 for Campylobacter and EPEC gastroenteritis s/p treatment with Azithromycin, who presented to the ED with abd pain and diarrhea for the last 3 days.     Diarrhea resolved  Abd pain present  Recent abx course ~2 weeks ago  No fevers, no leukocytosis  Mild bandemia 7%  Covid-19/RSV/Influenza PCR negative  CT A/P with evidence of known LAD, and also with new colitis and typhlitis  LUE IV phlebitis while receiving Zosyn and Morphine  No diffuse rash, was only localized to LUE IV, doubt that it was due to Zosyn    OVERALL  H/o NHL on Rituxan  Abd pain  Diarrhea (resolved)  Bandemia, sepsis  Colitis and typhlitis     RECOMMENDATIONS  -Restart Zosyn, doubt localized reaction was 2/2 Zosyn, more likely infiltrated, evidence of mild phlebitis on exam as well, remove LUE IV  -Check GI PCR with next BM  -Given diarrhea has resolved low suspicion for C Diff   -F/u blood cultures     All recommendations are tentative pending Attending Attestation.    Jaun Wu MD, PGY-4   ID Fellow  [employer] Teams Preferred  After 5pm/weekends call [phone]

## 2023-04-14 NOTE — ED ADULT NURSE REASSESSMENT NOTE - NS ED NURSE REASSESS COMMENT FT1
Patient A04, lying comfortably in stretcher with son at bedside. Patient IV site changed aseptically. IV abx started. Patient denies pain. Has no complaints.
Patient A04, laying in bed comfortably with son at bedside. Patient having very small BM. Small sample sent for GI PCR. Second IV placed aseptically. Blood work sent.  Side rails up. Safety precautions maintained. Patient awaiting for continuous LR. Patient has no complaints. In no distress.

## 2023-04-14 NOTE — CONSULT NOTE ADULT - ATTENDING COMMENTS
58 year old with NHL on rituximab presents with abdominal pain and diarrhea    CT with ? colitis vs typhlitis  Also shows malignant lymphadenopathy    Associated diarrhea    Now with new phlebitis    1) Abd pain  Imaging with typhilitis vs colitis  Check GI PCR  Check C diff    Continue empiric zosyn    2) Phlebitis  warm compresses   Check blood cultures- sent on 4/13

## 2023-04-14 NOTE — PATIENT PROFILE ADULT - FALL HARM RISK - RISK INTERVENTIONS

## 2023-04-14 NOTE — H&P ADULT - PROBLEM SELECTOR PLAN 1
presents with loose BMs  - afebrile, WBC wnl, however with Bandemia to 7%  - CTAP: cecum and ascending colon are diffusely thickened which is new since the previous exam. The terminal ileum is slightly thickened as well, no proximal obstruction. This may represent colitis versus typhlitis versus lymphoma.  - check GI PCR + C.diff, follow up BCx, trend CBC with diff  - received zosyn + morphine on admission at same time with subsequent erythema + pruritis around LUE PIV site, unclear if allergic reaction vs. infiltrated IV (patient tolerated both morphine and zosyn on prior admission), discussed with ID fellow will switch to cipro/flagyl for now pending ID evaluation, replace IV  - reported one episode of hematochezia that has since resolved, Hb stable, low suspicion for GIB, check fecal occult presents with loose BMs  - afebrile, WBC wnl, however with Bandemia to 7%  - CTAP: cecum and ascending colon are diffusely thickened which is new since the previous exam. The terminal ileum is slightly thickened as well, no proximal obstruction. This may represent colitis versus typhlitis versus lymphoma.  - check GI PCR + C.diff, follow up BCx, trend CBC with diff  - received zosyn + morphine on admission at same time with subsequent erythema + pruritis around LUE PIV site, patient has received both medications on prior admission without adverse affect. Discussed with ID fellow - sx and clinical exam more c/w PIV infiltration - RN changed IV, per ID ok to c/w Zosyn   - reported one episode of hematochezia that has since resolved, Hb stable, low suspicion for GIB, check fecal occult

## 2023-04-14 NOTE — H&P ADULT - NSHPPHYSICALEXAM_GEN_ALL_CORE
Vital Signs Last 24 Hrs  T(C): 36.8 (14 Apr 2023 04:10), Max: 37.4 (13 Apr 2023 20:58)  T(F): 98.2 (14 Apr 2023 04:10), Max: 99.3 (13 Apr 2023 20:58)  HR: 77 (14 Apr 2023 04:10) (77 - 98)  BP: 123/76 (14 Apr 2023 04:10) (123/67 - 133/75)  RR: 16 (14 Apr 2023 04:10) (16 - 18)  SpO2: 100% (14 Apr 2023 04:10) (100% - 100%)    Parameters below as of 13 Apr 2023 23:34  Patient On (Oxygen Delivery Method): room air        CONSTITUTIONAL: well-developed, well-groomed, no apparent distress  EYES: no conjunctival or scleral injection, non-icteric, PERRLA  ENMT: no external nasal lesions, oral mucosa with moist membranes  NECK: trachea midline, no palpable neck mass   RESPIRATORY: breathing comfortably, lungs CTA without wheeze/rales/rhonchi  CARDIOVASCULAR: regular rate and rhythm; +S1S2, no murmurs, rubs, or gallops, no lower extremity edema, 2+ peripheral pulses  GASTROINTESTINAL: soft, nontender, nondistended; +BS throughout, no rebound/guarding  MUSCULOSKELETAL: no joint effusions, normal strength and tone of extremities   NEUROLOGIC: non-focal, sensation intact to light touch in b/l upper and lower extremities   PSYCHIATRIC: AAOx3, appropriate mood and affect  SKIN: no rashes or lesions, warm

## 2023-04-14 NOTE — H&P ADULT - ASSESSMENT
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

## 2023-04-14 NOTE — H&P ADULT - PROBLEM SELECTOR PLAN 2
hx of non-hodgkin's lymphoma on chemo (last 02/2023)  - follows with Dr. Alondra Mcgill - next due for chemo 04/22/2023  - CTAP: Confluent retroperitoneal and mesenteric soft tissue likely neoplastic lymphadenopathy unchanged from 10/2022 imaging   - c/w home acyclovir 400mg BID, outpatient follow up with Dr. Cantu

## 2023-04-15 DIAGNOSIS — R05.9 COUGH, UNSPECIFIED: ICD-10-CM

## 2023-04-15 LAB
ANION GAP SERPL CALC-SCNC: 12 MMOL/L — SIGNIFICANT CHANGE UP (ref 7–14)
BASOPHILS # BLD AUTO: 0.07 K/UL — SIGNIFICANT CHANGE UP (ref 0–0.2)
BASOPHILS NFR BLD AUTO: 0.6 % — SIGNIFICANT CHANGE UP (ref 0–2)
BUN SERPL-MCNC: 12 MG/DL — SIGNIFICANT CHANGE UP (ref 7–23)
C DIFF BY PCR RESULT: SIGNIFICANT CHANGE UP
CALCIUM SERPL-MCNC: 9.1 MG/DL — SIGNIFICANT CHANGE UP (ref 8.4–10.5)
CHLORIDE SERPL-SCNC: 103 MMOL/L — SIGNIFICANT CHANGE UP (ref 98–107)
CO2 SERPL-SCNC: 22 MMOL/L — SIGNIFICANT CHANGE UP (ref 22–31)
CREAT SERPL-MCNC: 1.2 MG/DL — SIGNIFICANT CHANGE UP (ref 0.5–1.3)
EGFR: 67 ML/MIN/1.73M2 — SIGNIFICANT CHANGE UP
EOSINOPHIL # BLD AUTO: 8.27 K/UL — HIGH (ref 0–0.5)
EOSINOPHIL NFR BLD AUTO: 67.9 % — HIGH (ref 0–6)
GLUCOSE SERPL-MCNC: 108 MG/DL — HIGH (ref 70–99)
HCT VFR BLD CALC: 40.5 % — SIGNIFICANT CHANGE UP (ref 39–50)
HGB BLD-MCNC: 13 G/DL — SIGNIFICANT CHANGE UP (ref 13–17)
IANC: 2.23 K/UL — SIGNIFICANT CHANGE UP (ref 1.8–7.4)
IMM GRANULOCYTES NFR BLD AUTO: 1.6 % — HIGH (ref 0–0.9)
LYMPHOCYTES # BLD AUTO: 0.78 K/UL — LOW (ref 1–3.3)
LYMPHOCYTES # BLD AUTO: 6.4 % — LOW (ref 13–44)
MAGNESIUM SERPL-MCNC: 2.2 MG/DL — SIGNIFICANT CHANGE UP (ref 1.6–2.6)
MCHC RBC-ENTMCNC: 29.2 PG — SIGNIFICANT CHANGE UP (ref 27–34)
MCHC RBC-ENTMCNC: 32.1 GM/DL — SIGNIFICANT CHANGE UP (ref 32–36)
MCV RBC AUTO: 91 FL — SIGNIFICANT CHANGE UP (ref 80–100)
MONOCYTES # BLD AUTO: 0.64 K/UL — SIGNIFICANT CHANGE UP (ref 0–0.9)
MONOCYTES NFR BLD AUTO: 5.3 % — SIGNIFICANT CHANGE UP (ref 2–14)
MRSA PCR RESULT.: SIGNIFICANT CHANGE UP
NEUTROPHILS # BLD AUTO: 2.23 K/UL — SIGNIFICANT CHANGE UP (ref 1.8–7.4)
NEUTROPHILS NFR BLD AUTO: 18.2 % — LOW (ref 43–77)
NRBC # BLD: 0 /100 WBCS — SIGNIFICANT CHANGE UP (ref 0–0)
NRBC # FLD: 0 K/UL — SIGNIFICANT CHANGE UP (ref 0–0)
PHOSPHATE SERPL-MCNC: 4.3 MG/DL — SIGNIFICANT CHANGE UP (ref 2.5–4.5)
PLATELET # BLD AUTO: 258 K/UL — SIGNIFICANT CHANGE UP (ref 150–400)
POTASSIUM SERPL-MCNC: 3.5 MMOL/L — SIGNIFICANT CHANGE UP (ref 3.5–5.3)
POTASSIUM SERPL-SCNC: 3.5 MMOL/L — SIGNIFICANT CHANGE UP (ref 3.5–5.3)
RBC # BLD: 4.45 M/UL — SIGNIFICANT CHANGE UP (ref 4.2–5.8)
RBC # FLD: 13.5 % — SIGNIFICANT CHANGE UP (ref 10.3–14.5)
S AUREUS DNA NOSE QL NAA+PROBE: DETECTED
SODIUM SERPL-SCNC: 137 MMOL/L — SIGNIFICANT CHANGE UP (ref 135–145)
WBC # BLD: 12.18 K/UL — HIGH (ref 3.8–10.5)
WBC # FLD AUTO: 12.18 K/UL — HIGH (ref 3.8–10.5)

## 2023-04-15 PROCEDURE — 99232 SBSQ HOSP IP/OBS MODERATE 35: CPT

## 2023-04-15 RX ORDER — LIDOCAINE 4 G/100G
1 CREAM TOPICAL EVERY 24 HOURS
Refills: 0 | Status: ACTIVE | OUTPATIENT
Start: 2023-04-15 | End: 2024-03-13

## 2023-04-15 RX ORDER — ACETAMINOPHEN 500 MG
650 TABLET ORAL ONCE
Refills: 0 | Status: COMPLETED | OUTPATIENT
Start: 2023-04-15 | End: 2023-04-15

## 2023-04-15 RX ORDER — MORPHINE SULFATE 50 MG/1
2 CAPSULE, EXTENDED RELEASE ORAL ONCE
Refills: 0 | Status: DISCONTINUED | OUTPATIENT
Start: 2023-04-15 | End: 2023-04-15

## 2023-04-15 RX ADMIN — Medication 5 MILLIGRAM(S): at 14:54

## 2023-04-15 RX ADMIN — Medication 100 MILLIGRAM(S): at 22:52

## 2023-04-15 RX ADMIN — Medication 100 MILLIGRAM(S): at 14:57

## 2023-04-15 RX ADMIN — Medication 81 MILLIGRAM(S): at 14:53

## 2023-04-15 RX ADMIN — PIPERACILLIN AND TAZOBACTAM 25 GRAM(S): 4; .5 INJECTION, POWDER, LYOPHILIZED, FOR SOLUTION INTRAVENOUS at 22:51

## 2023-04-15 RX ADMIN — ENOXAPARIN SODIUM 40 MILLIGRAM(S): 100 INJECTION SUBCUTANEOUS at 01:19

## 2023-04-15 RX ADMIN — Medication 400 MILLIGRAM(S): at 18:17

## 2023-04-15 RX ADMIN — LIDOCAINE 1 PATCH: 4 CREAM TOPICAL at 10:16

## 2023-04-15 RX ADMIN — LIDOCAINE 1 PATCH: 4 CREAM TOPICAL at 18:11

## 2023-04-15 RX ADMIN — ATORVASTATIN CALCIUM 20 MILLIGRAM(S): 80 TABLET, FILM COATED ORAL at 22:52

## 2023-04-15 RX ADMIN — Medication 650 MILLIGRAM(S): at 23:59

## 2023-04-15 RX ADMIN — PIPERACILLIN AND TAZOBACTAM 25 GRAM(S): 4; .5 INJECTION, POWDER, LYOPHILIZED, FOR SOLUTION INTRAVENOUS at 14:53

## 2023-04-15 RX ADMIN — ENTECAVIR 0.5 MILLIGRAM(S): 0.5 TABLET ORAL at 14:52

## 2023-04-15 RX ADMIN — SODIUM CHLORIDE 75 MILLILITER(S): 9 INJECTION, SOLUTION INTRAVENOUS at 04:13

## 2023-04-15 RX ADMIN — Medication 400 MILLIGRAM(S): at 06:28

## 2023-04-15 RX ADMIN — CHLORHEXIDINE GLUCONATE 1 APPLICATION(S): 213 SOLUTION TOPICAL at 02:00

## 2023-04-15 RX ADMIN — LIDOCAINE 1 PATCH: 4 CREAM TOPICAL at 22:00

## 2023-04-15 RX ADMIN — MORPHINE SULFATE 2 MILLIGRAM(S): 50 CAPSULE, EXTENDED RELEASE ORAL at 02:00

## 2023-04-15 RX ADMIN — PIPERACILLIN AND TAZOBACTAM 25 GRAM(S): 4; .5 INJECTION, POWDER, LYOPHILIZED, FOR SOLUTION INTRAVENOUS at 06:28

## 2023-04-15 RX ADMIN — Medication 600 MILLIGRAM(S): at 18:17

## 2023-04-15 RX ADMIN — TAMSULOSIN HYDROCHLORIDE 0.4 MILLIGRAM(S): 0.4 CAPSULE ORAL at 22:52

## 2023-04-15 RX ADMIN — MORPHINE SULFATE 2 MILLIGRAM(S): 50 CAPSULE, EXTENDED RELEASE ORAL at 01:19

## 2023-04-15 NOTE — PROGRESS NOTE ADULT - PROBLEM SELECTOR PLAN 1
presents with loose BMs  - afebrile, WBC wnl, however with Bandemia to 7%  - CTAP: cecum and ascending colon are diffusely thickened which is new since the previous exam. The terminal ileum is slightly thickened as well, no proximal obstruction. This may represent colitis versus typhlitis versus lymphoma.  -W/ one episode of hematochezia on admission, now seemingly resolved  - check GI PCR + C.diff, follow up BCx, trend CBC with diff  - c/w zosyn

## 2023-04-16 DIAGNOSIS — N17.9 ACUTE KIDNEY FAILURE, UNSPECIFIED: ICD-10-CM

## 2023-04-16 LAB
ANION GAP SERPL CALC-SCNC: 11 MMOL/L — SIGNIFICANT CHANGE UP (ref 7–14)
BUN SERPL-MCNC: 11 MG/DL — SIGNIFICANT CHANGE UP (ref 7–23)
CALCIUM SERPL-MCNC: 9.4 MG/DL — SIGNIFICANT CHANGE UP (ref 8.4–10.5)
CHLORIDE SERPL-SCNC: 105 MMOL/L — SIGNIFICANT CHANGE UP (ref 98–107)
CO2 SERPL-SCNC: 24 MMOL/L — SIGNIFICANT CHANGE UP (ref 22–31)
CREAT ?TM UR-MCNC: 24 MG/DL — SIGNIFICANT CHANGE UP
CREAT SERPL-MCNC: 1.42 MG/DL — HIGH (ref 0.5–1.3)
EGFR: 55 ML/MIN/1.73M2 — LOW
GI PCR PANEL: SIGNIFICANT CHANGE UP
GLUCOSE SERPL-MCNC: 106 MG/DL — HIGH (ref 70–99)
HCT VFR BLD CALC: 41 % — SIGNIFICANT CHANGE UP (ref 39–50)
HGB BLD-MCNC: 13 G/DL — SIGNIFICANT CHANGE UP (ref 13–17)
MAGNESIUM SERPL-MCNC: 2.2 MG/DL — SIGNIFICANT CHANGE UP (ref 1.6–2.6)
MCHC RBC-ENTMCNC: 29.2 PG — SIGNIFICANT CHANGE UP (ref 27–34)
MCHC RBC-ENTMCNC: 31.7 GM/DL — LOW (ref 32–36)
MCV RBC AUTO: 92.1 FL — SIGNIFICANT CHANGE UP (ref 80–100)
NRBC # BLD: 0 /100 WBCS — SIGNIFICANT CHANGE UP (ref 0–0)
NRBC # FLD: 0 K/UL — SIGNIFICANT CHANGE UP (ref 0–0)
OB PNL STL: NEGATIVE — SIGNIFICANT CHANGE UP
PHOSPHATE SERPL-MCNC: 4.8 MG/DL — HIGH (ref 2.5–4.5)
PLATELET # BLD AUTO: 274 K/UL — SIGNIFICANT CHANGE UP (ref 150–400)
POTASSIUM SERPL-MCNC: 4 MMOL/L — SIGNIFICANT CHANGE UP (ref 3.5–5.3)
POTASSIUM SERPL-SCNC: 4 MMOL/L — SIGNIFICANT CHANGE UP (ref 3.5–5.3)
RBC # BLD: 4.45 M/UL — SIGNIFICANT CHANGE UP (ref 4.2–5.8)
RBC # FLD: 13.5 % — SIGNIFICANT CHANGE UP (ref 10.3–14.5)
SODIUM SERPL-SCNC: 140 MMOL/L — SIGNIFICANT CHANGE UP (ref 135–145)
SODIUM UR-SCNC: 56 MMOL/L — SIGNIFICANT CHANGE UP
WBC # BLD: 12.72 K/UL — HIGH (ref 3.8–10.5)
WBC # FLD AUTO: 12.72 K/UL — HIGH (ref 3.8–10.5)

## 2023-04-16 PROCEDURE — 99232 SBSQ HOSP IP/OBS MODERATE 35: CPT

## 2023-04-16 PROCEDURE — 71045 X-RAY EXAM CHEST 1 VIEW: CPT | Mod: 26

## 2023-04-16 RX ORDER — SODIUM CHLORIDE 9 MG/ML
1000 INJECTION INTRAMUSCULAR; INTRAVENOUS; SUBCUTANEOUS
Refills: 0 | Status: ACTIVE | OUTPATIENT
Start: 2023-04-16 | End: 2023-04-17

## 2023-04-16 RX ORDER — MUPIROCIN 20 MG/G
1 OINTMENT TOPICAL
Refills: 0 | Status: ACTIVE | OUTPATIENT
Start: 2023-04-16 | End: 2023-04-21

## 2023-04-16 RX ADMIN — ENOXAPARIN SODIUM 40 MILLIGRAM(S): 100 INJECTION SUBCUTANEOUS at 01:08

## 2023-04-16 RX ADMIN — MUPIROCIN 1 APPLICATION(S): 20 OINTMENT TOPICAL at 18:37

## 2023-04-16 RX ADMIN — PIPERACILLIN AND TAZOBACTAM 25 GRAM(S): 4; .5 INJECTION, POWDER, LYOPHILIZED, FOR SOLUTION INTRAVENOUS at 21:37

## 2023-04-16 RX ADMIN — Medication 100 MILLIGRAM(S): at 06:32

## 2023-04-16 RX ADMIN — Medication 600 MILLIGRAM(S): at 17:37

## 2023-04-16 RX ADMIN — CHLORHEXIDINE GLUCONATE 1 APPLICATION(S): 213 SOLUTION TOPICAL at 15:06

## 2023-04-16 RX ADMIN — Medication 100 MILLIGRAM(S): at 21:37

## 2023-04-16 RX ADMIN — Medication 600 MILLIGRAM(S): at 06:32

## 2023-04-16 RX ADMIN — PIPERACILLIN AND TAZOBACTAM 25 GRAM(S): 4; .5 INJECTION, POWDER, LYOPHILIZED, FOR SOLUTION INTRAVENOUS at 06:32

## 2023-04-16 RX ADMIN — ATORVASTATIN CALCIUM 20 MILLIGRAM(S): 80 TABLET, FILM COATED ORAL at 21:39

## 2023-04-16 RX ADMIN — Medication 100 MILLIGRAM(S): at 15:04

## 2023-04-16 RX ADMIN — Medication 400 MILLIGRAM(S): at 06:33

## 2023-04-16 RX ADMIN — Medication 5 MILLIGRAM(S): at 15:04

## 2023-04-16 RX ADMIN — TAMSULOSIN HYDROCHLORIDE 0.4 MILLIGRAM(S): 0.4 CAPSULE ORAL at 21:38

## 2023-04-16 RX ADMIN — Medication 400 MILLIGRAM(S): at 17:37

## 2023-04-16 RX ADMIN — PIPERACILLIN AND TAZOBACTAM 25 GRAM(S): 4; .5 INJECTION, POWDER, LYOPHILIZED, FOR SOLUTION INTRAVENOUS at 15:02

## 2023-04-16 RX ADMIN — ENTECAVIR 0.5 MILLIGRAM(S): 0.5 TABLET ORAL at 15:04

## 2023-04-16 RX ADMIN — Medication 650 MILLIGRAM(S): at 00:59

## 2023-04-16 RX ADMIN — Medication 81 MILLIGRAM(S): at 15:04

## 2023-04-16 RX ADMIN — SODIUM CHLORIDE 75 MILLILITER(S): 9 INJECTION INTRAMUSCULAR; INTRAVENOUS; SUBCUTANEOUS at 15:04

## 2023-04-16 NOTE — PROGRESS NOTE ADULT - PROBLEM SELECTOR PLAN 1
presents with loose BMs  - afebrile, WBC wnl, however with Bandemia to 7%  - Now improving  - CTAP: cecum and ascending colon are diffusely thickened which is new since the previous exam. The terminal ileum is slightly thickened as well, no proximal obstruction. This may represent colitis versus typhlitis versus lymphoma.  -W/ one episode of hematochezia on admission, now seemingly resolved  - GI PCR/C Diff neg  - c/w zosyn  - Appreciate ID recs

## 2023-04-17 ENCOUNTER — TRANSCRIPTION ENCOUNTER (OUTPATIENT)
Age: 63
End: 2023-04-17

## 2023-04-17 VITALS
SYSTOLIC BLOOD PRESSURE: 126 MMHG | TEMPERATURE: 98 F | DIASTOLIC BLOOD PRESSURE: 74 MMHG | OXYGEN SATURATION: 96 % | HEART RATE: 83 BPM

## 2023-04-17 LAB
ANION GAP SERPL CALC-SCNC: 12 MMOL/L — SIGNIFICANT CHANGE UP (ref 7–14)
BUN SERPL-MCNC: 10 MG/DL — SIGNIFICANT CHANGE UP (ref 7–23)
CALCIUM SERPL-MCNC: 9.1 MG/DL — SIGNIFICANT CHANGE UP (ref 8.4–10.5)
CHLORIDE SERPL-SCNC: 106 MMOL/L — SIGNIFICANT CHANGE UP (ref 98–107)
CO2 SERPL-SCNC: 23 MMOL/L — SIGNIFICANT CHANGE UP (ref 22–31)
CREAT SERPL-MCNC: 1.22 MG/DL — SIGNIFICANT CHANGE UP (ref 0.5–1.3)
EGFR: 66 ML/MIN/1.73M2 — SIGNIFICANT CHANGE UP
GLUCOSE SERPL-MCNC: 104 MG/DL — HIGH (ref 70–99)
HCT VFR BLD CALC: 42.2 % — SIGNIFICANT CHANGE UP (ref 39–50)
HGB BLD-MCNC: 13.4 G/DL — SIGNIFICANT CHANGE UP (ref 13–17)
MAGNESIUM SERPL-MCNC: 2.3 MG/DL — SIGNIFICANT CHANGE UP (ref 1.6–2.6)
MCHC RBC-ENTMCNC: 29.1 PG — SIGNIFICANT CHANGE UP (ref 27–34)
MCHC RBC-ENTMCNC: 31.8 GM/DL — LOW (ref 32–36)
MCV RBC AUTO: 91.5 FL — SIGNIFICANT CHANGE UP (ref 80–100)
NRBC # BLD: 0 /100 WBCS — SIGNIFICANT CHANGE UP (ref 0–0)
NRBC # FLD: 0 K/UL — SIGNIFICANT CHANGE UP (ref 0–0)
PHOSPHATE SERPL-MCNC: 4.2 MG/DL — SIGNIFICANT CHANGE UP (ref 2.5–4.5)
PLATELET # BLD AUTO: 271 K/UL — SIGNIFICANT CHANGE UP (ref 150–400)
POTASSIUM SERPL-MCNC: 3.8 MMOL/L — SIGNIFICANT CHANGE UP (ref 3.5–5.3)
POTASSIUM SERPL-SCNC: 3.8 MMOL/L — SIGNIFICANT CHANGE UP (ref 3.5–5.3)
RBC # BLD: 4.61 M/UL — SIGNIFICANT CHANGE UP (ref 4.2–5.8)
RBC # FLD: 13.6 % — SIGNIFICANT CHANGE UP (ref 10.3–14.5)
SODIUM SERPL-SCNC: 141 MMOL/L — SIGNIFICANT CHANGE UP (ref 135–145)
WBC # BLD: 12.77 K/UL — HIGH (ref 3.8–10.5)
WBC # FLD AUTO: 12.77 K/UL — HIGH (ref 3.8–10.5)

## 2023-04-17 PROCEDURE — 99232 SBSQ HOSP IP/OBS MODERATE 35: CPT

## 2023-04-17 PROCEDURE — 99239 HOSP IP/OBS DSCHRG MGMT >30: CPT

## 2023-04-17 RX ORDER — LOPERAMIDE HCL 2 MG
1 TABLET ORAL
Qty: 20 | Refills: 0
Start: 2023-04-17 | End: 2023-04-21

## 2023-04-17 RX ORDER — LIDOCAINE 4 G/100G
1 CREAM TOPICAL
Qty: 2 | Refills: 0
Start: 2023-04-17

## 2023-04-17 RX ORDER — MUPIROCIN 20 MG/G
1 OINTMENT TOPICAL
Qty: 1 | Refills: 0
Start: 2023-04-17 | End: 2023-04-20

## 2023-04-17 RX ADMIN — Medication 600 MILLIGRAM(S): at 17:32

## 2023-04-17 RX ADMIN — ENOXAPARIN SODIUM 40 MILLIGRAM(S): 100 INJECTION SUBCUTANEOUS at 01:08

## 2023-04-17 RX ADMIN — ENTECAVIR 0.5 MILLIGRAM(S): 0.5 TABLET ORAL at 11:32

## 2023-04-17 RX ADMIN — CHLORHEXIDINE GLUCONATE 1 APPLICATION(S): 213 SOLUTION TOPICAL at 11:32

## 2023-04-17 RX ADMIN — Medication 100 MILLIGRAM(S): at 15:42

## 2023-04-17 RX ADMIN — MUPIROCIN 1 APPLICATION(S): 20 OINTMENT TOPICAL at 06:26

## 2023-04-17 RX ADMIN — Medication 400 MILLIGRAM(S): at 17:32

## 2023-04-17 RX ADMIN — Medication 600 MILLIGRAM(S): at 06:24

## 2023-04-17 RX ADMIN — Medication 5 MILLIGRAM(S): at 11:32

## 2023-04-17 RX ADMIN — PIPERACILLIN AND TAZOBACTAM 25 GRAM(S): 4; .5 INJECTION, POWDER, LYOPHILIZED, FOR SOLUTION INTRAVENOUS at 13:52

## 2023-04-17 RX ADMIN — MUPIROCIN 1 APPLICATION(S): 20 OINTMENT TOPICAL at 17:33

## 2023-04-17 RX ADMIN — PIPERACILLIN AND TAZOBACTAM 25 GRAM(S): 4; .5 INJECTION, POWDER, LYOPHILIZED, FOR SOLUTION INTRAVENOUS at 06:20

## 2023-04-17 RX ADMIN — Medication 400 MILLIGRAM(S): at 06:26

## 2023-04-17 RX ADMIN — Medication 100 MILLIGRAM(S): at 06:26

## 2023-04-17 RX ADMIN — Medication 81 MILLIGRAM(S): at 11:31

## 2023-04-17 NOTE — PROGRESS NOTE ADULT - PROBLEM SELECTOR PLAN 7
DVT ppx: lovenox  DIET: Regular  DISPO: Home
- c/w tamsulosin 0.4mg daily
- c/w tamsulosin 0.4mg daily

## 2023-04-17 NOTE — DISCHARGE NOTE PROVIDER - NSDCMRMEDTOKEN_GEN_ALL_CORE_FT
acyclovir 400 mg oral tablet: 1 tab(s) orally 2 times a day  aspirin 81 mg oral tablet: 1 tab(s) orally once a day  atorvastatin 20 mg oral tablet: 1 tab(s) orally once a day (at bedtime)  entecavir 0.5 mg oral tablet: 1 tab(s) orally once a day  oxybutynin 5 mg oral tablet: 1 tab(s) orally once a day  tamsulosin 0.4 mg oral capsule: 1 tab(s) orally once a day   acyclovir 400 mg oral tablet: 1 tab(s) orally 2 times a day  amoxicillin-clavulanate 875 mg-125 mg oral tablet: 1 tab(s) orally 2 times a day  aspirin 81 mg oral tablet: 1 tab(s) orally once a day  atorvastatin 20 mg oral tablet: 1 tab(s) orally once a day (at bedtime)  benzonatate 100 mg oral capsule: 1 cap(s) orally 3 times a day  entecavir 0.5 mg oral tablet: 1 tab(s) orally once a day  guaiFENesin 600 mg oral tablet, extended release: 1 tab(s) orally every 12 hours  lidocaine 4% topical film: Apply topically to affected area once a day  mupirocin 2% topical ointment: 1 application in each nostril 2 times a day  oxybutynin 5 mg oral tablet: 1 tab(s) orally once a day  tamsulosin 0.4 mg oral capsule: 1 tab(s) orally once a day   acyclovir 400 mg oral tablet: 1 tab(s) orally 2 times a day  amoxicillin-clavulanate 875 mg-125 mg oral tablet: 1 tab(s) orally 2 times a day  aspirin 81 mg oral tablet: 1 tab(s) orally once a day  atorvastatin 20 mg oral tablet: 1 tab(s) orally once a day (at bedtime)  benzonatate 100 mg oral capsule: 1 cap(s) orally 3 times a day  entecavir 0.5 mg oral tablet: 1 tab(s) orally once a day  guaiFENesin 600 mg oral tablet, extended release: 1 tab(s) orally every 12 hours  Imodium 2 mg oral capsule: 1 cap(s) orally every 6 hours as needed for  diarrhea  lidocaine 4% topical film: Apply topically to affected area once a day  mupirocin 2% topical ointment: 1 application in each nostril 2 times a day  oxybutynin 5 mg oral tablet: 1 tab(s) orally once a day  tamsulosin 0.4 mg oral capsule: 1 tab(s) orally once a day

## 2023-04-17 NOTE — DISCHARGE NOTE NURSING/CASE MANAGEMENT/SOCIAL WORK - PATIENT PORTAL LINK FT
You can access the FollowMyHealth Patient Portal offered by [facility] by registering at the following website: [email or web address]. By joining [facility]’s FollowMyHealth portal, you will also be able to view your health information using other applications (apps) compatible with our system.

## 2023-04-17 NOTE — DISCHARGE NOTE PROVIDER - NSDCCPTREATMENT_GEN_ALL_CORE_FT
PRINCIPAL PROCEDURE  Procedure: CT abdomen  Findings and Treatment: PROCEDURE:  CT of the Abdomen and Pelvis was performed.  Sagittal and coronal reformats were performed.  FINDINGS:  LOWER CHEST: Coronary artery calcifications. Mild left basilar   atelectasis.  LIVER: Within normal limits.  BILE DUCTS: Normal caliber.  GALLBLADDER: Cholecystectomy.  SPLEEN: Within normal limits.  PANCREAS: Within normal limits.  ADRENALS: Within normal limits.  KIDNEYS/URETERS: Right renal cyst  BLADDER: Mild diffuse bladder wall thickening.  REPRODUCTIVE ORGANS: Prostate is enlarged with mass effect on the urinary   bladder.  BOWEL: The cecum and ascending colon are diffusely thickened which is new   since the previous exam. The terminal ileum is slightly thickened as   well. There is no proximal obstruction. This may represent colitis versus   typhlitis versus lymphoma.  PERITONEUM: No ascites.  VESSELS: Within normal limits.  RETROPERITONEUM/LYMPH NODES: Again seen is confluent soft tissue in the   retroperitoneum. For example a left para-aortic component measures 4.2 x   2.9 cm. This extends into the base of the mesentery. The overall   appearance is not significantly changed from the prior exam dated this   likely represents lymphadenopathy likely neoplastic.  ABDOMINAL WALL: Fat-containing left inguinal hernia  BONES: Within normal limits.  IMPRESSION:  Confluent retroperitoneal and mesenteric soft tissue likely neoplastic   lymphadenopathy. Not significantly changed from the prior study of   10/28/2022.  The cecum and ascending colon are diffusely thickened which is new since   the previous exam. The terminal ileum is slightly thickened as well.   There is no proximal obstruction. This may represent colitis versus   typhlitis versus lymphoma.  --- End of Report ---

## 2023-04-17 NOTE — PROGRESS NOTE ADULT - PROBLEM SELECTOR PLAN 8
DVT ppx: lovenox  DIET: Regular  DISPO: Home DVT ppx: lovenox  DIET: Regular  DISPO: dc home today 4/17

## 2023-04-17 NOTE — PROGRESS NOTE ADULT - PROBLEM SELECTOR PLAN 3
w/ persistent cough x 3-4 weeks w/ mucus production.   Per son and pt, completed 2 courses of abx outpatient w/o improvement  -Resume mucinex  -Obtain CXR
hx of non-hodgkin's lymphoma on chemo (last 02/2023)  - follows with Dr. Alondra Mcgill - next due for chemo 04/22/2023  - CTAP: Confluent retroperitoneal and mesenteric soft tissue likely neoplastic lymphadenopathy unchanged from 10/2022 imaging   - c/w home acyclovir 400mg BID, outpatient follow up with Dr. Cantu
hx of non-hodgkin's lymphoma on chemo (last 02/2023)  - follows with Dr. Alondra Mcgill - next due for chemo 04/22/2023  - CTAP: Confluent retroperitoneal and mesenteric soft tissue likely neoplastic lymphadenopathy unchanged from 10/2022 imaging   - c/w home acyclovir 400mg BID, outpatient follow up with Dr. Cantu

## 2023-04-17 NOTE — DISCHARGE NOTE PROVIDER - NSDCCPCAREPLAN_GEN_ALL_CORE_FT
PRINCIPAL DISCHARGE DIAGNOSIS  Diagnosis: Colitis  Assessment and Plan of Treatment: You presented with diarrhea. Workup showed elevated wbc count. CT abdomen showed inflammation in your colon. A stool study for C.diff and GI PCR was negative. You received IV antibiotics at the hospital and the infectious disease team evaluated you. Please continue treatment with oral antibiotics as prescribed to complete 10 days (last day 4/22/23)      SECONDARY DISCHARGE DIAGNOSES  Diagnosis: Non Hodgkin's lymphoma  Assessment and Plan of Treatment: Please follow up with your oncologist as an outpatient.

## 2023-04-17 NOTE — PROGRESS NOTE ADULT - PROBLEM SELECTOR PLAN 1
presents with loose BMs  - afebrile, WBC wnl, however with Bandemia to 7%  - Now improving  - CTAP: cecum and ascending colon are diffusely thickened which is new since the previous exam. The terminal ileum is slightly thickened as well, no proximal obstruction. This may represent colitis versus typhlitis versus lymphoma.  -W/ one episode of hematochezia on admission, now seemingly resolved  - GI PCR/C Diff neg  - can likely dc zosyn since negative workup  -follow up ID recs presents with loose BMs  - afebrile, WBC wnl, however with Bandemia to 7%  - Now improving  - CTAP: cecum and ascending colon are diffusely thickened which is new since the previous exam. The terminal ileum is slightly thickened as well, no proximal obstruction. This may represent colitis versus typhlitis versus lymphoma.  -W/ one episode of hematochezia on admission, now seemingly resolved  - GI PCR/C Diff neg  - ID rec 10 days of Abx, can change to oral augmentin

## 2023-04-17 NOTE — DISCHARGE NOTE PROVIDER - HOSPITAL COURSE
63M, Syriac-speaking, with hx of Non-Hodgkin's Lymphoma on Rituxan (last 02/2023), Hepatitis B (on entecavir), Campylobacter/EPEC gastroenteritis (11/2022), HLD, BPH who presents with loose stools x 3 day duration, found to have bandemia. CT abdomen with colitis versus typhlitis vs lymphoma. GI PCR and C.diff was negative. Patient was treated with Zosyn and ID was consulted. Patient had SUNSHINE that resolved with IVFs and creatinine stable at 1.2 on discharge. Diarrhea significantly improved and bandemia resolved. Patient will be discharge to complete 10 days of antibiotics per ID, with OP PCP and heme/onc follow up.      63M, Divehi-speaking, with hx of Non-Hodgkin's Lymphoma on Rituxan (last 02/2023), Hepatitis B (on entecavir), Campylobacter/EPEC gastroenteritis (11/2022), HLD, BPH who presents with loose stools x 3 day duration, found to have bandemia. CT abdomen with colitis versus typhlitis vs lymphoma. GI PCR, C.diff stool test and blood cultures were negative. Patient was treated with Zosyn and ID was consulted. Patient had SUNSHINE that resolved with IVFs and creatinine stable at 1.2 on discharge. Diarrhea significantly improved and bandemia resolved. Patient will be discharge to complete 10 days of antibiotics per ID, with OP PCP and heme/onc follow up.      63M, Albanian-speaking, with hx of Non-Hodgkin's Lymphoma on Rituxan (last 02/2023), Hepatitis B (on entecavir), Campylobacter/EPEC gastroenteritis (11/2022), HLD, BPH who presents with loose stools x 3 day duration, found to have bandemia. CT abdomen with colitis versus typhlitis vs lymphoma. Also with neoplastic lymphadenopathy unchanged from 10/2022 imaging. GI PCR, C.diff stool test and blood cultures were negative. Patient was treated with Zosyn and ID was consulted. Patient had SUNSHINE that resolved with IVFs and creatinine stable at 1.2 on discharge. Diarrhea significantly improved and bandemia resolved. Patient will be discharge to complete 10 days of antibiotics per ID, with OP PCP and heme/onc follow up (Dr. Cantu).

## 2023-04-17 NOTE — PROGRESS NOTE ADULT - PROBLEM SELECTOR PLAN 4
w/ persistent cough x 3-4 weeks w/ mucus production.   Per son and pt, completed 2 courses of abx outpatient w/o improvement  -c/w mucinex  -CXR negative
w/ persistent cough x 3-4 weeks w/ mucus production.   Per son and pt, completed 2 courses of abx outpatient w/o improvement  -c/w mucinex  -CXR negative
- c/w entecavir 0.5mg daily

## 2023-04-17 NOTE — PROGRESS NOTE ADULT - SUBJECTIVE AND OBJECTIVE BOX
Follow Up:  lymphoma, abdominal pain     Interval History/ROS:   abdominal pain present right but improved   diarrhea better     Allergies  No Known Allergies        ANTIMICROBIALS:  acyclovir   Oral Tab/Cap 400 two times a day  entecavir 0.5 daily  piperacillin/tazobactam IVPB.. 3.375 every 8 hours      OTHER MEDS:  aspirin enteric coated 81 milliGRAM(s) Oral daily  atorvastatin 20 milliGRAM(s) Oral at bedtime  benzonatate 100 milliGRAM(s) Oral three times a day  chlorhexidine 2% Cloths 1 Application(s) Topical <User Schedule>  enoxaparin Injectable 40 milliGRAM(s) SubCutaneous every 24 hours  guaiFENesin  milliGRAM(s) Oral every 12 hours  lactated ringers. 1000 milliLiter(s) IV Continuous <Continuous>  lidocaine   4% Patch 1 Patch Transdermal every 24 hours  oxybutynin 5 milliGRAM(s) Oral daily  tamsulosin 0.4 milliGRAM(s) Oral at bedtime      Vital Signs Last 24 Hrs  T(C): 37 (15 Apr 2023 01:43), Max: 37 (15 Apr 2023 01:43)  T(F): 98.6 (15 Apr 2023 01:43), Max: 98.6 (15 Apr 2023 01:43)  HR: 78 (15 Apr 2023 01:43) (78 - 86)  BP: 121/73 (2023 21:00) (121/73 - 121/73)  BP(mean): --  RR: 18 (15 Apr 2023 01:43) (18 - 18)  SpO2: 100% (15 Apr 2023 01:43) (100% - 100%)    Parameters below as of 15 Apr 2023 01:43  Patient On (Oxygen Delivery Method): room air        PHYSICAL EXAM:  Constitutional: No distress  Eyes: No icterus.  Oral cavity: Clear, no lesions  Neck: Supple  RS: no respiratory distress RA  CVS: S1, S2   Abdomen: Soft. No guarding/rigidity.  TTP right mid  Extremities: Warm. No pedal edema  Skin: No rash  Neuro: Alert, oriented to time/place/person  Cranial nerves 2-12 grossly normal. No focal abnormalities                          13.0   12.18 )-----------( 258      ( 15 Apr 2023 06:55 )             40.5       04-15    137  |  103  |  12  ----------------------------<  108<H>  3.5   |  22  |  1.20    Ca    9.1      15 Apr 2023 06:55  Phos  4.3     04-15  Mg     2.20     04-15    TPro  7.0  /  Alb  4.0  /  TBili  0.6  /  DBili  x   /  AST  28  /  ALT  14  /  AlkPhos  119        Urinalysis Basic - ( 2023 04:18 )    Color: Light Yellow / Appearance: Clear / S.016 / pH: x  Gluc: x / Ketone: Negative  / Bili: Negative / Urobili: <2 mg/dL   Blood: x / Protein: Trace / Nitrite: Negative   Leuk Esterase: Negative / RBC: x / WBC x   Sq Epi: x / Non Sq Epi: x / Bacteria: x        MICROBIOLOGY:    .Blood Blood-Peripheral  23   No growth to date.  --  --      .Blood Blood-Peripheral  23   No growth to date.  --  --          RADIOLOGY:    rad< from: CT Abdomen and Pelvis w/ IV Cont (23 @ 04:46) >    ACC: [id number] EXAM:  CT ABDOMEN AND PELVIS IC   ORDERED BY: LISA LIU     PROCEDURE DATE:  2023          INTERPRETATION:  CLINICAL INFORMATION: Abnormal pain and diarrhea.   History of hepatitis B and lymphoma    COMPARISON: CT abdomen pelvis 10/28/2022.    CONTRAST/COMPLICATIONS:  IV Contrast: Omnipaque 350  95cc cc administered  Oral Contrast: NONE  Complications: None reported at time of study completion    PROCEDURE:  CT of the Abdomen and Pelvis was performed.  Sagittal and coronal reformats were performed.    FINDINGS:  LOWER CHEST: Coronary artery calcifications. Mild left basilar   atelectasis.    LIVER: Within normal limits.  BILE DUCTS: Normal caliber.  GALLBLADDER: Cholecystectomy.  SPLEEN: Within normal limits.  PANCREAS: Within normal limits.  ADRENALS: Within normal limits.  KIDNEYS/URETERS: Right renal cyst    BLADDER: Mild diffuse bladder wall thickening.  REPRODUCTIVE ORGANS: Prostate is enlarged with mass effect on the urinary   bladder.    BOWEL: The cecum and ascending colon are diffusely thickened which is new   since the previous exam. The terminal ileum is slightly thickened as   well. There is no proximal obstruction. This may represent colitis versus   typhlitis versus lymphoma.  PERITONEUM: No ascites.  VESSELS: Within normal limits.  RETROPERITONEUM/LYMPH NODES: Again seen is confluent soft tissue in the   retroperitoneum. For example a left para-aortic component measures 4.2 x   2.9 cm. This extends into the base of the mesentery. The overall   appearance is not significantly changed from the prior exam dated this   likely represents lymphadenopathy likely neoplastic.  ABDOMINAL WALL: Fat-containing left inguinal hernia  BONES: Within normal limits.    IMPRESSION:    Confluent retroperitonealand mesenteric soft tissue likely neoplastic   lymphadenopathy. Not significantly changed from the prior study of   10/28/2022.    The cecum and ascending colon are diffusely thickened which is new since   the previous exam. The terminal ileum is slightly thickened as well.   There is no proximal obstruction. This may represent colitis versus   typhlitis versus lymphoma.      --- End of Report ---            TERESE WHELAN MD; Attending Radiologist  This document has been electronically signed. 2023  5:11AM    < end of copied text >  
Follow Up:      Inverval History/ROS:Patient is a 63y old  Male who presents with a chief complaint of     Denies abd pain  no fever      Allergies    No Known Allergies    Intolerances        ANTIMICROBIALS:  acyclovir   Oral Tab/Cap 400 two times a day  entecavir 0.5 daily  piperacillin/tazobactam IVPB.. 3.375 every 8 hours      OTHER MEDS:  aspirin enteric coated 81 milliGRAM(s) Oral daily  atorvastatin 20 milliGRAM(s) Oral at bedtime  benzonatate 100 milliGRAM(s) Oral three times a day  chlorhexidine 2% Cloths 1 Application(s) Topical <User Schedule>  enoxaparin Injectable 40 milliGRAM(s) SubCutaneous every 24 hours  guaiFENesin  milliGRAM(s) Oral every 12 hours  lidocaine   4% Patch 1 Patch Transdermal every 24 hours  mupirocin 2% Ointment 1 Application(s) Topical two times a day  oxybutynin 5 milliGRAM(s) Oral daily  sodium chloride 0.9%. 1000 milliLiter(s) IV Continuous <Continuous>  tamsulosin 0.4 milliGRAM(s) Oral at bedtime      Vital Signs Last 24 Hrs  T(C): 36.7 (17 Apr 2023 13:19), Max: 36.9 (16 Apr 2023 21:45)  T(F): 98.1 (17 Apr 2023 13:19), Max: 98.5 (16 Apr 2023 21:45)  HR: 83 (17 Apr 2023 13:19) (79 - 83)  BP: 126/74 (17 Apr 2023 13:19) (113/71 - 126/74)  BP(mean): --  RR: 18 (17 Apr 2023 06:08) (18 - 18)  SpO2: 96% (17 Apr 2023 13:19) (95% - 96%)    Parameters below as of 17 Apr 2023 13:19  Patient On (Oxygen Delivery Method): room air        PHYSICAL EXAM:  General: [x ] non-toxic  HEAD/EYES: [ ] PERRL [ x] white sclera [ ] icterus  ENT:  [ ] normal [ x] supple [ ] thrush [ ] pharyngeal exudate  Cardiovascular:   [ ] murmur [x ] normal [ ] PPM/AICD  Respiratory:  [ x] clear to ausculation bilaterally  GI:  [ x] soft, non-tender, normal bowel sounds  :  [ ] miller [ ] no CVA tenderness   Musculoskeletal:  [x ] no synovitis  Neurologic:  [ ] non-focal exam   Skin:  [ x] no rash  Lymph: [ x] no lymphadenopathy  Psychiatric:  [ ] appropriate affect [ ] alert & oriented  Lines:  [x ] no phlebitis [ ] central line                                13.4   12.77 )-----------( 271      ( 17 Apr 2023 05:47 )             42.2       04-17    141  |  106  |  10  ----------------------------<  104<H>  3.8   |  23  |  1.22    Ca    9.1      17 Apr 2023 05:47  Phos  4.2     04-17  Mg     2.30     04-17            MICROBIOLOGY:Culture Results:   No growth to date. (04-13-23 @ 23:15)  Culture Results:   No growth to date. (04-13-23 @ 23:00)      RADIOLOGY:    
Isabelle Vu        Patient is a 63y old  Male who presents with a chief complaint of     SUBJECTIVE / OVERNIGHT EVENTS: No acute overnight events. This morning pt doing well. States diarrhea/abd pain improving, no complaints    MEDICATIONS  (STANDING):  acyclovir   Oral Tab/Cap 400 milliGRAM(s) Oral two times a day  aspirin enteric coated 81 milliGRAM(s) Oral daily  atorvastatin 20 milliGRAM(s) Oral at bedtime  benzonatate 100 milliGRAM(s) Oral three times a day  chlorhexidine 2% Cloths 1 Application(s) Topical <User Schedule>  enoxaparin Injectable 40 milliGRAM(s) SubCutaneous every 24 hours  entecavir 0.5 milliGRAM(s) Oral daily  guaiFENesin  milliGRAM(s) Oral every 12 hours  lidocaine   4% Patch 1 Patch Transdermal every 24 hours  mupirocin 2% Ointment 1 Application(s) Topical two times a day  oxybutynin 5 milliGRAM(s) Oral daily  piperacillin/tazobactam IVPB.. 3.375 Gram(s) IV Intermittent every 8 hours  sodium chloride 0.9%. 1000 milliLiter(s) (75 mL/Hr) IV Continuous <Continuous>  tamsulosin 0.4 milliGRAM(s) Oral at bedtime    MEDICATIONS  (PRN):    Allergies    No Known Allergies    Intolerances        Vital Signs Last 24 Hrs  T(C): 36.7 (16 Apr 2023 05:00), Max: 37 (15 Apr 2023 14:36)  T(F): 98 (16 Apr 2023 05:00), Max: 98.6 (15 Apr 2023 14:36)  HR: 74 (16 Apr 2023 05:00) (74 - 89)  BP: 108/70 (16 Apr 2023 05:00) (108/70 - 130/67)  BP(mean): --  RR: 16 (16 Apr 2023 05:00) (16 - 17)  SpO2: 94% (16 Apr 2023 05:00) (94% - 100%)    Parameters below as of 16 Apr 2023 05:00  Patient On (Oxygen Delivery Method): room air      Daily     Daily   CAPILLARY BLOOD GLUCOSE        I&O's Summary    15 Apr 2023 07:01  -  16 Apr 2023 07:00  --------------------------------------------------------  IN: 1200 mL / OUT: 150 mL / NET: 1050 mL        PHYSICAL EXAM:    CONSTITUTIONAL: well-developed  EYES: no conjunctival or scleral injection, non-icteric  NECK: supple  RESPIRATORY: lungs CTA without wheeze/rales/rhonchi  CARDIOVASCULAR: regular rate and rhythm; +S1S2, no murmurs, rubs, or gallops, no lower extremity edema, 2+ peripheral pulses  GASTROINTESTINAL: soft, nontender, nondistended; +BS throughout, no rebound/guarding  MUSCULOSKELETAL: no joint effusions, normal strength and tone of extremities   NEUROLOGIC: non-focal  PSYCHIATRIC: AAOx3, appropriate mood and affect  SKIN: no rashes or lesions, warm      LABS:                        13.0   12.72 )-----------( 274      ( 16 Apr 2023 06:20 )             41.0     Hgb Trend: 13.0<--, 13.0<--, 14.4<--  04-16    140  |  105  |  11  ----------------------------<  106<H>  4.0   |  24  |  1.42<H>    Ca    9.4      16 Apr 2023 06:20  Phos  4.8     04-16  Mg     2.20     04-16      Creatinine Trend: 1.42<--, 1.20<--, 1.04<--              RADIOLOGY & ADDITIONAL TESTS:    Imaging Personally Reviewed.    Consultant(s) Notes Reviewed.    Care Discussed with Consultants/Other Providers.      
Isabelle Vu        Patient is a 63y old  Male who presents with a chief complaint of     SUBJECTIVE / OVERNIGHT EVENTS: No acute overnight events. This morning pt doing well. Son at bedside. States diarrhea is much improved. c/o cough productive of yellow sputum ongoing > 3 weeks s/p 2 courses of abx. Otherwise w/o complaints        MEDICATIONS  (STANDING):  acyclovir   Oral Tab/Cap 400 milliGRAM(s) Oral two times a day  aspirin enteric coated 81 milliGRAM(s) Oral daily  atorvastatin 20 milliGRAM(s) Oral at bedtime  chlorhexidine 2% Cloths 1 Application(s) Topical <User Schedule>  enoxaparin Injectable 40 milliGRAM(s) SubCutaneous every 24 hours  entecavir 0.5 milliGRAM(s) Oral daily  lactated ringers. 1000 milliLiter(s) (75 mL/Hr) IV Continuous <Continuous>  lidocaine   4% Patch 1 Patch Transdermal every 24 hours  oxybutynin 5 milliGRAM(s) Oral daily  piperacillin/tazobactam IVPB.. 3.375 Gram(s) IV Intermittent every 8 hours  tamsulosin 0.4 milliGRAM(s) Oral at bedtime    MEDICATIONS  (PRN):    Allergies    No Known Allergies    Intolerances        Vital Signs Last 24 Hrs  T(C): 37 (15 Apr 2023 01:43), Max: 37 (15 Apr 2023 01:43)  T(F): 98.6 (15 Apr 2023 01:43), Max: 98.6 (15 Apr 2023 01:43)  HR: 78 (15 Apr 2023 01:43) (78 - 86)  BP: 121/73 (2023 21:00) (121/73 - 121/73)  BP(mean): --  RR: 18 (15 Apr 2023 01:43) (18 - 18)  SpO2: 100% (15 Apr 2023 01:43) (100% - 100%)    Parameters below as of 15 Apr 2023 01:43  Patient On (Oxygen Delivery Method): room air      Daily     Daily   CAPILLARY BLOOD GLUCOSE        I&O's Summary    15 Apr 2023 07:01  -  15 Apr 2023 13:07  --------------------------------------------------------  IN: 500 mL / OUT: 150 mL / NET: 350 mL        PHYSICAL EXAM:  CONSTITUTIONAL: well-developed  EYES: no conjunctival or scleral injection, non-icteric  NECK: supple  RESPIRATORY: lungs CTA without wheeze/rales/rhonchi  CARDIOVASCULAR: regular rate and rhythm; +S1S2, no murmurs, rubs, or gallops, no lower extremity edema, 2+ peripheral pulses  GASTROINTESTINAL: soft, nontender, nondistended; +BS throughout, no rebound/guarding  MUSCULOSKELETAL: no joint effusions, normal strength and tone of extremities   NEUROLOGIC: non-focal  PSYCHIATRIC: AAOx3, appropriate mood and affect  SKIN: no rashes or lesions, warm    LABS:                        13.0   12.18 )-----------( 258      ( 15 Apr 2023 06:55 )             40.5     Hgb Trend: 13.0<--, 14.4<--  04-15    137  |  103  |  12  ----------------------------<  108<H>  3.5   |  22  |  1.20    Ca    9.1      15 Apr 2023 06:55  Phos  4.3     04-15  Mg     2.20     04-15    TPro  7.0  /  Alb  4.0  /  TBili  0.6  /  DBili  x   /  AST  28  /  ALT  14  /  AlkPhos  119  04-13    Creatinine Trend: 1.20<--, 1.04<--  LIVER FUNCTIONS - ( 2023 23:11 )  Alb: 4.0 g/dL / Pro: 7.0 g/dL / ALK PHOS: 119 U/L / ALT: 14 U/L / AST: 28 U/L / GGT: x                 Urinalysis Basic - ( 2023 04:18 )    Color: Light Yellow / Appearance: Clear / S.016 / pH: x  Gluc: x / Ketone: Negative  / Bili: Negative / Urobili: <2 mg/dL   Blood: x / Protein: Trace / Nitrite: Negative   Leuk Esterase: Negative / RBC: x / WBC x   Sq Epi: x / Non Sq Epi: x / Bacteria: x        RADIOLOGY & ADDITIONAL TESTS:    Imaging Personally Reviewed.    Consultant(s) Notes Reviewed.    Care Discussed with Consultants/Other Providers.      
[facility] Division of Hospital Medicine  Zena Barlow MD  Available via MS Teams  Pager: [phone]    SUBJECTIVE / OVERNIGHT EVENTS:  no events. Patient states diarrhea is improving, initially ~18 times per day, now 4x per day with soft stool. Denies any abdominal pain.     MEDICATIONS  (STANDING):  acyclovir   Oral Tab/Cap 400 milliGRAM(s) Oral two times a day  aspirin enteric coated 81 milliGRAM(s) Oral daily  atorvastatin 20 milliGRAM(s) Oral at bedtime  benzonatate 100 milliGRAM(s) Oral three times a day  chlorhexidine 2% Cloths 1 Application(s) Topical <User Schedule>  enoxaparin Injectable 40 milliGRAM(s) SubCutaneous every 24 hours  entecavir 0.5 milliGRAM(s) Oral daily  guaiFENesin  milliGRAM(s) Oral every 12 hours  lidocaine   4% Patch 1 Patch Transdermal every 24 hours  mupirocin 2% Ointment 1 Application(s) Topical two times a day  oxybutynin 5 milliGRAM(s) Oral daily  piperacillin/tazobactam IVPB.. 3.375 Gram(s) IV Intermittent every 8 hours  sodium chloride 0.9%. 1000 milliLiter(s) (75 mL/Hr) IV Continuous <Continuous>  tamsulosin 0.4 milliGRAM(s) Oral at bedtime    MEDICATIONS  (PRN):      I&O's Summary    16 Apr 2023 07:01  -  17 Apr 2023 07:00  --------------------------------------------------------  IN: 900 mL / OUT: 200 mL / NET: 700 mL        PHYSICAL EXAM:  Vital Signs Last 24 Hrs  T(C): 36.7 (17 Apr 2023 13:19), Max: 36.9 (16 Apr 2023 21:45)  T(F): 98.1 (17 Apr 2023 13:19), Max: 98.5 (16 Apr 2023 21:45)  HR: 83 (17 Apr 2023 13:19) (79 - 87)  BP: 126/74 (17 Apr 2023 13:19) (113/71 - 126/74)  BP(mean): --  RR: 18 (17 Apr 2023 06:08) (17 - 18)  SpO2: 96% (17 Apr 2023 13:19) (95% - 96%)    Parameters below as of 17 Apr 2023 13:19  Patient On (Oxygen Delivery Method): room air      CONSTITUTIONAL: NAD  EYES: PERRLA; conjunctiva and sclera clear  ENMT: Moist oral mucosa, no pharyngeal injection or exudates  RESPIRATORY: Normal respiratory effort; lungs are clear to auscultation bilaterally  CARDIOVASCULAR: Regular rate and rhythm, normal S1 and S2, no murmur/rub/gallop; No lower extremity edema; Peripheral pulses are 2+ bilaterally  ABDOMEN: Nontender to palpation, normoactive bowel sounds, no rebound/guarding  MUSCULOSKELETAL:  no clubbing or cyanosis of digits; no joint swelling or tenderness to palpation  PSYCH: A+O to person, place, and time; affect appropriate  NEUROLOGY: CN 2-12 are intact and symmetric; no gross sensory deficits   SKIN: No rashes; no palpable lesions    LABS:                        13.4   12.77 )-----------( 271      ( 17 Apr 2023 05:47 )             42.2     04-17    141  |  106  |  10  ----------------------------<  104<H>  3.8   |  23  |  1.22    Ca    9.1      17 Apr 2023 05:47  Phos  4.2     04-17  Mg     2.30     04-17                SARS-CoV-2: NotDetec (28 Oct 2022 02:27)      Discussions with Patient/Family:

## 2023-04-17 NOTE — PROGRESS NOTE ADULT - ASSESSMENT
58 year old with NHL on rituximab presented 4/13 with abdominal pain and diarrhea    CT with ? colitis vs typhlitis  Also shows malignant lymphadenopathy      Abdominal pain present and Associated diarrhea now improved     1) Abd pain  Imaging with typhilitis vs colitis  GI pcr and C diff negative    2) Phlebitis- resolved  warm compresses   Blood cultures without growth    Overall, complete 10 d of antibiotics on 4/22  Can change to augmentin 875 mg po q 12 to finish the course
58 year old with NHL on rituximab presented 4/13 with abdominal pain and diarrhea    CT with ? colitis vs typhlitis  Also shows malignant lymphadenopathy      Abdominal pain present but improving     Associated diarrhea now improved         1) Abd pain  Imaging with typhilitis vs colitis  Check GI PCR - ordered   Follow  C diff - testing     Continue empiric zosyn    2) Phlebitis  warm compresses   Check blood cultures- sent on 4/13
63M, Sami-speaking, with hx of Non-Hodgkin's Lymphoma on Rituxan (last 02/2023), Hepatitis B (on entecavir), Campylobacter/EPEC gastroenteritis (11/2022), HLD, BPH who presents with loose stools x 3 day duration, found to have bandemia and colitis. 
63M, Belarusian-speaking, with hx of Non-Hodgkin's Lymphoma on Rituxan (last 02/2023), Hepatitis B (on entecavir), Campylobacter/EPEC gastroenteritis (11/2022), HLD, BPH who presents with loose stools x 3 day duration, found to have bandemia and colitis. 
63M, Frisian-speaking, with hx of Non-Hodgkin's Lymphoma on Rituxan (last 02/2023), Hepatitis B (on entecavir), Campylobacter/EPEC gastroenteritis (11/2022), HLD, BPH who presents with loose stools x 3 day duration, found to have bandemia and colitis.

## 2023-04-18 NOTE — CHART NOTE - NSCHARTNOTEFT_GEN_A_CORE
Of not, pt has high eosinophils.    This was noted last year as well.    I called his oncologist to discuss if this was thought to be associated with his lymphoma.   His oncologist stated this was likely reactive    Of note, he had high eosinophils in 2022.     I called the patient and advised that he follow up in our office for additional testing.   Pt daughter stated she will help make that appointment.

## 2023-09-19 ENCOUNTER — INPATIENT (INPATIENT)
Facility: HOSPITAL | Age: 63
LOS: 13 days | Discharge: HOME HEALTH | End: 2023-10-03
Attending: INTERNAL MEDICINE | Admitting: INTERNAL MEDICINE
Payer: MEDICARE

## 2023-09-19 VITALS
DIASTOLIC BLOOD PRESSURE: 68 MMHG | OXYGEN SATURATION: 97 % | TEMPERATURE: 98 F | HEART RATE: 96 BPM | SYSTOLIC BLOOD PRESSURE: 119 MMHG | RESPIRATION RATE: 18 BRPM

## 2023-09-19 DIAGNOSIS — Z90.49 ACQUIRED ABSENCE OF OTHER SPECIFIED PARTS OF DIGESTIVE TRACT: Chronic | ICD-10-CM

## 2023-09-19 LAB
ALBUMIN SERPL ELPH-MCNC: 4 G/DL — SIGNIFICANT CHANGE UP (ref 3.3–5)
ALP SERPL-CCNC: 143 U/L — HIGH (ref 40–120)
ALT FLD-CCNC: 50 U/L — HIGH (ref 4–41)
ANION GAP SERPL CALC-SCNC: 13 MMOL/L — SIGNIFICANT CHANGE UP (ref 7–14)
AST SERPL-CCNC: 31 U/L — SIGNIFICANT CHANGE UP (ref 4–40)
B PERT DNA SPEC QL NAA+PROBE: SIGNIFICANT CHANGE UP
B PERT+PARAPERT DNA PNL SPEC NAA+PROBE: SIGNIFICANT CHANGE UP
BASE EXCESS BLDV CALC-SCNC: 1 MMOL/L — SIGNIFICANT CHANGE UP (ref -2–3)
BASOPHILS # BLD AUTO: 0.13 K/UL — SIGNIFICANT CHANGE UP (ref 0–0.2)
BASOPHILS NFR BLD AUTO: 0.9 % — SIGNIFICANT CHANGE UP (ref 0–2)
BILIRUB SERPL-MCNC: 0.5 MG/DL — SIGNIFICANT CHANGE UP (ref 0.2–1.2)
BLOOD GAS VENOUS COMPREHENSIVE RESULT: SIGNIFICANT CHANGE UP
BORDETELLA PARAPERTUSSIS (RAPRVP): SIGNIFICANT CHANGE UP
BUN SERPL-MCNC: 12 MG/DL — SIGNIFICANT CHANGE UP (ref 7–23)
C PNEUM DNA SPEC QL NAA+PROBE: SIGNIFICANT CHANGE UP
CALCIUM SERPL-MCNC: 9.1 MG/DL — SIGNIFICANT CHANGE UP (ref 8.4–10.5)
CHLORIDE BLDV-SCNC: 102 MMOL/L — SIGNIFICANT CHANGE UP (ref 96–108)
CHLORIDE SERPL-SCNC: 100 MMOL/L — SIGNIFICANT CHANGE UP (ref 98–107)
CO2 BLDV-SCNC: 27.3 MMOL/L — HIGH (ref 22–26)
CO2 SERPL-SCNC: 25 MMOL/L — SIGNIFICANT CHANGE UP (ref 22–31)
CREAT SERPL-MCNC: 1.19 MG/DL — SIGNIFICANT CHANGE UP (ref 0.5–1.3)
EGFR: 68 ML/MIN/1.73M2 — SIGNIFICANT CHANGE UP
EOSINOPHIL # BLD AUTO: 9.03 K/UL — HIGH (ref 0–0.5)
EOSINOPHIL NFR BLD AUTO: 64.9 % — HIGH (ref 0–6)
FLUAV SUBTYP SPEC NAA+PROBE: SIGNIFICANT CHANGE UP
FLUBV RNA SPEC QL NAA+PROBE: SIGNIFICANT CHANGE UP
GAS PNL BLDV: 132 MMOL/L — LOW (ref 136–145)
GAS PNL BLDV: SIGNIFICANT CHANGE UP
GIANT PLATELETS BLD QL SMEAR: PRESENT — SIGNIFICANT CHANGE UP
GLUCOSE BLDV-MCNC: 106 MG/DL — HIGH (ref 70–99)
GLUCOSE SERPL-MCNC: 110 MG/DL — HIGH (ref 70–99)
HADV DNA SPEC QL NAA+PROBE: SIGNIFICANT CHANGE UP
HCO3 BLDV-SCNC: 26 MMOL/L — SIGNIFICANT CHANGE UP (ref 22–29)
HCOV 229E RNA SPEC QL NAA+PROBE: SIGNIFICANT CHANGE UP
HCOV HKU1 RNA SPEC QL NAA+PROBE: SIGNIFICANT CHANGE UP
HCOV NL63 RNA SPEC QL NAA+PROBE: SIGNIFICANT CHANGE UP
HCOV OC43 RNA SPEC QL NAA+PROBE: SIGNIFICANT CHANGE UP
HCT VFR BLD CALC: 40 % — SIGNIFICANT CHANGE UP (ref 39–50)
HCT VFR BLDA CALC: 40 % — SIGNIFICANT CHANGE UP (ref 39–51)
HGB BLD CALC-MCNC: 13.4 G/DL — SIGNIFICANT CHANGE UP (ref 12.6–17.4)
HGB BLD-MCNC: 13.2 G/DL — SIGNIFICANT CHANGE UP (ref 13–17)
HMPV RNA SPEC QL NAA+PROBE: SIGNIFICANT CHANGE UP
HPIV1 RNA SPEC QL NAA+PROBE: SIGNIFICANT CHANGE UP
HPIV2 RNA SPEC QL NAA+PROBE: SIGNIFICANT CHANGE UP
HPIV3 RNA SPEC QL NAA+PROBE: SIGNIFICANT CHANGE UP
HPIV4 RNA SPEC QL NAA+PROBE: SIGNIFICANT CHANGE UP
IANC: 2.7 K/UL — SIGNIFICANT CHANGE UP (ref 1.8–7.4)
LACTATE BLDV-MCNC: 1.1 MMOL/L — SIGNIFICANT CHANGE UP (ref 0.5–2)
LYMPHOCYTES # BLD AUTO: 0.74 K/UL — LOW (ref 1–3.3)
LYMPHOCYTES # BLD AUTO: 5.3 % — LOW (ref 13–44)
M PNEUMO DNA SPEC QL NAA+PROBE: SIGNIFICANT CHANGE UP
MANUAL SMEAR VERIFICATION: SIGNIFICANT CHANGE UP
MCHC RBC-ENTMCNC: 30.4 PG — SIGNIFICANT CHANGE UP (ref 27–34)
MCHC RBC-ENTMCNC: 33 GM/DL — SIGNIFICANT CHANGE UP (ref 32–36)
MCV RBC AUTO: 92.2 FL — SIGNIFICANT CHANGE UP (ref 80–100)
MONOCYTES # BLD AUTO: 0.49 K/UL — SIGNIFICANT CHANGE UP (ref 0–0.9)
MONOCYTES NFR BLD AUTO: 3.5 % — SIGNIFICANT CHANGE UP (ref 2–14)
NEUTROPHILS # BLD AUTO: 3.53 K/UL — SIGNIFICANT CHANGE UP (ref 1.8–7.4)
NEUTROPHILS NFR BLD AUTO: 25.4 % — LOW (ref 43–77)
PCO2 BLDV: 42 MMHG — SIGNIFICANT CHANGE UP (ref 42–55)
PH BLDV: 7.4 — SIGNIFICANT CHANGE UP (ref 7.32–7.43)
PLAT MORPH BLD: NORMAL — SIGNIFICANT CHANGE UP
PLATELET # BLD AUTO: 283 K/UL — SIGNIFICANT CHANGE UP (ref 150–400)
PLATELET COUNT - ESTIMATE: NORMAL — SIGNIFICANT CHANGE UP
PO2 BLDV: 55 MMHG — HIGH (ref 25–45)
POTASSIUM BLDV-SCNC: 4.2 MMOL/L — SIGNIFICANT CHANGE UP (ref 3.5–5.1)
POTASSIUM SERPL-MCNC: 3.7 MMOL/L — SIGNIFICANT CHANGE UP (ref 3.5–5.3)
POTASSIUM SERPL-SCNC: 3.7 MMOL/L — SIGNIFICANT CHANGE UP (ref 3.5–5.3)
PROT SERPL-MCNC: 7.1 G/DL — SIGNIFICANT CHANGE UP (ref 6–8.3)
RAPID RVP RESULT: DETECTED
RBC # BLD: 4.34 M/UL — SIGNIFICANT CHANGE UP (ref 4.2–5.8)
RBC # FLD: 13.5 % — SIGNIFICANT CHANGE UP (ref 10.3–14.5)
RBC BLD AUTO: NORMAL — SIGNIFICANT CHANGE UP
RSV RNA SPEC QL NAA+PROBE: SIGNIFICANT CHANGE UP
RV+EV RNA SPEC QL NAA+PROBE: DETECTED
SAO2 % BLDV: 84.5 % — SIGNIFICANT CHANGE UP (ref 67–88)
SARS-COV-2 RNA SPEC QL NAA+PROBE: SIGNIFICANT CHANGE UP
SODIUM SERPL-SCNC: 138 MMOL/L — SIGNIFICANT CHANGE UP (ref 135–145)
TROPONIN T, HIGH SENSITIVITY RESULT: 14 NG/L — SIGNIFICANT CHANGE UP
WBC # BLD: 13.91 K/UL — HIGH (ref 3.8–10.5)
WBC # FLD AUTO: 13.91 K/UL — HIGH (ref 3.8–10.5)

## 2023-09-19 PROCEDURE — 71046 X-RAY EXAM CHEST 2 VIEWS: CPT | Mod: 26

## 2023-09-19 PROCEDURE — 99285 EMERGENCY DEPT VISIT HI MDM: CPT | Mod: GC

## 2023-09-19 RX ORDER — ACETAMINOPHEN 500 MG
650 TABLET ORAL ONCE
Refills: 0 | Status: COMPLETED | OUTPATIENT
Start: 2023-09-19 | End: 2023-09-19

## 2023-09-19 RX ADMIN — Medication 650 MILLIGRAM(S): at 23:19

## 2023-09-19 RX ADMIN — Medication 650 MILLIGRAM(S): at 22:49

## 2023-09-19 NOTE — ED PROVIDER NOTE - AXIS
Normal
Hpi Title: Evaluation of Skin Lesions
How Severe Are Your Spot(S)?: mild
Have Your Spot(S) Been Treated In The Past?: has not been treated
Year Removed: 2000

## 2023-09-19 NOTE — ED PROVIDER NOTE - ATTENDING CONTRIBUTION TO CARE
Brief HPI:  63-year-old male past medical history of non-Hodgkin's lymphoma on rituximab presents with 1 month of cough, shortness of breath worsening in the last several days.  Patient with recent travel to [facility] but symptoms started prior to travel.  Patient reports fatigue.  Denies nausea, vomiting, chest pain, diarrhea, abdominal pain, rash.  No sick contacts.  Non-smoker.    Vitals:   Reviewed    Exam:    GEN:  Non-toxic appearing, non-distressed, speaking full sentences, non-diaphoretic, AAOx3  HEENT:  NCAT, neck supple, EOMI, PERRLA, sclera anicteric, no conjunctival pallor or injection, no stridor, normal voice, no tonsillar exudate, uvula midline  CV:  regular rhythm and rate, s1/s2 audible, no murmurs, rubs or gallops, peripheral pulses 2+ and symmetric  PULM:  non-labored respirations, Crackles in bilateral lung bases  ABD:  non distended, non-tender, no rebound, no guarding, negative Busby's sign, bowel sounds normal, no cvat  MSK:  no gross deformity, non-tender extremities and joints, range of motion grossly normal appearing, no extremity edema, extremities warm and well perfused   NEURO:  AAOx3, CN II-XII intact, motor 5/5 in upper and lower extremities bilaterally, sensation grossly intact in extremities and trunk, finger to nose testing wnl, no nystagmus, negative Romberg, no pronator drift, no gait deficit  SKIN:  warm, dry, no rash or vesicles     A/P:  63-year-old male past medical history of non-Hodgkin's lymphoma on rituximab presents with 1 month of cough, shortness of breath worsening in the last several days.  Low-grade temp.  Suspect viral syndrome, although patient with recent travel to [facility] which raises concern for tuberculosis although low concern overall.  We will send labs, RVP, chest x-ray, supportive care.  Disposition pending.

## 2023-09-19 NOTE — ED PROVIDER NOTE - OBJECTIVE STATEMENT
63-year-old male past medical history of non-Hodgkin's lymphoma on rituximab for chemotherapy presenting due to 1 month of productive cough, shortness of breath, body aches, and fever.  Patient endorses that the symptoms began before he left for [facility] and that he went to see a doctor in [facility] who prescribed him antibiotics with some relief but not complete relief.  Patient endorses that his symptoms worsened afterwards and upon return to the [place] acutely worsened.  Patient advocates green sputum production.  He also endorses episodes of nausea and vomiting with no blood in the vomit.  Patient denies congestion, weakness numbness to extremities, inability to ambulate, swelling the legs, history of asthma/COPD, history of cardiac events.

## 2023-09-19 NOTE — ED PROVIDER NOTE - NS ED ROS FT
GENERAL: fever   HEENT: No trouble swallowing or speaking  CARDIAC: chest pain  PULMONARY: Cough and SOB  GI: Nausea and vomiting, No abdominal pain, no diarrhea or constipation  : No changes in urination  SKIN: No rashes  NEURO: headache, no numbness  MSK: body aches  Otherwise as HPI or negative.

## 2023-09-19 NOTE — ED PROVIDER NOTE - PHYSICAL EXAMINATION
Tj Hemphill MD (PGY1)   Physical Exam:    Gen: NAD, AOx3, non-toxic appearing, able to ambulate without assistance  Head: NCAT  HEENT: EOMI, PEERLA, normal conjunctiva, tongue midline, oral mucosa moist  Lung: bibasilar crackles auscultated.   CV: RRR, no murmurs, rubs or gallops  MSK: no visible deformities, ROM normal in UE/LE, no back pain  Skin: Warm, well perfused, no rash, no leg swelling  Psych: normal affect, calm

## 2023-09-19 NOTE — ED ADULT NURSE NOTE - OBJECTIVE STATEMENT
MARCIE RN: 64 y/o M presents to ED room 20 A&Ox4 c/o cough and fever. information received from son at bedside, pt lethargic in bed at this time. endorses cough x 3 weeks, fever, weakness SOB x 1 week. pt traveled back from [facility] on 9/17. PMHx Lymphoma, last chemotherapy on  8/22. dry cough noted at this time. restorations even and unlabored. placed on continuous monitor, NSR noted. satting at 90% on RA, placed on 2.5 L NC. MD Tj Hemphill made aware. no acute distress noted. 20G to R forearm labs drawn and sent. awaiting results. safety maintained, side rails up

## 2023-09-19 NOTE — ED ADULT NURSE NOTE - CHIEF COMPLAINT QUOTE
pt on chemo for lymphoma. recently returned from [facility] c/o cough and SOB. last chemo last month.

## 2023-09-19 NOTE — ED PROVIDER NOTE - CLINICAL SUMMARY MEDICAL DECISION MAKING FREE TEXT BOX
63-year-old male past medical history of non-Hodgkin's lymphoma on rituximab presenting due to fevers, chills, body aches, cough, shortness of breath for 1 month.  Symptoms rebounded slightly to antibiotics 2 weeks ago but worsened since.  Patient has recent travel to [facility] but was vaccinated with BCG when he was younger.  Likely pneumonia versus viral illness.  Will order chest x-ray, CBC and respiratory viral panel to evaluate.  Due to patient's travel also concern for tuberculosis so will order interferons for tuberculosis.  Less likely cardiac etiology but cannot rule out, X so will order EKG and troponins.

## 2023-09-19 NOTE — ED ADULT NURSE NOTE - NSFALLRISKINTERV_ED_ALL_ED

## 2023-09-19 NOTE — ED ADULT TRIAGE NOTE - CHIEF COMPLAINT QUOTE
pt on chemo for lymphoma. recently returned from [facility] c/o cough and SOB. last chemo last month.

## 2023-09-20 DIAGNOSIS — Z29.9 ENCOUNTER FOR PROPHYLACTIC MEASURES, UNSPECIFIED: ICD-10-CM

## 2023-09-20 DIAGNOSIS — B34.1 ENTEROVIRUS INFECTION, UNSPECIFIED: ICD-10-CM

## 2023-09-20 DIAGNOSIS — Z79.899 OTHER LONG TERM (CURRENT) DRUG THERAPY: ICD-10-CM

## 2023-09-20 DIAGNOSIS — N40.0 BENIGN PROSTATIC HYPERPLASIA WITHOUT LOWER URINARY TRACT SYMPTOMS: ICD-10-CM

## 2023-09-20 DIAGNOSIS — J18.9 PNEUMONIA, UNSPECIFIED ORGANISM: ICD-10-CM

## 2023-09-20 DIAGNOSIS — B19.10 UNSPECIFIED VIRAL HEPATITIS B WITHOUT HEPATIC COMA: ICD-10-CM

## 2023-09-20 DIAGNOSIS — D72.10 EOSINOPHILIA, UNSPECIFIED: ICD-10-CM

## 2023-09-20 DIAGNOSIS — A41.9 SEPSIS, UNSPECIFIED ORGANISM: ICD-10-CM

## 2023-09-20 DIAGNOSIS — C61 MALIGNANT NEOPLASM OF PROSTATE: ICD-10-CM

## 2023-09-20 DIAGNOSIS — C82.90 FOLLICULAR LYMPHOMA, UNSPECIFIED, UNSPECIFIED SITE: ICD-10-CM

## 2023-09-20 LAB
ALBUMIN SERPL ELPH-MCNC: 3.2 G/DL — LOW (ref 3.3–5)
ALP SERPL-CCNC: 125 U/L — HIGH (ref 40–120)
ALT FLD-CCNC: 42 U/L — HIGH (ref 4–41)
ANION GAP SERPL CALC-SCNC: 10 MMOL/L — SIGNIFICANT CHANGE UP (ref 7–14)
AST SERPL-CCNC: 18 U/L — SIGNIFICANT CHANGE UP (ref 4–40)
BASOPHILS # BLD AUTO: 0.08 K/UL — SIGNIFICANT CHANGE UP (ref 0–0.2)
BASOPHILS NFR BLD AUTO: 0.6 % — SIGNIFICANT CHANGE UP (ref 0–2)
BILIRUB SERPL-MCNC: 0.5 MG/DL — SIGNIFICANT CHANGE UP (ref 0.2–1.2)
BUN SERPL-MCNC: 11 MG/DL — SIGNIFICANT CHANGE UP (ref 7–23)
CALCIUM SERPL-MCNC: 8.6 MG/DL — SIGNIFICANT CHANGE UP (ref 8.4–10.5)
CHLORIDE SERPL-SCNC: 106 MMOL/L — SIGNIFICANT CHANGE UP (ref 98–107)
CO2 SERPL-SCNC: 24 MMOL/L — SIGNIFICANT CHANGE UP (ref 22–31)
CREAT SERPL-MCNC: 1.13 MG/DL — SIGNIFICANT CHANGE UP (ref 0.5–1.3)
EGFR: 72 ML/MIN/1.73M2 — SIGNIFICANT CHANGE UP
EOSINOPHIL # BLD AUTO: 9.52 K/UL — HIGH (ref 0–0.5)
EOSINOPHIL NFR BLD AUTO: 65.9 % — HIGH (ref 0–6)
GLUCOSE SERPL-MCNC: 101 MG/DL — HIGH (ref 70–99)
HCT VFR BLD CALC: 40.8 % — SIGNIFICANT CHANGE UP (ref 39–50)
HGB BLD-MCNC: 13.2 G/DL — SIGNIFICANT CHANGE UP (ref 13–17)
IANC: 2.69 K/UL — SIGNIFICANT CHANGE UP (ref 1.8–7.4)
IMM GRANULOCYTES NFR BLD AUTO: 1.7 % — HIGH (ref 0–0.9)
LYMPHOCYTES # BLD AUTO: 0.93 K/UL — LOW (ref 1–3.3)
LYMPHOCYTES # BLD AUTO: 6.4 % — LOW (ref 13–44)
MCHC RBC-ENTMCNC: 30.2 PG — SIGNIFICANT CHANGE UP (ref 27–34)
MCHC RBC-ENTMCNC: 32.4 GM/DL — SIGNIFICANT CHANGE UP (ref 32–36)
MCV RBC AUTO: 93.4 FL — SIGNIFICANT CHANGE UP (ref 80–100)
MONOCYTES # BLD AUTO: 0.97 K/UL — HIGH (ref 0–0.9)
MONOCYTES NFR BLD AUTO: 6.7 % — SIGNIFICANT CHANGE UP (ref 2–14)
NEUTROPHILS # BLD AUTO: 2.69 K/UL — SIGNIFICANT CHANGE UP (ref 1.8–7.4)
NEUTROPHILS NFR BLD AUTO: 18.7 % — LOW (ref 43–77)
NRBC # BLD: 0 /100 WBCS — SIGNIFICANT CHANGE UP (ref 0–0)
NRBC # FLD: 0 K/UL — SIGNIFICANT CHANGE UP (ref 0–0)
PLATELET # BLD AUTO: 262 K/UL — SIGNIFICANT CHANGE UP (ref 150–400)
POTASSIUM SERPL-MCNC: 4.1 MMOL/L — SIGNIFICANT CHANGE UP (ref 3.5–5.3)
POTASSIUM SERPL-SCNC: 4.1 MMOL/L — SIGNIFICANT CHANGE UP (ref 3.5–5.3)
PROCALCITONIN SERPL-MCNC: 0.1 NG/ML — SIGNIFICANT CHANGE UP (ref 0.02–0.1)
PROT SERPL-MCNC: 6.2 G/DL — SIGNIFICANT CHANGE UP (ref 6–8.3)
RBC # BLD: 4.37 M/UL — SIGNIFICANT CHANGE UP (ref 4.2–5.8)
RBC # FLD: 13.7 % — SIGNIFICANT CHANGE UP (ref 10.3–14.5)
SODIUM SERPL-SCNC: 140 MMOL/L — SIGNIFICANT CHANGE UP (ref 135–145)
TROPONIN T, HIGH SENSITIVITY RESULT: 11 NG/L — SIGNIFICANT CHANGE UP
WBC # BLD: 14.44 K/UL — HIGH (ref 3.8–10.5)
WBC # FLD AUTO: 14.44 K/UL — HIGH (ref 3.8–10.5)

## 2023-09-20 PROCEDURE — 71250 CT THORAX DX C-: CPT | Mod: 26

## 2023-09-20 PROCEDURE — 99223 1ST HOSP IP/OBS HIGH 75: CPT | Mod: GC

## 2023-09-20 PROCEDURE — 99223 1ST HOSP IP/OBS HIGH 75: CPT

## 2023-09-20 RX ORDER — OXYBUTYNIN CHLORIDE 5 MG
1 TABLET ORAL
Refills: 0 | DISCHARGE

## 2023-09-20 RX ORDER — TAMSULOSIN HYDROCHLORIDE 0.4 MG/1
1 CAPSULE ORAL
Refills: 0 | DISCHARGE

## 2023-09-20 RX ORDER — IPRATROPIUM/ALBUTEROL SULFATE 18-103MCG
3 AEROSOL WITH ADAPTER (GRAM) INHALATION EVERY 6 HOURS
Refills: 0 | Status: DISCONTINUED | OUTPATIENT
Start: 2023-09-20 | End: 2023-10-03

## 2023-09-20 RX ORDER — TAMSULOSIN HYDROCHLORIDE 0.4 MG/1
0.4 CAPSULE ORAL AT BEDTIME
Refills: 0 | Status: DISCONTINUED | OUTPATIENT
Start: 2023-09-20 | End: 2023-10-03

## 2023-09-20 RX ORDER — SODIUM CHLORIDE 9 MG/ML
1000 INJECTION INTRAMUSCULAR; INTRAVENOUS; SUBCUTANEOUS
Refills: 0 | Status: ACTIVE | OUTPATIENT
Start: 2023-09-20 | End: 2023-09-21

## 2023-09-20 RX ORDER — ENTECAVIR 0.5 MG/1
1 TABLET ORAL
Refills: 0 | DISCHARGE

## 2023-09-20 RX ORDER — ACYCLOVIR SODIUM 500 MG
1 VIAL (EA) INTRAVENOUS
Refills: 0 | DISCHARGE

## 2023-09-20 RX ORDER — LANOLIN ALCOHOL/MO/W.PET/CERES
6 CREAM (GRAM) TOPICAL ONCE
Refills: 0 | Status: COMPLETED | OUTPATIENT
Start: 2023-09-20 | End: 2023-09-20

## 2023-09-20 RX ORDER — PIPERACILLIN AND TAZOBACTAM 4; .5 G/20ML; G/20ML
3.38 INJECTION, POWDER, LYOPHILIZED, FOR SOLUTION INTRAVENOUS ONCE
Refills: 0 | Status: COMPLETED | OUTPATIENT
Start: 2023-09-20 | End: 2023-09-20

## 2023-09-20 RX ORDER — SODIUM CHLORIDE 9 MG/ML
4 INJECTION INTRAMUSCULAR; INTRAVENOUS; SUBCUTANEOUS EVERY 8 HOURS
Refills: 0 | Status: DISCONTINUED | OUTPATIENT
Start: 2023-09-20 | End: 2023-09-22

## 2023-09-20 RX ORDER — ENOXAPARIN SODIUM 100 MG/ML
40 INJECTION SUBCUTANEOUS EVERY 24 HOURS
Refills: 0 | Status: DISCONTINUED | OUTPATIENT
Start: 2023-09-20 | End: 2023-10-03

## 2023-09-20 RX ORDER — GEMFIBROZIL 600 MG
1 TABLET ORAL
Refills: 0 | DISCHARGE

## 2023-09-20 RX ORDER — PIPERACILLIN AND TAZOBACTAM 4; .5 G/20ML; G/20ML
3.38 INJECTION, POWDER, LYOPHILIZED, FOR SOLUTION INTRAVENOUS EVERY 8 HOURS
Refills: 0 | Status: COMPLETED | OUTPATIENT
Start: 2023-09-20 | End: 2023-09-27

## 2023-09-20 RX ORDER — ASPIRIN/CALCIUM CARB/MAGNESIUM 324 MG
1 TABLET ORAL
Refills: 0 | DISCHARGE

## 2023-09-20 RX ORDER — BUDESONIDE AND FORMOTEROL FUMARATE DIHYDRATE 160; 4.5 UG/1; UG/1
2 AEROSOL RESPIRATORY (INHALATION)
Refills: 0 | DISCHARGE

## 2023-09-20 RX ADMIN — TAMSULOSIN HYDROCHLORIDE 0.4 MILLIGRAM(S): 0.4 CAPSULE ORAL at 21:29

## 2023-09-20 RX ADMIN — SODIUM CHLORIDE 100 MILLILITER(S): 9 INJECTION INTRAMUSCULAR; INTRAVENOUS; SUBCUTANEOUS at 08:26

## 2023-09-20 RX ADMIN — PIPERACILLIN AND TAZOBACTAM 200 GRAM(S): 4; .5 INJECTION, POWDER, LYOPHILIZED, FOR SOLUTION INTRAVENOUS at 12:45

## 2023-09-20 RX ADMIN — Medication 6 MILLIGRAM(S): at 22:55

## 2023-09-20 RX ADMIN — SODIUM CHLORIDE 4 MILLILITER(S): 9 INJECTION INTRAMUSCULAR; INTRAVENOUS; SUBCUTANEOUS at 23:35

## 2023-09-20 RX ADMIN — SODIUM CHLORIDE 100 MILLILITER(S): 9 INJECTION INTRAMUSCULAR; INTRAVENOUS; SUBCUTANEOUS at 22:55

## 2023-09-20 RX ADMIN — PIPERACILLIN AND TAZOBACTAM 25 GRAM(S): 4; .5 INJECTION, POWDER, LYOPHILIZED, FOR SOLUTION INTRAVENOUS at 23:36

## 2023-09-20 RX ADMIN — ENOXAPARIN SODIUM 40 MILLIGRAM(S): 100 INJECTION SUBCUTANEOUS at 21:29

## 2023-09-20 RX ADMIN — PIPERACILLIN AND TAZOBACTAM 25 GRAM(S): 4; .5 INJECTION, POWDER, LYOPHILIZED, FOR SOLUTION INTRAVENOUS at 18:55

## 2023-09-20 RX ADMIN — Medication 3 MILLILITER(S): at 03:17

## 2023-09-20 NOTE — H&P ADULT - NSHPREVIEWOFSYSTEMS_GEN_ALL_CORE
Review of Systems:   CONSTITUTIONAL:  fever,  fatigue  EYES: No eye pain, visual disturbances, or discharge  ENMT:  No difficulty hearing, tinnitus, vertigo; No sinus or throat pain  NECK: No pain or stiffness  RESPIRATORY:  cough,  shortness of breath  CARDIOVASCULAR: No chest pain, palpitations, dizziness, or leg swelling  GASTROINTESTINAL: No abdominal or epigastric pain.+ nausea, vomiting, loose stool  GENITOURINARY: No dysuria, frequency, hematuria, or incontinence  NEUROLOGICAL: No current headache  SKIN: No itching, burning, rashes, or lesions   MUSCULOSKELETAL: No joint pain or swelling; No muscle, back, or extremity pain

## 2023-09-20 NOTE — PATIENT PROFILE ADULT - FALL HARM RISK - RISK INTERVENTIONS

## 2023-09-20 NOTE — ED ADULT NURSE REASSESSMENT NOTE - NS ED NURSE REASSESS COMMENT FT1
Pt with no acute changes. Pt denies chest pain, HA, SOB, dizziness, blurred vision, N/V/D. Respirations even and unlabored, chest rise and fall equal b/l. Safety maintained
Pt with no acute changes. Pt denies chest pain, HA, SOB, dizziness, blurred vision, N/V/D. Respirations even and unlabored, chest rise and fall equal b/l. Safety maintained.
Break RN note- Patient resting quietly in bed, breathing  even and nonlabored on 2L nasal cannula. Patient appears comfortable. Safety maintained. Patient stable upon exiting the room.

## 2023-09-20 NOTE — H&P ADULT - NSHPPHYSICALEXAM_GEN_ALL_CORE
PHYSICAL EXAM:      Constitutional: NAD, well-groomed, well-developed  HEENT: PERRLA, EOMI, Normal Hearing  Neck: No LAD, No JVD  Back: Normal spine flexure, No CVA tenderness  Respiratory: CTAB, but poor inspiratory eff on exam  Cardiovascular: S1 and S2, RRR  Gastrointestinal: BS+, soft, NT/ND  Extremities: No peripheral edema  Vascular: 2+ peripheral pulses  Neurological: A/O x 3, no focal deficits  Psychiatric: Normal mood, normal affect  Musculoskeletal: 4-5/5 strength b/l upper and lower extremities  Skin: No rashes

## 2023-09-20 NOTE — CONSULT NOTE ADULT - SUBJECTIVE AND OBJECTIVE BOX
HPI:    Lopez Jade is a 63 y.o male with a history of non-Hodgkin lymphoma on rituximab (last dose 8/22/2023) presenting with 1 month of productive cough, SOB, myalgias, vomiting, and diarrhea.  ID is being consulted for c/f PNA and to r/o TB.  The cough and SOB developed approximately one month ago subsequent to rituximab therapy but prior to a trip to [facility].  He has not experienced rhinorrhea or fevers, but has not measured his temperature (febrile to 100.7 in ED).  While in [facility] he visited a doctor and completed a 7 day course of fimoxyclav.  He was also prescribed inhalers as well.  His symptoms did not improve with antibiotics and worsened upon returning to the [place].  Although he has been experiencing weight loss over the past several months, it has worsened over the past month due to poor PO intake 2/2 n/v.  His other symptoms have also been present for longer than a month, but have perhaps worsened.  Upon arrival to the ED, his CXR demonstrated clear lungs with a RVP positive for Rhinovirus.    Pt also complains of dry, itchy eyes with "clouding" of his right eye and some self-reported difficulty in moving his right eye.           REVIEW OF SYSTEMS  As per HPI      prior hospital charts reviewed [V]  primary team notes reviewed [V]  other consultant notes reviewed [V]    PAST MEDICAL & SURGICAL HISTORY:  Non Hodgkin's lymphoma      Hepatitis B      History of cholecystectomy          SOCIAL HISTORY:  - Denied smoking/vaping/alcohol/recreational drug use    FAMILY HISTORY:  FH: lymphoma (Mother)        Allergies  No Known Allergies        ANTIMICROBIALS:      ANTIMICROBIALS (past 90 days):  MEDICATIONS  (STANDING):        OTHER MEDS:   MEDICATIONS  (STANDING):  albuterol/ipratropium for Nebulization 3 every 6 hours PRN  enoxaparin Injectable 40 every 24 hours  sodium chloride 3%  Inhalation 4 every 8 hours  tamsulosin 0.4 at bedtime      VITALS:  Vital Signs Last 24 Hrs  T(F): 98 (09-20-23 @ 05:36), Max: 100.7 (09-19-23 @ 19:43)    Vital Signs Last 24 Hrs  HR: 80 (09-20-23 @ 05:36) (73 - 96)  BP: 110/81 (09-20-23 @ 05:36) (110/59 - 119/68)  RR: 17 (09-20-23 @ 05:36)  SpO2: 100% (09-20-23 @ 05:36) (95% - 100%)  Wt(kg): --    EXAM:      PHYSICAL EXAM:  GENERAL: NAD, well-groomed, well-developed  HEAD:  Atraumatic, Normocephalic  EYES: EOM grossly intact, PERRL, conjunctiva and sclera clear, arcus senilis mild conjunctival injection  NECK: No JVD  NERVOUS SYSTEM: AOX3, motor grossly intact in b/l UE and b/l LE  PSYCHIATRIC: Appropriate affect and mood  CHEST/LUNG: Diffuse crackles  HEART: Regular rate and rhythm; No murmurs, rubs, or gallops. No LE edema  ABDOMEN: Soft, Nontender, Nondistended; Bowel sounds present  EXTREMITIES:  2+ Peripheral Pulses, No clubbing, cyanosis  SKIN: No rashes or lesions      Labs:                        13.2   14.44 )-----------( 262      ( 20 Sep 2023 08:00 )             40.8     09-20    140  |  106  |  11  ----------------------------<  101<H>  4.1   |  24  |  1.13    Ca    8.6      20 Sep 2023 08:00    TPro  6.2  /  Alb  3.2<L>  /  TBili  0.5  /  DBili  x   /  AST  18  /  ALT  42<H>  /  AlkPhos  125<H>  09-20      WBC Trend:  WBC Count: 14.44 (09-20-23 @ 08:00)  WBC Count: 13.91 (09-19-23 @ 21:48)      Auto Neutrophil #: 2.69 K/uL (09-20-23 @ 08:00)  Auto Neutrophil #: 3.53 K/uL (09-19-23 @ 21:48)  Auto Neutrophil #: 2.23 K/uL (04-15-23 @ 06:55)  Auto Neutrophil #: 3.53 K/uL (04-13-23 @ 23:11)  Band Neutrophils %: 7.0 % (04-13-23 @ 23:11)      Creatine Trend:  Creatinine: 1.13 (09-20)  Creatinine: 1.19 (09-19)      Liver Biochemical Testing Trend:  Alanine Aminotransferase (ALT/SGPT): 42 *H* (09-20)  Alanine Aminotransferase (ALT/SGPT): 50 *H* (09-19)  Alanine Aminotransferase (ALT/SGPT): 14 (04-13)  Alanine Aminotransferase (ALT/SGPT): 33 (10-29)  Alanine Aminotransferase (ALT/SGPT): 24 (10-28)  Aspartate Aminotransferase (AST/SGOT): 18 (09-20-23 @ 08:00)  Aspartate Aminotransferase (AST/SGOT): 31 (09-19-23 @ 21:48)  Aspartate Aminotransferase (AST/SGOT): 28 (04-13-23 @ 23:11)  Aspartate Aminotransferase (AST/SGOT): 22 (10-29-22 @ 05:02)  Aspartate Aminotransferase (AST/SGOT): 18 (10-28-22 @ 00:55)  Bilirubin Total: 0.5 (09-20)  Bilirubin Total: 0.5 (09-19)  Bilirubin Total, Serum: 0.6 (04-13)  Bilirubin Total, Serum: 0.8 (10-29)  Bilirubin Total, Serum: 0.5 (10-28)      Trend LDH      Auto Eosinophil %: 65.9 % (09-20-23 @ 08:00)  Auto Eosinophil %: 64.9 % (09-19-23 @ 21:48)      Urinalysis Basic - ( 20 Sep 2023 08:00 )    Color: x / Appearance: x / SG: x / pH: x  Gluc: 101 mg/dL / Ketone: x  / Bili: x / Urobili: x   Blood: x / Protein: x / Nitrite: x   Leuk Esterase: x / RBC: x / WBC x   Sq Epi: x / Non Sq Epi: x / Bacteria: x        MICROBIOLOGY:    MRSA PCR Result.: NotDetec (04-14-23 @ 21:00)      Culture - Blood (collected 13 Apr 2023 23:15)  Source: .Blood Blood-Peripheral  Final Report:    No Growth Final    Culture - Blood (collected 13 Apr 2023 23:00)  Source: .Blood Blood-Peripheral  Final Report:    No Growth Final    Culture - Blood (collected 29 Oct 2022 05:02)  Source: .Blood Blood-Peripheral  Final Report:    No Growth Final    Culture - Blood (collected 29 Oct 2022 04:15)  Source: .Blood Blood-Venous  Final Report:    No Growth Final    Culture - Stool (collected 28 Oct 2022 23:13)  Source: .Stool Feces  Final Report:    Moderate Campylobacter jejuni    (Stool culture examined for Salmonella,    Shigella, Campylobacter, Aeromonas, Plesiomonas,    Vibrio, E.coli O157 and Yersinia)                        Rapid RVP Result: Detected (09-19 @ 21:48)          Procalcitonin, Serum: 0.10 (09-20)              Troponin T, High Sensitivity Result: 11 (09-19)  Troponin T, High Sensitivity Result: 14 (09-19)    Blood Gas Venous - Lactate: 1.1 (09-19 @ 21:48)        RADIOLOGY:  imaging below personally reviewed       HPI:    Lopez Jade is a 63 y.o male with a history of non-Hodgkin lymphoma on rituximab (last dose 8/22/2023) presenting with 1 month of productive cough, SOB, myalgias, vomiting, and diarrhea.  ID is being consulted for c/f PNA and to r/o TB.  The cough and SOB developed approximately one month ago subsequent to rituximab therapy but prior to a trip to [facility].  He has not experienced rhinorrhea or fevers, but has not measured his temperature (febrile to 100.7 in ED).  While in [facility] he visited a doctor and completed a 7 day course of fimoxyclav.  He was also prescribed inhalers as well.  His symptoms did not improve with antibiotics and worsened upon returning to the [place].  Although he has been experiencing weight loss over the past several months, it has worsened over the past month due to poor PO intake 2/2 n/v.  His other symptoms have also been present for longer than a month, but have perhaps worsened.  Upon arrival to the ED, his CXR demonstrated clear lungs with an RVP positive for Rhinovirus.    Pt also complains of dry, itchy eyes with some blurred vision, with "clouding" of his right eye and some self-reported difficulty in moving his right eye.  Also complains of some dysuria as well.       REVIEW OF SYSTEMS  As per HPI      prior hospital charts reviewed [V]  primary team notes reviewed [V]  other consultant notes reviewed [V]    PAST MEDICAL & SURGICAL HISTORY:  Non Hodgkin's lymphoma      Hepatitis B      History of cholecystectomy          SOCIAL HISTORY:  - Denied smoking/vaping/alcohol/recreational drug use    FAMILY HISTORY:  FH: lymphoma (Mother)        Allergies  No Known Allergies        ANTIMICROBIALS:      ANTIMICROBIALS (past 90 days):  MEDICATIONS  (STANDING):        OTHER MEDS:   MEDICATIONS  (STANDING):  albuterol/ipratropium for Nebulization 3 every 6 hours PRN  enoxaparin Injectable 40 every 24 hours  sodium chloride 3%  Inhalation 4 every 8 hours  tamsulosin 0.4 at bedtime      VITALS:  Vital Signs Last 24 Hrs  T(F): 98 (09-20-23 @ 05:36), Max: 100.7 (09-19-23 @ 19:43)    Vital Signs Last 24 Hrs  HR: 80 (09-20-23 @ 05:36) (73 - 96)  BP: 110/81 (09-20-23 @ 05:36) (110/59 - 119/68)  RR: 17 (09-20-23 @ 05:36)  SpO2: 100% (09-20-23 @ 05:36) (95% - 100%)  Wt(kg): --    EXAM:      PHYSICAL EXAM:  GENERAL: NAD, well-groomed, well-developed  HEAD:  Atraumatic, Normocephalic  EYES: EOM grossly intact, PERRL, conjunctiva and sclera clear, arcus senilis, mild conjunctival injection  NECK: No JVD  NERVOUS SYSTEM: AOX3, motor grossly intact in b/l UE and b/l LE  PSYCHIATRIC: Appropriate affect and mood  CHEST/LUNG: Diffuse crackles  HEART: Regular rate and rhythm; No murmurs, rubs, or gallops. No LE edema  ABDOMEN: Soft, Nontender, Nondistended; Bowel sounds present  EXTREMITIES:  2+ Peripheral Pulses, No clubbing, cyanosis  SKIN: No rashes or lesions      Labs:                        13.2   14.44 )-----------( 262      ( 20 Sep 2023 08:00 )             40.8     09-20    140  |  106  |  11  ----------------------------<  101<H>  4.1   |  24  |  1.13    Ca    8.6      20 Sep 2023 08:00    TPro  6.2  /  Alb  3.2<L>  /  TBili  0.5  /  DBili  x   /  AST  18  /  ALT  42<H>  /  AlkPhos  125<H>  09-20      WBC Trend:  WBC Count: 14.44 (09-20-23 @ 08:00)  WBC Count: 13.91 (09-19-23 @ 21:48)      Auto Neutrophil #: 2.69 K/uL (09-20-23 @ 08:00)  Auto Neutrophil #: 3.53 K/uL (09-19-23 @ 21:48)  Auto Neutrophil #: 2.23 K/uL (04-15-23 @ 06:55)  Auto Neutrophil #: 3.53 K/uL (04-13-23 @ 23:11)  Band Neutrophils %: 7.0 % (04-13-23 @ 23:11)      Creatine Trend:  Creatinine: 1.13 (09-20)  Creatinine: 1.19 (09-19)      Liver Biochemical Testing Trend:  Alanine Aminotransferase (ALT/SGPT): 42 *H* (09-20)  Alanine Aminotransferase (ALT/SGPT): 50 *H* (09-19)  Alanine Aminotransferase (ALT/SGPT): 14 (04-13)  Alanine Aminotransferase (ALT/SGPT): 33 (10-29)  Alanine Aminotransferase (ALT/SGPT): 24 (10-28)  Aspartate Aminotransferase (AST/SGOT): 18 (09-20-23 @ 08:00)  Aspartate Aminotransferase (AST/SGOT): 31 (09-19-23 @ 21:48)  Aspartate Aminotransferase (AST/SGOT): 28 (04-13-23 @ 23:11)  Aspartate Aminotransferase (AST/SGOT): 22 (10-29-22 @ 05:02)  Aspartate Aminotransferase (AST/SGOT): 18 (10-28-22 @ 00:55)  Bilirubin Total: 0.5 (09-20)  Bilirubin Total: 0.5 (09-19)  Bilirubin Total, Serum: 0.6 (04-13)  Bilirubin Total, Serum: 0.8 (10-29)  Bilirubin Total, Serum: 0.5 (10-28)      Trend LDH      Auto Eosinophil %: 65.9 % (09-20-23 @ 08:00)  Auto Eosinophil %: 64.9 % (09-19-23 @ 21:48)      Urinalysis Basic - ( 20 Sep 2023 08:00 )    Color: x / Appearance: x / SG: x / pH: x  Gluc: 101 mg/dL / Ketone: x  / Bili: x / Urobili: x   Blood: x / Protein: x / Nitrite: x   Leuk Esterase: x / RBC: x / WBC x   Sq Epi: x / Non Sq Epi: x / Bacteria: x        MICROBIOLOGY:    MRSA PCR Result.: NotDetec (04-14-23 @ 21:00)      Culture - Blood (collected 13 Apr 2023 23:15)  Source: .Blood Blood-Peripheral  Final Report:    No Growth Final    Culture - Blood (collected 13 Apr 2023 23:00)  Source: .Blood Blood-Peripheral  Final Report:    No Growth Final    Culture - Blood (collected 29 Oct 2022 05:02)  Source: .Blood Blood-Peripheral  Final Report:    No Growth Final    Culture - Blood (collected 29 Oct 2022 04:15)  Source: .Blood Blood-Venous  Final Report:    No Growth Final    Culture - Stool (collected 28 Oct 2022 23:13)  Source: .Stool Feces  Final Report:    Moderate Campylobacter jejuni    (Stool culture examined for Salmonella,    Shigella, Campylobacter, Aeromonas, Plesiomonas,    Vibrio, E.coli O157 and Yersinia)                        Rapid RVP Result: Detected (09-19 @ 21:48)          Procalcitonin, Serum: 0.10 (09-20)              Troponin T, High Sensitivity Result: 11 (09-19)  Troponin T, High Sensitivity Result: 14 (09-19)    Blood Gas Venous - Lactate: 1.1 (09-19 @ 21:48)        RADIOLOGY:  imaging below personally reviewed       HPI:    Lopez Jade is a 63 y.o male with a history of non-Hodgkin lymphoma on rituximab (last dose 8/22/2023) presenting with 1 month of productive cough, SOB, myalgias, vomiting, and diarrhea.  ID is being consulted for c/f PNA and to r/o TB.  The cough and SOB developed approximately one month ago subsequent to rituximab therapy but prior to a trip to [facility].  He has not experienced rhinorrhea or fevers, but has not measured his temperature (febrile to 100.7 in ED).  While in [facility] he visited a doctor and completed a 7 day course of fimoxyclav.  He was also prescribed inhalers as well.  His symptoms did not improve with antibiotics and worsened upon returning to the [place].  Although he has been experiencing weight loss over the past several months, it has worsened over the past month due to poor PO intake 2/2 n/v.  His other symptoms have also been present for longer than a month, but have perhaps worsened.  Upon arrival to the ED, his CXR demonstrated clear lungs with an RVP positive for Rhinovirus.    Pt also complains of dry, itchy eyes with some blurred vision, with "clouding" of his right eye and some self-reported difficulty in moving his right eye.  Also complains of some dysuria as well.       REVIEW OF SYSTEMS    All other systems negative    Constitutional: + fever, + chills, +loss of appetite  Head: no trauma  Eyes: no vision changes, no eye pain  ENT:  no sore throat, no rhinorrhea  Cardiovascular:  no chest pain, no palpitation  Respiratory:  no SOB, no cough  GI:  intermittent abd pain, vomiting and diarrhea  urinary: no dysuria, no hematuria, no flank pain  musculoskeletal:  no joint pain, no joint swelling  skin:  no rash  neurology:  no headache, no seizure, no change in mental status  psych: no anxiety, no depression           prior hospital charts reviewed [V]  primary team notes reviewed [V]  other consultant notes reviewed [V]    PAST MEDICAL & SURGICAL HISTORY:  Non Hodgkin's lymphoma      Hepatitis B      History of cholecystectomy          SOCIAL HISTORY:  from [facility],  lives with family  Denied smoking/vaping/alcohol/recreational drug use  recent trip to [facility]    FAMILY HISTORY:  FH: lymphoma (Mother)        Allergies  No Known Allergies        ANTIMICROBIALS:      ANTIMICROBIALS (past 90 days):  MEDICATIONS  (STANDING):        OTHER MEDS:   MEDICATIONS  (STANDING):  albuterol/ipratropium for Nebulization 3 every 6 hours PRN  enoxaparin Injectable 40 every 24 hours  sodium chloride 3%  Inhalation 4 every 8 hours  tamsulosin 0.4 at bedtime      VITALS:  Vital Signs Last 24 Hrs  T(F): 98 (09-20-23 @ 05:36), Max: 100.7 (09-19-23 @ 19:43)    Vital Signs Last 24 Hrs  HR: 80 (09-20-23 @ 05:36) (73 - 96)  BP: 110/81 (09-20-23 @ 05:36) (110/59 - 119/68)  RR: 17 (09-20-23 @ 05:36)  SpO2: 100% (09-20-23 @ 05:36) (95% - 100%)  Wt(kg): --    EXAM:      PHYSICAL EXAM:  GENERAL: NAD, well-groomed, well-developed  HEAD:  Atraumatic, Normocephalic  EYES: EOM grossly intact, PERRL, conjunctiva and sclera clear, arcus senilis, mild conjunctival injection  NECK: No JVD  NERVOUS SYSTEM: AOX3, motor grossly intact in b/l UE and b/l LE  PSYCHIATRIC: Appropriate affect and mood  CHEST/LUNG: Diffuse crackles  HEART: Regular rate and rhythm; No murmurs, rubs, or gallops. No LE edema  ABDOMEN: Soft, Nontender, Nondistended; Bowel sounds present  : no suprapubic or CVA tenderness  EXTREMITIES:  2+ Peripheral Pulses, No clubbing, cyanosis  SKIN: No rashes or lesions  vascular: no phlebitis       Labs:                        13.2   14.44 )-----------( 262      ( 20 Sep 2023 08:00 )             40.8     09-20    140  |  106  |  11  ----------------------------<  101<H>  4.1   |  24  |  1.13    Ca    8.6      20 Sep 2023 08:00    TPro  6.2  /  Alb  3.2<L>  /  TBili  0.5  /  DBili  x   /  AST  18  /  ALT  42<H>  /  AlkPhos  125<H>  09-20      WBC Trend:  WBC Count: 14.44 (09-20-23 @ 08:00)  WBC Count: 13.91 (09-19-23 @ 21:48)      Auto Neutrophil #: 2.69 K/uL (09-20-23 @ 08:00)  Auto Neutrophil #: 3.53 K/uL (09-19-23 @ 21:48)  Auto Neutrophil #: 2.23 K/uL (04-15-23 @ 06:55)  Auto Neutrophil #: 3.53 K/uL (04-13-23 @ 23:11)  Band Neutrophils %: 7.0 % (04-13-23 @ 23:11)      Creatine Trend:  Creatinine: 1.13 (09-20)  Creatinine: 1.19 (09-19)      Liver Biochemical Testing Trend:  Alanine Aminotransferase (ALT/SGPT): 42 *H* (09-20)  Alanine Aminotransferase (ALT/SGPT): 50 *H* (09-19)  Alanine Aminotransferase (ALT/SGPT): 14 (04-13)  Alanine Aminotransferase (ALT/SGPT): 33 (10-29)  Alanine Aminotransferase (ALT/SGPT): 24 (10-28)  Aspartate Aminotransferase (AST/SGOT): 18 (09-20-23 @ 08:00)  Aspartate Aminotransferase (AST/SGOT): 31 (09-19-23 @ 21:48)  Aspartate Aminotransferase (AST/SGOT): 28 (04-13-23 @ 23:11)  Aspartate Aminotransferase (AST/SGOT): 22 (10-29-22 @ 05:02)  Aspartate Aminotransferase (AST/SGOT): 18 (10-28-22 @ 00:55)  Bilirubin Total: 0.5 (09-20)  Bilirubin Total: 0.5 (09-19)  Bilirubin Total, Serum: 0.6 (04-13)  Bilirubin Total, Serum: 0.8 (10-29)  Bilirubin Total, Serum: 0.5 (10-28)      Trend LDH      Auto Eosinophil %: 65.9 % (09-20-23 @ 08:00)  Auto Eosinophil %: 64.9 % (09-19-23 @ 21:48)      Urinalysis Basic - ( 20 Sep 2023 08:00 )    Color: x / Appearance: x / SG: x / pH: x  Gluc: 101 mg/dL / Ketone: x  / Bili: x / Urobili: x   Blood: x / Protein: x / Nitrite: x   Leuk Esterase: x / RBC: x / WBC x   Sq Epi: x / Non Sq Epi: x / Bacteria: x        MICROBIOLOGY:    MRSA PCR Result.: NotDetec (04-14-23 @ 21:00)      Culture - Blood (collected 13 Apr 2023 23:15)  Source: .Blood Blood-Peripheral  Final Report:    No Growth Final    Culture - Blood (collected 13 Apr 2023 23:00)  Source: .Blood Blood-Peripheral  Final Report:    No Growth Final    Culture - Blood (collected 29 Oct 2022 05:02)  Source: .Blood Blood-Peripheral  Final Report:    No Growth Final    Culture - Blood (collected 29 Oct 2022 04:15)  Source: .Blood Blood-Venous  Final Report:    No Growth Final    Culture - Stool (collected 28 Oct 2022 23:13)  Source: .Stool Feces  Final Report:    Moderate Campylobacter jejuni    (Stool culture examined for Salmonella,    Shigella, Campylobacter, Aeromonas, Plesiomonas,    Vibrio, E.coli O157 and Yersinia)                        Rapid RVP Result: Detected (09-19 @ 21:48)          Procalcitonin, Serum: 0.10 (09-20)              Troponin T, High Sensitivity Result: 11 (09-19)  Troponin T, High Sensitivity Result: 14 (09-19)    Blood Gas Venous - Lactate: 1.1 (09-19 @ 21:48)        RADIOLOGY:  imaging below personally reviewed      < from: Xray Chest 2 Views PA/Lat (09.19.23 @ 21:32) >    IMPRESSION:    Clear lungs.    < end of copied text >

## 2023-09-20 NOTE — H&P ADULT - PROBLEM SELECTOR PLAN 2
-2/2 lung infection possibly  stemming from rhinovirus infection; however, superimposed bacterial infection including TB given country of origin and rituxan use  -hold abx for now, supportive care with IVF, prn nebs, and chest pt; if cough prevents sleep , would place on prn hycodan   -chest ct, procalcitonin, sputum and gold QuantiFeron ordered

## 2023-09-20 NOTE — CONSULT NOTE ADULT - ASSESSMENT
Lopez Jade is a 63 y.o male with a history of non-Hodgkin lymphoma on rituximab (last dose 8/22/2023) presenting with 1 month of productive cough, SOB, myalgias, vomiting, and diarrhea.  ID is being consulted for c/f PNA and to r/o TB.

## 2023-09-20 NOTE — CONSULT NOTE ADULT - SUBJECTIVE AND OBJECTIVE BOX
HEMATOLOGY ONCOLOGY CONSULT     Patient is a 63y old  Male who presents with a chief complaint of resp infection (20 Sep 2023 09:57)      HPI:  63M, Welsh-speaking, with hx of Non-Hodgkin's Lymphoma on Rituxan , Hepatitis B on entecavir (now?) , Campylobacter/EPEC gastroenteritis (11/2022), HLD, BPH who presents with 1 month of cough and constitutional symptoms. Symptoms began before going to [facility] improved there with abx, but did not completely resolve. Now complaining of productive cough, chest pain when coughing, nausea, vomiting, loose stool, anorexia, weakness, insomnia for cough. CXR prelim clear. + Rhinovirus on rvp. Pt unsure of prior TB screenings, but was started on rituxan within last year per son (last given Aug 22)  T max here 100.7 (O), wbc 13.9 (20 Sep 2023 01:00)       ROS:  Negative except for:    PAST MEDICAL & SURGICAL HISTORY:  Non Hodgkin's lymphoma      Hepatitis B      History of cholecystectomy          SOCIAL HISTORY:    FAMILY HISTORY:  FH: lymphoma (Mother)        MEDICATIONS  (STANDING):  enoxaparin Injectable 40 milliGRAM(s) SubCutaneous every 24 hours  sodium chloride 0.9%. 1000 milliLiter(s) (100 mL/Hr) IV Continuous <Continuous>  sodium chloride 3%  Inhalation 4 milliLiter(s) Inhalation every 8 hours  tamsulosin 0.4 milliGRAM(s) Oral at bedtime    MEDICATIONS  (PRN):  albuterol/ipratropium for Nebulization 3 milliLiter(s) Nebulizer every 6 hours PRN Bronchospasm      Allergies    No Known Allergies    Intolerances        Vital Signs Last 24 Hrs  T(C): 36.7 (20 Sep 2023 05:36), Max: 38.2 (19 Sep 2023 19:43)  T(F): 98 (20 Sep 2023 05:36), Max: 100.7 (19 Sep 2023 19:43)  HR: 80 (20 Sep 2023 05:36) (73 - 96)  BP: 110/81 (20 Sep 2023 05:36) (110/59 - 119/68)  BP(mean): --  RR: 17 (20 Sep 2023 05:36) (16 - 18)  SpO2: 100% (20 Sep 2023 05:36) (95% - 100%)    Parameters below as of 20 Sep 2023 05:36  Patient On (Oxygen Delivery Method): nasal cannula  O2 Flow (L/min): 2      PHYSICAL EXAM  General: adult in NAD  HEENT: clear oropharynx, anicteric sclera, pink conjunctiva  Neck: supple  CV: normal S1/S2 with no murmur rubs or gallops  Lungs: positive air movement b/l ant lungs,clear to auscultation, no wheezes, no rales  Abdomen: soft non-tender non-distended, no hepatosplenomegaly  Ext: no clubbing cyanosis or edema  Skin: no rashes and no petechiae  Neuro: alert and oriented X 4, no focal deficits      LABS:                          13.2   14.44 )-----------( 262      ( 20 Sep 2023 08:00 )             40.8         Mean Cell Volume : 93.4 fL  Mean Cell Hemoglobin : 30.2 pg  Mean Cell Hemoglobin Concentration : 32.4 gm/dL  Auto Neutrophil # : 2.69 K/uL  Auto Lymphocyte # : 0.93 K/uL  Auto Monocyte # : 0.97 K/uL  Auto Eosinophil # : 9.52 K/uL  Auto Basophil # : 0.08 K/uL  Auto Neutrophil % : 18.7 %  Auto Lymphocyte % : 6.4 %  Auto Monocyte % : 6.7 %  Auto Eosinophil % : 65.9 %  Auto Basophil % : 0.6 %      09-20    140  |  106  |  11  ----------------------------<  101<H>  4.1   |  24  |  1.13    Ca    8.6      20 Sep 2023 08:00    TPro  6.2  /  Alb  3.2<L>  /  TBili  0.5  /  DBili  x   /  AST  18  /  ALT  42<H>  /  AlkPhos  125<H>  09-20                      BLOOD SMEAR INTERPRETATION:       RADIOLOGY & ADDITIONAL STUDIES:

## 2023-09-20 NOTE — H&P ADULT - PROBLEM SELECTOR PLAN 6
son and pt unsure of current meds; will order bph meds based on april discharge summary but defer full med rec to pharmacy whom I've emailed

## 2023-09-20 NOTE — PHARMACOTHERAPY INTERVENTION NOTE - COMMENTS
Medication list in Outpatient Medication Review ([facility]) has been updated accordingly; list/dosages provided by outpatient pharmacy ([facility]).

## 2023-09-20 NOTE — H&P ADULT - NSHPLABSRESULTS_GEN_ALL_CORE
13.2   13.91 )-----------( 283      ( 19 Sep 2023 21:48 )             40.0     09-19    138  |  100  |  12  ----------------------------<  110<H>  3.7   |  25  |  1.19    Ca    9.1      19 Sep 2023 21:48    TPro  7.1  /  Alb  4.0  /  TBili  0.5  /  DBili  x   /  AST  31  /  ALT  50<H>  /  AlkPhos  143<H>  09-19    CAPILLARY BLOOD GLUCOSE          Urinalysis Basic - ( 19 Sep 2023 21:48 )    Color: x / Appearance: x / SG: x / pH: x  Gluc: 110 mg/dL / Ketone: x  / Bili: x / Urobili: x   Blood: x / Protein: x / Nitrite: x   Leuk Esterase: x / RBC: x / WBC x   Sq Epi: x / Non Sq Epi: x / Bacteria: x      Vital Signs Last 24 Hrs  T(C): 36.6 (20 Sep 2023 01:07), Max: 38.2 (19 Sep 2023 19:43)  T(F): 97.8 (20 Sep 2023 01:07), Max: 100.7 (19 Sep 2023 19:43)  HR: 73 (20 Sep 2023 01:07) (73 - 96)  BP: 112/64 (20 Sep 2023 01:07) (110/59 - 119/68)  BP(mean): --  RR: 16 (20 Sep 2023 01:07) (16 - 18)  SpO2: 100% (20 Sep 2023 01:07) (95% - 100%)    Parameters below as of 20 Sep 2023 01:07  Patient On (Oxygen Delivery Method): nasal cannula  O2 Flow (L/min): 2

## 2023-09-20 NOTE — H&P ADULT - PROBLEM SELECTOR PLAN 1
-possible stemming from rhinovirus infection; however, superimposed bacterial infection including TB given country of origin and rituxan use  -hold abx for now, supportive care with IVF, prn nebs, and chest pt; if cough prevents sleep , would place on prn hycodan   -chest ct, procalcitonin, sputum and gold QuantiFeron ordered -possible stemming from rhinovirus infection; however, superimposed bacterial infection including TB given country of origin and rituxan use  -hold abx for now, supportive care with IVF, prn nebs, and chest pt; if cough prevents sleep , would place on prn hycodan   -chest ct, procalcitonin, sputum and gold QuantiFeron ordered  -elevated eosinophil may reflect known lymphoma, but would consider helminth or fugal infection as part of DD; would obtain ID eval -possible stemming from rhinovirus infection; however, superimposed bacterial infection including TB given country of origin and rituxan use  -hold abx for now, supportive care with IVF, prn nebs, and chest pt; if cough prevents sleep , would place on prn hycodan   -chest ct, procalcitonin, sputum and gold QuantiFeron ordered  -elevated eosinophil may reflect known lymphoma, but would consider helminth or fugal infection as part of DD; would obtain ID eval  -given ALL can present with hypereosinophilia, will order blood smear,; Hem-Onc emailed

## 2023-09-20 NOTE — CONSULT NOTE ADULT - ASSESSMENT
#Eosinophilia  - Check Vitamin B12, tryptase, IgE, troponin, BNP, carie, anca,  - Please send flow cytometry  - Please get high-resolution CT chest  - Appreciate ID input. Agree with parasitic workup.    #DLBCL  - Please obtain records from Oncologist's office    Incomplete note. Full note to follow.    Paige Branham M.D.  Hematology and Medical Oncology Fellow  Pager: [phone]  For weekends and evenings (5 pm - 8 am), please page Heme/Onc fellow on call. 66 yo male with Lymphoma (on Rituximab, patient unclear about treatment history) admitted for an upper respiratory infection, found to have a peripheral eosinophilia. Hematology consulted for eosinophilia.    #Eosinophilia  Per chart review, pt has had a peristent eosinophilia count since 2021.   - Check Vitamin B12, tryptase, IgE, troponin, BNP, carie, anca,  - Please send flow cytometry  - Please get high-resolution CT chest  - Appreciate ID input. Agree with parasitic workup.    #Lymphoma  - Please obtain records from Oncologist's office. Unclear baseline for eosinophilia. Differential would include lymphoid mailgnacies.    Attending recomendations to follow.    Paige Branham M.D.  Hematology and Medical Oncology Fellow  Pager: [phone]  For weekends and evenings (5 pm - 8 am), please page Heme/Onc fellow on call. 66 yo male with Lymphoma (on Rituximab, patient unclear about treatment history) admitted for an upper respiratory infection, found to have a peripheral eosinophilia. Hematology consulted for eosinophilia.    #Eosinophilia  Per chart review, pt has had a persistent eosinophilia count since 2021 (rangin 2K-3K), but has never been worked up at his Hematologist's office. Differential includes allergic disorders, infectious (especially parasites), neoplasms, and Hypereosinophilic syndromes.  - Check Vitamin B12, tryptase, IgE, troponin, BNP, ISAIAH  - Please send flow cytometry  - Please get high-resolution CT chest  - Appreciate ID input. Agree with parasitic workup. Please also check HIV and Hepatitis    #Follicular Lymphoma  Pt follows with Dr. Alondra Mcgill ([phone]). Collateral history obtained from Dr. Mcgill's office. Pt was diagnosed with Follicular Lymphoma in early 2022. He was treated with 6 cycles of Rituximab-Bendamustine (March 2022 - August 2022) and transitioned to maintainence Rituximab every 8 weeks (last Dose 8/5/23)  - Please obtain CT Chest/Abdomen/Pelvis for staging scans. Patient had last scans in January 2023 and was due for repeat surveillance scans in July 2023.  - Outpatient follow up with Dr. Mcgill.    Attending recommendations to follow.    Paige Branham M.D.  Hematology and Medical Oncology Fellow  Pager: [phone]  For weekends and evenings (5 pm - 8 am), please page Heme/Onc fellow on call. 68 yo male with HBV (on entecavir) and hx of Follicular Lymphoma in remission (treated with 6 cycles of Rituximab-Bendamustine, now on maintenance Ritxumab) admitted for an upper respiratory infection, found to have a peripheral eosinophilia. Hematology consulted for eosinophilia.    #Eosinophilia  Per chart review, pt has had a persistent eosinophilia count since 2021 (rangin 2K-3K), but has never been worked up at his Hematologist's office. Differential includes allergic disorders, infectious (especially parasites), neoplasms, and Hypereosinophilic syndromes.  - Check Vitamin B12, tryptase, IgE, troponin, BNP, ISAIAH  - Please send flow cytometry  - Please get high-resolution CT chest  - Appreciate ID input. Agree with parasitic workup. Please also check HIV and Hepatitis (particularly HBV viral load)    #Follicular Lymphoma  Pt follows with Dr. Alondra Mcgill ([phone]). Collateral history obtained from Dr. Mcgill's office. Pt was diagnosed with Follicular Lymphoma in early 2022. He was treated with 6 cycles of Rituximab-Bendamustine (March 2022 - August 2022) and transitioned to maintainence Rituximab every 8 weeks (last Dose 8/5/23)  - Please obtain CT Chest/Abdomen/Pelvis for staging scans. Patient had last scans in January 2023 and was due for repeat surveillance scans in July 2023.  - Outpatient follow up with Dr. Mcgill.    Attending recommendations to follow.    Paige Branham M.D.  Hematology and Medical Oncology Fellow  Pager: [phone]  For weekends and evenings (5 pm - 8 am), please page Heme/Onc fellow on call. 66 yo male with HBV (on entecavir) and hx of Follicular Lymphoma in remission (treated with 6 cycles of Rituximab-Bendamustine, now on maintenance Ritxumab) admitted for an upper respiratory infection, found to have a peripheral eosinophilia. Hematology consulted for eosinophilia.    #Eosinophilia  Per chart review, pt has had a persistent eosinophilia count since 2021 (ranging 2K-3K), but has never been worked up at his Hematologist's office. Differential includes allergic disorders, infectious (especially parasites), neoplasms, and Hypereosinophilic syndromes.  - Check Vitamin B12, tryptase, IgE, troponin, BNP, ISAIAH  - Please send flow cytometry  - Please get high-resolution CT chest  - Appreciate ID input. Agree with parasitic workup. Please also check HIV and Hepatitis (particularly HBV viral load)    #Follicular Lymphoma  Pt follows with Dr. Alondra Mcgill ([phone]). Collateral history obtained from Dr. Mcgill's office. Pt was diagnosed with Follicular Lymphoma in early 2022. He was treated with 6 cycles of Rituximab-Bendamustine (March 2022 - August 2022) and transitioned to maintenance Rituximab every 8 weeks (last Dose 8/5/23)  - Please obtain CT Chest/Abdomen/Pelvis for staging scans. Patient had last scans in January 2023 and was due for repeat surveillance scans in July 2023.  - Outpatient follow up with Dr. Mcgill.    Attending recommendations to follow.    Paige Branham M.D.  Hematology and Medical Oncology Fellow  Pager: [phone]  For weekends and evenings (5 pm - 8 am), please page Heme/Onc fellow on call.

## 2023-09-20 NOTE — CONSULT NOTE ADULT - ATTENDING COMMENTS
63 m with non hodgkin's lymphoma on rituxan last 8/28, has had cough, SOB which started a month ago and got worse from dry cough to productive, also has no appetite with intermittent diarrhea, vomiting and abd pain, he just came back from a16 day trip to [facility] but his symptoms started before the trip and was given antibiotics in [facility], initially felt better but then again worsened   here febrile to 100.7  WBC: 14, eso: 9.52  RVP: entero/rhino  CXR clear    immunocompromised pt with non hodgkin's lymphoma on ritixan with a month of cough and SOB, progressively worsening, also just came back from   [facility] but symptoms started  before, here with fever and eosinophilia which was present before but now 9000  entero/rhino pneumonia vs eosinophilic pneumonia  the whole picture is not s/o TB but will r/o as pt is from endemic area and a month of symptoms    * f/u the blood cx  * chest CT  * sputum AFB x 3 q 8, one should be early morning  * sputum cx  * zosyn for now  * eosinophilia w/u, check strongyloides, toxocara, filaria, coccidioides Ab, histo Ag  * f/u the quantiferon but pt is already immunocompromised so will likely be indeterminate   * f/u with hem/onc and pulm eval  * monitor CBc/diff and CMP    The above assessment and plan was discussed with the primary team    Malou Cortes MD  contact on teams  After 5pm and on weekends call [phone]

## 2023-09-20 NOTE — CONSULT NOTE ADULT - PROBLEM SELECTOR RECOMMENDATION 2
Eosinophils 9.52K, appears elevated since April 2023.  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  DDx is broad and includes parasitic etiologies, Tony's syndrome, adrenal insufficiency, medications, and malignancy.  Current medication regimen appears unlikely to contribute, although possibly related to rituximab?  No known exposure to animals, no recent outdoor activities.    Recommendations:  - Strongyloides labs  - Toxocara labs  - Filaria labs  - Schistosoma labs  - Trichinella labs  - Stool ova and parasites  - Morning cortisol  - Urinalysis with reflex to culture  - Appreciate Heme/Onc consult

## 2023-09-20 NOTE — PATIENT PROFILE ADULT - FLU SEASON?
The skin of the right groin and right arm was clipped, prepped and draped in the usual sterile manner. (If not otherwise specified, skin prep was bilateral.)  Yes...

## 2023-09-20 NOTE — PROGRESS NOTE ADULT - SUBJECTIVE AND OBJECTIVE BOX
Patient is a 63y old  Male who presents with a chief complaint of resp infection (20 Sep 2023 10:29)    Sinan Cormier MD   [facility] Division of Hospital Medicine   Pager [phone]  Reachable on Microsoft Teams     SUBJECTIVE / OVERNIGHT EVENTS:  Patient seen and examined this morning.   Recently came back from [facility] 4 days ago after 3 week trip.  Had cough prior to trip, worsened abroad, received ABX in [facility] with modest improvement   Cough has persisted and had been with green productive sputum.  Last chemo he reports was in August, next is in Oct per patient.       MEDICATIONS  (STANDING):  enoxaparin Injectable 40 milliGRAM(s) SubCutaneous every 24 hours  piperacillin/tazobactam IVPB.- 3.375 Gram(s) IV Intermittent once  piperacillin/tazobactam IVPB.. 3.375 Gram(s) IV Intermittent every 8 hours  sodium chloride 0.9%. 1000 milliLiter(s) (100 mL/Hr) IV Continuous <Continuous>  sodium chloride 3%  Inhalation 4 milliLiter(s) Inhalation every 8 hours  tamsulosin 0.4 milliGRAM(s) Oral at bedtime    MEDICATIONS  (PRN):  albuterol/ipratropium for Nebulization 3 milliLiter(s) Nebulizer every 6 hours PRN Bronchospasm      Vital Signs Last 24 Hrs  T(C): 37.6 (20 Sep 2023 12:48), Max: 38.2 (19 Sep 2023 19:43)  T(F): 99.6 (20 Sep 2023 12:48), Max: 100.7 (19 Sep 2023 19:43)  HR: 99 (20 Sep 2023 12:48) (73 - 101)  BP: 119/72 (20 Sep 2023 12:48) (102/60 - 119/72)  BP(mean): --  RR: 25 (20 Sep 2023 12:48) (16 - 25)  SpO2: 98% (20 Sep 2023 12:48) (95% - 100%)  CAPILLARY BLOOD GLUCOSE        I&O's Summary      General: man laying down in bed appears comfortable in NAD, awake and alert  HENMT: MMM   Respiratory: No respiratory distress, CTABL, No rales, rhonchi, wheezing.  Cardiovascular: S1,S2; No m/g/r.  Gastrointestinal: Soft, Nontender, Nondistended; +BS.   Extremities: No c/c/e; warm to touch  Neurological: Moving all 4 extremities; Sensation to LT grossly in tact.  Skin: No rashes, No erythema   Psych: appropriate mood and affect    LABS:                        13.2   14.44 )-----------( 262      ( 20 Sep 2023 08:00 )             40.8     09-20    140  |  106  |  11  ----------------------------<  101<H>  4.1   |  24  |  1.13    Ca    8.6      20 Sep 2023 08:00    TPro  6.2  /  Alb  3.2<L>  /  TBili  0.5  /  DBili  x   /  AST  18  /  ALT  42<H>  /  AlkPhos  125<H>  09-20          Urinalysis Basic - ( 20 Sep 2023 08:00 )    Color: x / Appearance: x / SG: x / pH: x  Gluc: 101 mg/dL / Ketone: x  / Bili: x / Urobili: x   Blood: x / Protein: x / Nitrite: x   Leuk Esterase: x / RBC: x / WBC x   Sq Epi: x / Non Sq Epi: x / Bacteria: x        RADIOLOGY & ADDITIONAL TESTS:    Imaging Personally Reviewed:    Consultant(s) Notes Reviewed:      Care Discussed with Consultants/Other Providers:

## 2023-09-20 NOTE — H&P ADULT - HISTORY OF PRESENT ILLNESS
pt unsure of prior TB screenings, but was started on rituxan 6 months ago 63M, Kazakh-speaking, with hx of Non-Hodgkin's Lymphoma on Rituxan , Hepatitis B on entecavir (now?) , Campylobacter/EPEC gastroenteritis (11/2022), HLD, BPH who presents with 1 month of cough and constitutional symptoms. Symptoms began before going to [facility], imptoved there with abx, but did not completel resolve. Now comlaining of productive cough, chest pain when coughing, nausdea, vomiting, loose stool, anorexia, weakness, insomnia for cough. CXR   pt unsure of prior TB screenings, but was started on rituxan within last year per son (last given Aug 22) 63M, Maori-speaking, with hx of Non-Hodgkin's Lymphoma on Rituxan , Hepatitis B on entecavir (now?) , Campylobacter/EPEC gastroenteritis (11/2022), HLD, BPH who presents with 1 month of cough and constitutional symptoms. Symptoms began before going to [facility] improved there with abx, but did not completely resolve. Now complaining of productive cough, chest pain when coughing, nausea, vomiting, loose stool, anorexia, weakness, insomnia for cough. CXR prelim clear. + Rhinovirus on rvp. Pt unsure of prior TB screenings, but was started on rituxan within last year per son (last given Aug 22)  T max here 100.7 (O), wbc 13.9

## 2023-09-21 DIAGNOSIS — R91.8 OTHER NONSPECIFIC ABNORMAL FINDING OF LUNG FIELD: ICD-10-CM

## 2023-09-21 LAB
ALBUMIN SERPL ELPH-MCNC: 2.9 G/DL — LOW (ref 3.3–5)
ALP SERPL-CCNC: 148 U/L — HIGH (ref 40–120)
ALT FLD-CCNC: 37 U/L — SIGNIFICANT CHANGE UP (ref 4–41)
ANION GAP SERPL CALC-SCNC: 13 MMOL/L — SIGNIFICANT CHANGE UP (ref 7–14)
AST SERPL-CCNC: 17 U/L — SIGNIFICANT CHANGE UP (ref 4–40)
BASOPHILS # BLD AUTO: 0.06 K/UL — SIGNIFICANT CHANGE UP (ref 0–0.2)
BASOPHILS NFR BLD AUTO: 0.4 % — SIGNIFICANT CHANGE UP (ref 0–2)
BILIRUB SERPL-MCNC: 0.4 MG/DL — SIGNIFICANT CHANGE UP (ref 0.2–1.2)
BUN SERPL-MCNC: 8 MG/DL — SIGNIFICANT CHANGE UP (ref 7–23)
CALCIUM SERPL-MCNC: 8.1 MG/DL — LOW (ref 8.4–10.5)
CHLORIDE SERPL-SCNC: 104 MMOL/L — SIGNIFICANT CHANGE UP (ref 98–107)
CO2 SERPL-SCNC: 24 MMOL/L — SIGNIFICANT CHANGE UP (ref 22–31)
CREAT SERPL-MCNC: 1.21 MG/DL — SIGNIFICANT CHANGE UP (ref 0.5–1.3)
EGFR: 66 ML/MIN/1.73M2 — SIGNIFICANT CHANGE UP
EOSINOPHIL # BLD AUTO: 10.16 K/UL — HIGH (ref 0–0.5)
EOSINOPHIL NFR BLD AUTO: 70.5 % — HIGH (ref 0–6)
GAMMA INTERFERON BACKGROUND BLD IA-ACNC: 0.03 IU/ML — SIGNIFICANT CHANGE UP
GLUCOSE SERPL-MCNC: 89 MG/DL — SIGNIFICANT CHANGE UP (ref 70–99)
GRAM STN FLD: SIGNIFICANT CHANGE UP
HAV IGM SER-ACNC: SIGNIFICANT CHANGE UP
HBV CORE IGM SER-ACNC: SIGNIFICANT CHANGE UP
HBV SURFACE AG SER-ACNC: SIGNIFICANT CHANGE UP
HCT VFR BLD CALC: 36.3 % — LOW (ref 39–50)
HCV AB S/CO SERPL IA: 0.03 S/CO — SIGNIFICANT CHANGE UP (ref 0–0.99)
HCV AB SERPL-IMP: SIGNIFICANT CHANGE UP
HGB BLD-MCNC: 12 G/DL — LOW (ref 13–17)
HIV 1+2 AB+HIV1 P24 AG SERPL QL IA: SIGNIFICANT CHANGE UP
IANC: 2.14 K/UL — SIGNIFICANT CHANGE UP (ref 1.8–7.4)
IMM GRANULOCYTES NFR BLD AUTO: 0.8 % — SIGNIFICANT CHANGE UP (ref 0–0.9)
LYMPHOCYTES # BLD AUTO: 0.83 K/UL — LOW (ref 1–3.3)
LYMPHOCYTES # BLD AUTO: 5.8 % — LOW (ref 13–44)
M TB IFN-G BLD-IMP: NEGATIVE — SIGNIFICANT CHANGE UP
M TB IFN-G CD4+ BCKGRND COR BLD-ACNC: 0 IU/ML — SIGNIFICANT CHANGE UP
M TB IFN-G CD4+CD8+ BCKGRND COR BLD-ACNC: 0.02 IU/ML — SIGNIFICANT CHANGE UP
MCHC RBC-ENTMCNC: 30.7 PG — SIGNIFICANT CHANGE UP (ref 27–34)
MCHC RBC-ENTMCNC: 33.1 GM/DL — SIGNIFICANT CHANGE UP (ref 32–36)
MCV RBC AUTO: 92.8 FL — SIGNIFICANT CHANGE UP (ref 80–100)
MONOCYTES # BLD AUTO: 1.11 K/UL — HIGH (ref 0–0.9)
MONOCYTES NFR BLD AUTO: 7.7 % — SIGNIFICANT CHANGE UP (ref 2–14)
NEUTROPHILS # BLD AUTO: 2.14 K/UL — SIGNIFICANT CHANGE UP (ref 1.8–7.4)
NEUTROPHILS NFR BLD AUTO: 14.8 % — LOW (ref 43–77)
NIGHT BLUE STAIN TISS: SIGNIFICANT CHANGE UP
NIGHT BLUE STAIN TISS: SIGNIFICANT CHANGE UP
NRBC # BLD: 0 /100 WBCS — SIGNIFICANT CHANGE UP (ref 0–0)
NRBC # FLD: 0 K/UL — SIGNIFICANT CHANGE UP (ref 0–0)
PLATELET # BLD AUTO: 266 K/UL — SIGNIFICANT CHANGE UP (ref 150–400)
POTASSIUM SERPL-MCNC: 3.5 MMOL/L — SIGNIFICANT CHANGE UP (ref 3.5–5.3)
POTASSIUM SERPL-SCNC: 3.5 MMOL/L — SIGNIFICANT CHANGE UP (ref 3.5–5.3)
PROT SERPL-MCNC: 5.8 G/DL — LOW (ref 6–8.3)
QUANT TB PLUS MITOGEN MINUS NIL: 8.58 IU/ML — SIGNIFICANT CHANGE UP
RBC # BLD: 3.91 M/UL — LOW (ref 4.2–5.8)
RBC # FLD: 13.4 % — SIGNIFICANT CHANGE UP (ref 10.3–14.5)
SODIUM SERPL-SCNC: 141 MMOL/L — SIGNIFICANT CHANGE UP (ref 135–145)
SPECIMEN SOURCE: SIGNIFICANT CHANGE UP
VIT B12 SERPL-MCNC: 1979 PG/ML — HIGH (ref 200–900)
WBC # BLD: 14.01 K/UL — HIGH (ref 3.8–10.5)
WBC # FLD AUTO: 14.01 K/UL — HIGH (ref 3.8–10.5)

## 2023-09-21 PROCEDURE — 88189 FLOWCYTOMETRY/READ 16 & >: CPT

## 2023-09-21 PROCEDURE — 99232 SBSQ HOSP IP/OBS MODERATE 35: CPT

## 2023-09-21 PROCEDURE — 99223 1ST HOSP IP/OBS HIGH 75: CPT | Mod: GC

## 2023-09-21 PROCEDURE — 99232 SBSQ HOSP IP/OBS MODERATE 35: CPT | Mod: GC

## 2023-09-21 RX ORDER — LANOLIN ALCOHOL/MO/W.PET/CERES
3 CREAM (GRAM) TOPICAL AT BEDTIME
Refills: 0 | Status: DISCONTINUED | OUTPATIENT
Start: 2023-09-21 | End: 2023-10-03

## 2023-09-21 RX ORDER — ENTECAVIR 0.5 MG/1
0.5 TABLET ORAL DAILY
Refills: 0 | Status: DISCONTINUED | OUTPATIENT
Start: 2023-09-21 | End: 2023-10-03

## 2023-09-21 RX ORDER — ACETAMINOPHEN 500 MG
650 TABLET ORAL EVERY 6 HOURS
Refills: 0 | Status: DISCONTINUED | OUTPATIENT
Start: 2023-09-21 | End: 2023-10-03

## 2023-09-21 RX ORDER — ACYCLOVIR SODIUM 500 MG
400 VIAL (EA) INTRAVENOUS
Refills: 0 | Status: DISCONTINUED | OUTPATIENT
Start: 2023-09-21 | End: 2023-10-03

## 2023-09-21 RX ADMIN — Medication 650 MILLIGRAM(S): at 23:39

## 2023-09-21 RX ADMIN — Medication 400 MILLIGRAM(S): at 16:21

## 2023-09-21 RX ADMIN — ENTECAVIR 0.5 MILLIGRAM(S): 0.5 TABLET ORAL at 22:41

## 2023-09-21 RX ADMIN — TAMSULOSIN HYDROCHLORIDE 0.4 MILLIGRAM(S): 0.4 CAPSULE ORAL at 22:05

## 2023-09-21 RX ADMIN — SODIUM CHLORIDE 4 MILLILITER(S): 9 INJECTION INTRAMUSCULAR; INTRAVENOUS; SUBCUTANEOUS at 11:47

## 2023-09-21 RX ADMIN — PIPERACILLIN AND TAZOBACTAM 25 GRAM(S): 4; .5 INJECTION, POWDER, LYOPHILIZED, FOR SOLUTION INTRAVENOUS at 22:04

## 2023-09-21 RX ADMIN — PIPERACILLIN AND TAZOBACTAM 25 GRAM(S): 4; .5 INJECTION, POWDER, LYOPHILIZED, FOR SOLUTION INTRAVENOUS at 06:04

## 2023-09-21 RX ADMIN — SODIUM CHLORIDE 4 MILLILITER(S): 9 INJECTION INTRAMUSCULAR; INTRAVENOUS; SUBCUTANEOUS at 19:55

## 2023-09-21 RX ADMIN — ENOXAPARIN SODIUM 40 MILLIGRAM(S): 100 INJECTION SUBCUTANEOUS at 22:05

## 2023-09-21 RX ADMIN — Medication 3 MILLIGRAM(S): at 22:41

## 2023-09-21 NOTE — DISCHARGE NOTE PROVIDER - PROVIDER TOKENS
FREE:[LAST:[Alejandro],FIRST:[Yousef],PHONE:[phone]],FAX:[(   )    -],FOLLOWUP:[2 weeks]] FREE:[LAST:[Alejandro],FIRST:[Yousef],PHONE:[phone]],FAX:[(   )    -],FOLLOWUP:[2 weeks]],PROVIDER:[TOKEN:[[id number]],FOLLOWUP:[1 month]]

## 2023-09-21 NOTE — DISCHARGE NOTE PROVIDER - NSDCFUADDAPPT_GEN_ALL_CORE_FT
APPTS ARE READY TO BE MADE: [X ] YES    Best Family or Patient Contact (if needed):    Additional Information about above appointments (if needed):    1: PCP, patient   2:   3:     Other comments or requests:    You are scheduled for a virtual appointment with Dr. Kline on 10/6 at 11:30am. Follow-up care will be established at the time of the virtual appointment.    Telehealth instructions: At the time of your appointment, you will receive a text/email invite for your telehealth session. Please click on the link enter the patient's name. You will then be redirected to a virtual waiting room, where you will wait until the doctor has connected with you.      Please follow-up with your PCP within 1-2 weeks of discharge  You are scheduled for a virtual appointment with Dr. Kline on 10/6 at 11:30am. Follow-up care will be established at the time of the virtual appointment.    Telehealth instructions: At the time of your appointment, you will receive a text/email invite for your telehealth session. Please click on the link enter the patient's name. You will then be redirected to a virtual waiting room, where you will wait until the doctor has connected with you.     Please follow-up with Dr. Mcgill your oncologist for further management     Please follow-up with your PCP within 1-2 weeks of discharge  You are scheduled for a virtual appointment with Dr. Kline on 10/6 at 11:30am. Follow-up care will be established at the time of the virtual appointment.    Telehealth instructions: At the time of your appointment, you will receive a text/email invite for your telehealth session. Please click on the link enter the patient's name. You will then be redirected to a virtual waiting room, where you will wait until the doctor has connected with you.     Please follow-up with Dr. Mcgill your oncologist for further management     Please follow-up with your PCP within 1-2 weeks of discharge     Patient/Caregiver was provided with follow up request details and prefers to call the providers office on their own to schedule.

## 2023-09-21 NOTE — DISCHARGE NOTE PROVIDER - CARE PROVIDER_API CALL
Alejandro, Yousef  Phone: [phone]  Fax: (   )    -  Follow Up Time: 2 weeks   Mena Allen  Phone: [phone]  Fax: (   )    -  Follow Up Time: 2 weeks    TOO VALDEZ, DARREN BANKS  [id number]  [place] [address]  Phone: [phone]  Fax: [phone]  Follow Up Time: 1 month

## 2023-09-21 NOTE — CONSULT NOTE ADULT - ASSESSMENT
68 yo male with HBV (on entecavir) and hx of Follicular Lymphoma in remission (treated with 6 cycles of Rituximab-Bendamustine, now on maintenance Ritxumab) admitted for an upper respiratory infection, found to have a peripheral eosinophilia. Hematology consulted for eosinophilia.    #Eosinophilia  Per chart review, pt has had a persistent eosinophilia count since 2021 (ranging 2K-3K), but has never been worked up at his Hematologist's office. Differential includes allergic disorders, infectious (especially parasites), neoplasms, and Hypereosinophilic syndromes.  - B12 wnl. Check tryptase, IgE, troponin, BNP, ISAIAH  - Please send flow cytometry  - Appreciate ID input. Agree with parasitic workup. Please also check Hepatitis (particularly HBV viral load)    #Follicular Lymphoma  Pt follows with Dr. Alondra Mcgill ([phone]). Collateral history obtained from Dr. Mcgill's office. Pt was diagnosed with Follicular Lymphoma in early 2022. He was treated with 6 cycles of Rituximab-Bendamustine (March 2022 - August 2022) and transitioned to maintenance Rituximab every 8 weeks (last Dose 8/5/23)  - Please obtain CT Chest/Abdomen/Pelvis for staging scans. Patient had last scans in January 2023 and was due for repeat surveillance scans in July 2023.  - Outpatient follow up with Dr. Mcgill.    Rosendo d/w Dr. Moss.    Paige Branham M.D.  Hematology and Medical Oncology Fellow  Pager: [phone]  For weekends and evenings (5 pm - 8 am), please page Heme/Onc fellow on call.

## 2023-09-21 NOTE — DISCHARGE NOTE PROVIDER - COLLABORATE WITH
This patient is critically ill due to the following:  * Multiple organ failure requiring complex decision-making, and there is a high probability of imminent or life-threatening deterioration in the patient’s condition  * The patient required frequent reassessments and monitoring to ensure response to interventions and therapies.    Critical care time includes time spent evaluating and treating the patient's acute illness as well as time spent reviewing labs, radiology,  and discussing the case with a multidisciplinary team in an effort to prevent further life threatening deterioration or end organ damage. This time is independent of any procedures performed.
ACP

## 2023-09-21 NOTE — DISCHARGE NOTE PROVIDER - HOSPITAL COURSE
63M with hx of Follicular Lymphoma on Rituxan, chronic hepatitis B on entecavir, HLD, BPH with overflow incontinence, who presents with 1 month of cough secondary to pneumonia also found to have acute on chronic eosinophilia. CT chest showed bilateral lower lobe consolidation R<L and was started on zosyn. Heme onc and ID were consulted for eosinophilia workup. Stool O&P turned back positive for Blastocystis hominis and while typically non-pathogenic patient was started on metronidazole given eosinophilia and immunocompromised state.    63M with hx of Follicular Lymphoma on Rituxan, chronic hepatitis B on entecavir, HLD, BPH with overflow incontinence, who presents with 1 month of cough secondary to pneumonia also found to have acute on chronic eosinophilia. CT chest showed bilateral lower lobe consolidation R<L and was started on zosyn and completed course of antibiotics while in the hospital. Heme onc and ID were consulted for eosinophilia workup. Stool O&P turned back positive for Blastocystis hominis and while typically non-pathogenic patient was started on metronidazole given eosinophilia and immunocompromised state. He had bone marrow biopsy done for persistent eosinophilia, however the eosinophilia improved and is pending at time of discharge, he will follow up with his hematologist Dr. Mcgill next week to discuss results.

## 2023-09-21 NOTE — DISCHARGE NOTE PROVIDER - NSDCCPCAREPLAN_GEN_ALL_CORE_FT
PRINCIPAL DISCHARGE DIAGNOSIS  Diagnosis: CAP (community acquired pneumonia)  Assessment and Plan of Treatment: You had a CT scan shat showed large pneumonia. You were treated with IV antibiotics in the hospital.      SECONDARY DISCHARGE DIAGNOSES  Diagnosis: Eosinophilia  Assessment and Plan of Treatment: You were found to have highly elevated levels of eosinophils which is a type of white blood cell. You were seen by the Hematologists and the Infectious disease doctors and were evaluated for numerous conditions including fungal, TB, bacterial, parasitic infections. You were found to have a parasite in your stool called Blastocystis hominis and was started antibiotic called flagyl. Please follow up with your hematologist and infectious disease doctor.    Diagnosis: Pulmonary nodules  Assessment and Plan of Treatment: You were found to have a small pulmonary noduleon CT scan. Please obtain repeat CT scan with your PCP/oncologist in 2-3 months.    Diagnosis: Follicular lymphoma  Assessment and Plan of Treatment: Please follow up with your Heme-Onc doctor.     PRINCIPAL DISCHARGE DIAGNOSIS  Diagnosis: CAP (community acquired pneumonia)  Assessment and Plan of Treatment: You had a CT scan shat showed large pneumonia. You were treated with IV antibiotics in the hospital and completed your course.      SECONDARY DISCHARGE DIAGNOSES  Diagnosis: Eosinophilia  Assessment and Plan of Treatment: You were found to have highly elevated levels of eosinophils which is a type of white blood cell. You were seen by the Hematologists and the Infectious disease doctors and were evaluated for numerous conditions including fungal, TB, bacterial, parasitic infections. You were found to have a parasite in your stool called Blastocystis hominis and were treated with antibiotic called flagyl. You also had bone marrow biopsy done for further evaulation. Please follow up with your hematologist to review the results as they are pending at the time of your discharge.    Diagnosis: Follicular lymphoma  Assessment and Plan of Treatment: Please follow up with your Heme-Onc doctor.    Diagnosis: Pulmonary nodules  Assessment and Plan of Treatment: You were found to have a small pulmonary noduleon CT scan. Please obtain repeat CT scan with your PCP/oncologist in 2-3 months.

## 2023-09-21 NOTE — PROGRESS NOTE ADULT - ASSESSMENT
Lopez Jade is a 63 y.o male with a history of non-Hodgkin lymphoma on rituximab (last dose 8/22/2023) presenting with 1 month of productive cough, SOB, myalgias, vomiting, and diarrhea.  ID is being consulted for c/f PNA and to r/o TB.  Lopez Jade is a 63 y.o male with a history of non-Hodgkin lymphoma on rituximab (last dose 8/22/2023) presenting with 1 month of productive cough, SOB, myalgias, vomiting, and diarrhea.  ID is being consulted for c/f PNA and to r/o TB.
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
## 2023-09-21 NOTE — PROGRESS NOTE ADULT - SUBJECTIVE AND OBJECTIVE BOX
Patient is a 63y old  Male who presents with a chief complaint of resp infection (21 Sep 2023 10:40)    Sinan Cormier MD   [facility]   Pager [phone]  Reachable on [employer] Teams     SUBJECTIVE / OVERNIGHT EVENTS:  Patient seen and examined today with son at bedside. Still feeling unwell.  No chest pain, fevers, chills.     MEDICATIONS  (STANDING):  enoxaparin Injectable 40 milliGRAM(s) SubCutaneous every 24 hours  piperacillin/tazobactam IVPB.. 3.375 Gram(s) IV Intermittent every 8 hours  sodium chloride 0.9%. 1000 milliLiter(s) (100 mL/Hr) IV Continuous <Continuous>  sodium chloride 3%  Inhalation 4 milliLiter(s) Inhalation every 8 hours  tamsulosin 0.4 milliGRAM(s) Oral at bedtime    MEDICATIONS  (PRN):  albuterol/ipratropium for Nebulization 3 milliLiter(s) Nebulizer every 6 hours PRN Bronchospasm      Vital Signs Last 24 Hrs  T(C): 37.4 (21 Sep 2023 09:40), Max: 37.4 (21 Sep 2023 09:40)  T(F): 99.3 (21 Sep 2023 09:40), Max: 99.3 (21 Sep 2023 09:40)  HR: 96 (21 Sep 2023 11:58) (84 - 98)  BP: 119/73 (21 Sep 2023 09:40) (103/63 - 119/73)  BP(mean): --  RR: 18 (21 Sep 2023 09:40) (17 - 18)  SpO2: 97% (21 Sep 2023 11:58) (94% - 99%)  CAPILLARY BLOOD GLUCOSE        I&O's Summary    21 Sep 2023 07:01  -  21 Sep 2023 13:00  --------------------------------------------------------  IN: 275 mL / OUT: 0 mL / NET: 275 mL        General: NAD, awake and alert  Eyes: conjunctiva clear, nonicteric sclera  HENMT: NCAT, MMM  Neck: Supple, trachea midline   Respiratory: No respiratory distress, CTABL, No rales, rhonchi, wheezing.  Cardiovascular: S1,S2; Regular rate and rhythm; No m/g/r. 2+ peripheral pulses  Gastrointestinal: Soft, Nontender, Nondistended; +BS.   Extremities: No c/c/e; warm to touch  Neurological: Moving all 4 extremities; Sensation to LT grossly in tact.  Skin: No rashes, No erythema   Psych: AAOx3; appropriate mood and affect    LABS:                        12.0   14.01 )-----------( 266      ( 21 Sep 2023 05:57 )             36.3     09-21    141  |  104  |  8   ----------------------------<  89  3.5   |  24  |  1.21    Ca    8.1<L>      21 Sep 2023 05:57    TPro  5.8<L>  /  Alb  2.9<L>  /  TBili  0.4  /  DBili  x   /  AST  17  /  ALT  37  /  AlkPhos  148<H>  09-21          Urinalysis Basic - ( 21 Sep 2023 05:57 )    Color: x / Appearance: x / SG: x / pH: x  Gluc: 89 mg/dL / Ketone: x  / Bili: x / Urobili: x   Blood: x / Protein: x / Nitrite: x   Leuk Esterase: x / RBC: x / WBC x   Sq Epi: x / Non Sq Epi: x / Bacteria: x        RADIOLOGY & ADDITIONAL TESTS:    Imaging Personally Reviewed:    Consultant(s) Notes Reviewed:      Care Discussed with Consultants/Other Providers:

## 2023-09-21 NOTE — CONSULT NOTE ADULT - SUBJECTIVE AND OBJECTIVE BOX
HEMATOLOGY ONCOLOGY CONSULT     Patient is a 63y old  Male who presents with a chief complaint of resp infection (21 Sep 2023 10:40)      HPI:  63M, Azeri-speaking, with hx of Non-Hodgkin's Lymphoma on Rituxan , Hepatitis B on entecavir (now?) , Campylobacter/EPEC gastroenteritis (11/2022), HLD, BPH who presents with 1 month of cough and constitutional symptoms. Symptoms began before going to [facility] improved there with abx, but did not completely resolve. Now complaining of productive cough, chest pain when coughing, nausea, vomiting, loose stool, anorexia, weakness, insomnia for cough. CXR prelim clear. + Rhinovirus on rvp. Pt unsure of prior TB screenings, but was started on rituxan within last year per son (last given Aug 22)  T max here 100.7 (O), wbc 13.9 (20 Sep 2023 01:00)       ROS:  Negative except for:    PAST MEDICAL & SURGICAL HISTORY:  Non Hodgkin's lymphoma      Hepatitis B      History of cholecystectomy          SOCIAL HISTORY:    FAMILY HISTORY:  FH: lymphoma (Mother)        MEDICATIONS  (STANDING):  enoxaparin Injectable 40 milliGRAM(s) SubCutaneous every 24 hours  piperacillin/tazobactam IVPB.. 3.375 Gram(s) IV Intermittent every 8 hours  sodium chloride 0.9%. 1000 milliLiter(s) (100 mL/Hr) IV Continuous <Continuous>  sodium chloride 3%  Inhalation 4 milliLiter(s) Inhalation every 8 hours  tamsulosin 0.4 milliGRAM(s) Oral at bedtime    MEDICATIONS  (PRN):  albuterol/ipratropium for Nebulization 3 milliLiter(s) Nebulizer every 6 hours PRN Bronchospasm      Allergies    No Known Allergies    Intolerances        Vital Signs Last 24 Hrs  T(C): 37.4 (21 Sep 2023 09:40), Max: 37.4 (21 Sep 2023 09:40)  T(F): 99.3 (21 Sep 2023 09:40), Max: 99.3 (21 Sep 2023 09:40)  HR: 96 (21 Sep 2023 11:58) (84 - 98)  BP: 119/73 (21 Sep 2023 09:40) (103/63 - 119/73)  BP(mean): --  RR: 18 (21 Sep 2023 09:40) (17 - 18)  SpO2: 97% (21 Sep 2023 11:58) (94% - 99%)    Parameters below as of 21 Sep 2023 11:58  Patient On (Oxygen Delivery Method): nasal cannula        PHYSICAL EXAM  General: adult in NAD  HEENT: clear oropharynx, anicteric sclera, pink conjunctiva  Neck: supple  CV: normal S1/S2 with no murmur rubs or gallops  Lungs: positive air movement b/l ant lungs,clear to auscultation, no wheezes, no rales  Abdomen: soft non-tender non-distended, no hepatosplenomegaly  Ext: no clubbing cyanosis or edema  Skin: no rashes and no petechiae  Neuro: alert and oriented X 4, no focal deficits      09-21-23 @ 07:01  -  09-21-23 @ 13:03  --------------------------------------------------------  IN: 275 mL / OUT: 0 mL / NET: 275 mL      LABS:                          12.0   14.01 )-----------( 266      ( 21 Sep 2023 05:57 )             36.3         Mean Cell Volume : 92.8 fL  Mean Cell Hemoglobin : 30.7 pg  Mean Cell Hemoglobin Concentration : 33.1 gm/dL  Auto Neutrophil # : x  Auto Lymphocyte # : x  Auto Monocyte # : x  Auto Eosinophil # : x  Auto Basophil # : x  Auto Neutrophil % : x  Auto Lymphocyte % : x  Auto Monocyte % : x  Auto Eosinophil % : x  Auto Basophil % : x      09-21    141  |  104  |  8   ----------------------------<  89  3.5   |  24  |  1.21    Ca    8.1<L>      21 Sep 2023 05:57    TPro  5.8<L>  /  Alb  2.9<L>  /  TBili  0.4  /  DBili  x   /  AST  17  /  ALT  37  /  AlkPhos  148<H>  09-21          Vitamin B12, Serum: 1979 pg/mL (09-21 @ 05:57)              BLOOD SMEAR INTERPRETATION:       RADIOLOGY & ADDITIONAL STUDIES:

## 2023-09-21 NOTE — DISCHARGE NOTE PROVIDER - NSDCMRMEDTOKEN_GEN_ALL_CORE_FT
acyclovir 400 mg oral tablet: 1 tab(s) orally 2 times a day  atorvastatin 20 mg oral tablet: 1 tab(s) orally once a day (at bedtime)  entecavir 0.5 mg oral tablet: 1 tab(s) orally once a day  gemfibrozil 600 mg oral tablet: 1 tab(s) orally 2 times a day  oxyBUTYnin 5 mg/24 hours oral tablet, extended release: 1 tab(s) orally once a day  Symbicort 160 mcg-4.5 mcg/inh inhalation aerosol: 2 puff(s) inhaled 2 times a day  tamsulosin 0.4 mg oral capsule: 1 tab(s) orally once a day   acyclovir 400 mg oral tablet: 1 tab(s) orally 2 times a day  entecavir 0.5 mg oral tablet: 1 tab(s) orally once a day  gemfibrozil 600 mg oral tablet: 1 tab(s) orally 2 times a day  oxyBUTYnin 5 mg/24 hours oral tablet, extended release: 1 tab(s) orally once a day  Symbicort 160 mcg-4.5 mcg/inh inhalation aerosol: 2 puff(s) inhaled 2 times a day  tamsulosin 0.4 mg oral capsule: 1 tab(s) orally once a day

## 2023-09-21 NOTE — DISCHARGE NOTE PROVIDER - NSDCCPTREATMENT_GEN_ALL_CORE_FT
PRINCIPAL PROCEDURE  Procedure: CT chest wo contrast  Findings and Treatment: PROCEDURE DATE:  09/20/2023    INTERPRETATION:  CLINICAL INFORMATION: Productive cough. History of   lymphoma.  COMPARISON: CT abdomen and pelvis 4/14/2023  CONTRAST/COMPLICATIONS:  IV Contrast: NONE  Oral Contrast: NONE  Complications: None reported at time of study completion  PROCEDURE:  CT of the Chest was performed.  Sagittal and coronal reformats were performed.  FINDINGS:  LUNGS AND AIRWAYS: Patent central airways.Right apical lung hernia.   Bronchial wall thickening and mucus impaction of the bilateral lower   lobes with patchy consolidative changes in the bilateral lower lobes,   right greater than left. Right middle lobe subpleural nodule measuring 5   mm (301-83). Emphysema.  PLEURA: No pleural effusion.  MEDIASTINUM AND DANA: Hilar enlarged subcarinal lymph node measuring 1.2   cm in short axis.  VESSELS: Within normal limits.  HEART: Heart size is normal. No pericardial effusion. Coronary artery   calcifications.  CHEST WALL AND LOWER NECK: Within normal limits.  VISUALIZED UPPER ABDOMEN: Cholecystectomy. Right renal cysts. Soft tissue   mass in the retroperitoneum within the left lateral margin of the aorta   measuring roughly 4.2 x 3.5 cm, slightly enlarged in size compared to the   prior study (previously measuring 4.2 x 2.9 cm). Mesenteric fat   infiltration, new from prior exam.  BONES: Within normal limits.  IMPRESSION:  Emphysema. Bibasilar consolidations representing pneumonia. Incidental 5   mm right middle lobe nodule and mildly enlarged subcarinal lymph node.   Recommend follow-up chest CT in 1-3 months to determine resolution.  Mesenteric fat infiltration new from CT abdomen dated 4/14/2023,   indeterminant etiology. Further evaluation can be performed with   dedicated contrast-enhanced CT abdomen and pelvis.  Retroperitoneal mass is again noted.

## 2023-09-21 NOTE — DISCHARGE NOTE PROVIDER - NSDCFUSCHEDAPPT_GEN_ALL_CORE_FT
Bernarda Kline  [facility]  [address] [place] [address]  Scheduled Appointment: 10/06/2023

## 2023-09-22 ENCOUNTER — TRANSCRIPTION ENCOUNTER (OUTPATIENT)
Age: 63
End: 2023-09-22

## 2023-09-22 LAB
ALBUMIN SERPL ELPH-MCNC: 3.2 G/DL — LOW (ref 3.3–5)
ALP SERPL-CCNC: 191 U/L — HIGH (ref 40–120)
ALT FLD-CCNC: 34 U/L — SIGNIFICANT CHANGE UP (ref 4–41)
ANION GAP SERPL CALC-SCNC: 10 MMOL/L — SIGNIFICANT CHANGE UP (ref 7–14)
AST SERPL-CCNC: 18 U/L — SIGNIFICANT CHANGE UP (ref 4–40)
BASOPHILS # BLD AUTO: 0.05 K/UL — SIGNIFICANT CHANGE UP (ref 0–0.2)
BASOPHILS NFR BLD AUTO: 0.3 % — SIGNIFICANT CHANGE UP (ref 0–2)
BILIRUB SERPL-MCNC: 0.4 MG/DL — SIGNIFICANT CHANGE UP (ref 0.2–1.2)
BUN SERPL-MCNC: 7 MG/DL — SIGNIFICANT CHANGE UP (ref 7–23)
CALCIUM SERPL-MCNC: 9.3 MG/DL — SIGNIFICANT CHANGE UP (ref 8.4–10.5)
CHLORIDE SERPL-SCNC: 103 MMOL/L — SIGNIFICANT CHANGE UP (ref 98–107)
CO2 SERPL-SCNC: 28 MMOL/L — SIGNIFICANT CHANGE UP (ref 22–31)
CREAT SERPL-MCNC: 1.27 MG/DL — SIGNIFICANT CHANGE UP (ref 0.5–1.3)
CULTURE RESULTS: SIGNIFICANT CHANGE UP
EGFR: 63 ML/MIN/1.73M2 — SIGNIFICANT CHANGE UP
EOSINOPHIL # BLD AUTO: 9.87 K/UL — HIGH (ref 0–0.5)
EOSINOPHIL NFR BLD AUTO: 68.3 % — HIGH (ref 0–6)
GLUCOSE SERPL-MCNC: 110 MG/DL — HIGH (ref 70–99)
HCT VFR BLD CALC: 39.7 % — SIGNIFICANT CHANGE UP (ref 39–50)
HGB BLD-MCNC: 12.7 G/DL — LOW (ref 13–17)
IANC: 2.51 K/UL — SIGNIFICANT CHANGE UP (ref 1.8–7.4)
IMM GRANULOCYTES NFR BLD AUTO: 1.6 % — HIGH (ref 0–0.9)
LYMPHOCYTES # BLD AUTO: 1 K/UL — SIGNIFICANT CHANGE UP (ref 1–3.3)
LYMPHOCYTES # BLD AUTO: 6.9 % — LOW (ref 13–44)
MAGNESIUM SERPL-MCNC: 2.4 MG/DL — SIGNIFICANT CHANGE UP (ref 1.6–2.6)
MCHC RBC-ENTMCNC: 30 PG — SIGNIFICANT CHANGE UP (ref 27–34)
MCHC RBC-ENTMCNC: 32 GM/DL — SIGNIFICANT CHANGE UP (ref 32–36)
MCV RBC AUTO: 93.6 FL — SIGNIFICANT CHANGE UP (ref 80–100)
MONOCYTES # BLD AUTO: 0.79 K/UL — SIGNIFICANT CHANGE UP (ref 0–0.9)
MONOCYTES NFR BLD AUTO: 5.5 % — SIGNIFICANT CHANGE UP (ref 2–14)
NEUTROPHILS # BLD AUTO: 2.51 K/UL — SIGNIFICANT CHANGE UP (ref 1.8–7.4)
NEUTROPHILS NFR BLD AUTO: 17.4 % — LOW (ref 43–77)
NIGHT BLUE STAIN TISS: SIGNIFICANT CHANGE UP
NRBC # BLD: 0 /100 WBCS — SIGNIFICANT CHANGE UP (ref 0–0)
NRBC # FLD: 0 K/UL — SIGNIFICANT CHANGE UP (ref 0–0)
NT-PROBNP SERPL-SCNC: 102 PG/ML — SIGNIFICANT CHANGE UP
PHOSPHATE SERPL-MCNC: 4.3 MG/DL — SIGNIFICANT CHANGE UP (ref 2.5–4.5)
PLATELET # BLD AUTO: 298 K/UL — SIGNIFICANT CHANGE UP (ref 150–400)
POTASSIUM SERPL-MCNC: 4.3 MMOL/L — SIGNIFICANT CHANGE UP (ref 3.5–5.3)
POTASSIUM SERPL-SCNC: 4.3 MMOL/L — SIGNIFICANT CHANGE UP (ref 3.5–5.3)
PROT SERPL-MCNC: 6.5 G/DL — SIGNIFICANT CHANGE UP (ref 6–8.3)
RBC # BLD: 4.24 M/UL — SIGNIFICANT CHANGE UP (ref 4.2–5.8)
RBC # FLD: 13.7 % — SIGNIFICANT CHANGE UP (ref 10.3–14.5)
SODIUM SERPL-SCNC: 141 MMOL/L — SIGNIFICANT CHANGE UP (ref 135–145)
SPECIMEN SOURCE: SIGNIFICANT CHANGE UP
SPECIMEN SOURCE: SIGNIFICANT CHANGE UP
WBC # BLD: 14.26 K/UL — HIGH (ref 3.8–10.5)
WBC # FLD AUTO: 14.26 K/UL — HIGH (ref 3.8–10.5)

## 2023-09-22 PROCEDURE — 99232 SBSQ HOSP IP/OBS MODERATE 35: CPT

## 2023-09-22 PROCEDURE — 74177 CT ABD & PELVIS W/CONTRAST: CPT | Mod: 26

## 2023-09-22 RX ORDER — OXYBUTYNIN CHLORIDE 5 MG
5 TABLET ORAL DAILY
Refills: 0 | Status: DISCONTINUED | OUTPATIENT
Start: 2023-09-22 | End: 2023-09-22

## 2023-09-22 RX ORDER — OXYBUTYNIN CHLORIDE 5 MG
5 TABLET ORAL DAILY
Refills: 0 | Status: DISCONTINUED | OUTPATIENT
Start: 2023-09-22 | End: 2023-10-03

## 2023-09-22 RX ORDER — METRONIDAZOLE 500 MG
750 TABLET ORAL THREE TIMES A DAY
Refills: 0 | Status: COMPLETED | OUTPATIENT
Start: 2023-09-22 | End: 2023-10-02

## 2023-09-22 RX ADMIN — TAMSULOSIN HYDROCHLORIDE 0.4 MILLIGRAM(S): 0.4 CAPSULE ORAL at 22:00

## 2023-09-22 RX ADMIN — Medication 3 MILLIGRAM(S): at 22:10

## 2023-09-22 RX ADMIN — Medication 750 MILLIGRAM(S): at 22:01

## 2023-09-22 RX ADMIN — Medication 400 MILLIGRAM(S): at 06:01

## 2023-09-22 RX ADMIN — Medication 5 MILLIGRAM(S): at 13:55

## 2023-09-22 RX ADMIN — PIPERACILLIN AND TAZOBACTAM 25 GRAM(S): 4; .5 INJECTION, POWDER, LYOPHILIZED, FOR SOLUTION INTRAVENOUS at 13:01

## 2023-09-22 RX ADMIN — Medication 400 MILLIGRAM(S): at 16:15

## 2023-09-22 RX ADMIN — Medication 100 MILLIGRAM(S): at 16:15

## 2023-09-22 RX ADMIN — PIPERACILLIN AND TAZOBACTAM 25 GRAM(S): 4; .5 INJECTION, POWDER, LYOPHILIZED, FOR SOLUTION INTRAVENOUS at 22:01

## 2023-09-22 RX ADMIN — PIPERACILLIN AND TAZOBACTAM 25 GRAM(S): 4; .5 INJECTION, POWDER, LYOPHILIZED, FOR SOLUTION INTRAVENOUS at 06:01

## 2023-09-22 RX ADMIN — ENOXAPARIN SODIUM 40 MILLIGRAM(S): 100 INJECTION SUBCUTANEOUS at 21:59

## 2023-09-22 RX ADMIN — ENTECAVIR 0.5 MILLIGRAM(S): 0.5 TABLET ORAL at 12:59

## 2023-09-22 RX ADMIN — Medication 750 MILLIGRAM(S): at 13:54

## 2023-09-22 RX ADMIN — Medication 650 MILLIGRAM(S): at 22:00

## 2023-09-22 RX ADMIN — Medication 650 MILLIGRAM(S): at 00:39

## 2023-09-22 NOTE — PROGRESS NOTE ADULT - ASSESSMENT
63 m with non hodgkin's lymphoma on rituxan last 8/28, has had cough, SOB which started a month ago and got worse from dry cough to productive, also has no appetite with intermittent diarrhea, vomiting and abd pain, he just came back from a16 day trip to [facility] but his symptoms started before the trip and was given antibiotics in [facility], initially felt better but then again worsened   here febrile to 100.7  WBC: 14, eso: 9.52  RVP: entero/rhino  CXR clear    immunocompromised pt with non hodgkin's lymphoma on ritixan with a month of cough and SOB, progressively worsening, also just came back from [facility] but symptoms started  before, here with fever and eosinophilia which was present before but now 9000  entero/rhino pneumonia vs eosinophilic pneumonia  CT with bibasilar pneumonia  the whole picture is not s/o TB and quantiferon negative, AFB smear x 3 negative  pt had some diarrhea and O&P now with moderate blastomyces hominis    * f/u the sputum cx  * blastomyces is usually non pathogenic but as pt is immunocompromised with diarrhea and eosinophilia will treat, start metro 750 tid for 10 castellanos  * c/w zosyn for now, started 9/20 now day 3  * eosinophilia w/u, f/u strongyloides, toxocara, filaria, coccidioides Ab, histo Ag  * f/u with hem/onc and pulm eval  * monitor CBc/diff and CMP  * discontinue airborne    The above assessment and plan was discussed with the primary team    Malou Cortes MD  contact on teams  After 5pm and on weekends call [phone]

## 2023-09-22 NOTE — PROGRESS NOTE ADULT - SUBJECTIVE AND OBJECTIVE BOX
Follow Up:  pneumonia, eosinophilia    Interval History/ROS: pt afebrile, still with cough and SOB, no vomiting or rash, O&P showed moderate blastomyces           Allergies  No Known Allergies        ANTIMICROBIALS:  acyclovir   Oral Tab/Cap 400 two times a day  entecavir 0.5 daily  metroNIDAZOLE    Tablet 750 three times a day  piperacillin/tazobactam IVPB.. 3.375 every 8 hours      OTHER MEDS:  acetaminophen     Tablet .. 650 milliGRAM(s) Oral every 6 hours PRN  albuterol/ipratropium for Nebulization 3 milliLiter(s) Nebulizer every 6 hours PRN  benzonatate 100 milliGRAM(s) Oral every 8 hours PRN  enoxaparin Injectable 40 milliGRAM(s) SubCutaneous every 24 hours  melatonin 3 milliGRAM(s) Oral at bedtime PRN  oxybutynin XL 5 milliGRAM(s) Oral daily  tamsulosin 0.4 milliGRAM(s) Oral at bedtime      Vital Signs Last 24 Hrs  T(C): 37 (22 Sep 2023 10:27), Max: 37.1 (22 Sep 2023 05:58)  T(F): 98.6 (22 Sep 2023 10:27), Max: 98.7 (22 Sep 2023 05:58)  HR: 89 (22 Sep 2023 10:27) (81 - 94)  BP: 125/90 (22 Sep 2023 10:27) (102/61 - 125/90)  BP(mean): --  RR: 18 (22 Sep 2023 10:27) (18 - 18)  SpO2: 97% (22 Sep 2023 10:27) (95% - 99%)    Parameters below as of 22 Sep 2023 10:27  Patient On (Oxygen Delivery Method): nasal cannula  O2 Flow (L/min): 2      Physical Exam:  General:    NAD,  non toxic  Respiratory:    comfortable on NC  abd:     soft,   no tenderness  :   no CVAT,  no suprapubic tenderness,   no  miller  Musculoskeletal:   no joint swelling  vascular: no phlebitis  Skin:    no rash                          12.7   14.26 )-----------( 298      ( 22 Sep 2023 07:00 )             39.7       09-22    141  |  103  |  7   ----------------------------<  110<H>  4.3   |  28  |  1.27    Ca    9.3      22 Sep 2023 07:00  Phos  4.3     09-22  Mg     2.40     09-22    TPro  6.5  /  Alb  3.2<L>  /  TBili  0.4  /  DBili  x   /  AST  18  /  ALT  34  /  AlkPhos  191<H>  09-22      Urinalysis Basic - ( 22 Sep 2023 07:00 )    Color: x / Appearance: x / SG: x / pH: x  Gluc: 110 mg/dL / Ketone: x  / Bili: x / Urobili: x   Blood: x / Protein: x / Nitrite: x   Leuk Esterase: x / RBC: x / WBC x   Sq Epi: x / Non Sq Epi: x / Bacteria: x        MICROBIOLOGY:  v  .Sputum Sputum  09-21-23 --  --  --      .Sputum Sputum  09-21-23 --  --  --      .Sputum Sputum  09-20-23   Moderate Blastocystis hominis Cysts observed  performed by trichrome stain  (routine O+P not evaluated for Microsporidia,  Cryptosporidia or Cyclospora)  --  --      .Blood Blood-Peripheral  09-20-23   No growth at 24 hours  --  --      .Blood Blood-Peripheral  09-20-23   No growth at 24 hours  --  --      .Sputum Sputum  09-20-23 --  --    Few polymorphonuclear leukocytes per low power field  Moderate Squamous epithelial cells per low power field  Moderate Gram Positive Cocci in Pairs and Chains per oil power field  Few Gram Positive Rods per oil power field  Few Gram Negative Rods per oil power field          Rapid RVP Result: Detected (09-19 @ 21:48)        RADIOLOGY:  Images independently visualized and reviewed personally, findings as below  < from: CT Chest No Cont (09.20.23 @ 06:30) >  IMPRESSION:  Emphysema. Bibasilar consolidations representing pneumonia. Incidental 5   mm right middle lobe nodule and mildly enlarged subcarinal lymph node.   Recommend follow-up chest CT in 1-3 months to determine resolution.    Mesenteric fat infiltration new from CT abdomen dated 4/14/2023,   indeterminant etiology. Further evaluation can be performed with   dedicated contrast-enhanced CT abdomen and pelvis.    Retroperitoneal mass is again noted.      < end of copied text >

## 2023-09-22 NOTE — DIETITIAN INITIAL EVALUATION ADULT - ORAL NUTRITION SUPPLEMENTS
Suggest Ensure Plus High Protein Therapeutic Shake 1x daily (350 kcal, 20g protein) to optimize nutrition

## 2023-09-22 NOTE — DIETITIAN INITIAL EVALUATION ADULT - PERTINENT MEDS FT
MEDICATIONS  (STANDING):  acyclovir   Oral Tab/Cap 400 milliGRAM(s) Oral two times a day  enoxaparin Injectable 40 milliGRAM(s) SubCutaneous every 24 hours  entecavir 0.5 milliGRAM(s) Oral daily  metroNIDAZOLE    Tablet 750 milliGRAM(s) Oral three times a day  oxybutynin XL 5 milliGRAM(s) Oral daily  piperacillin/tazobactam IVPB.. 3.375 Gram(s) IV Intermittent every 8 hours  sodium chloride 3%  Inhalation 4 milliLiter(s) Inhalation every 8 hours  tamsulosin 0.4 milliGRAM(s) Oral at bedtime    MEDICATIONS  (PRN):  acetaminophen     Tablet .. 650 milliGRAM(s) Oral every 6 hours PRN Temp greater or equal to 38C (100.4F), Mild Pain (1 - 3)  albuterol/ipratropium for Nebulization 3 milliLiter(s) Nebulizer every 6 hours PRN Bronchospasm  benzonatate 100 milliGRAM(s) Oral every 8 hours PRN Cough  melatonin 3 milliGRAM(s) Oral at bedtime PRN Insomnia

## 2023-09-22 NOTE — PROGRESS NOTE ADULT - SUBJECTIVE AND OBJECTIVE BOX
Patient is a 63y old  Male who presents with a chief complaint of resp infection (21 Sep 2023 13:01)    Sinan Cormier MD   [facility] Division of Hospital Medicine   Pager [phone]  Reachable on [employer] Teams     SUBJECTIVE / OVERNIGHT EVENTS:  Patient seen and examined this morning with son at bedside. He states that he continues to have cough and productive sputum production.  Having some diaphragmatic pain.     MEDICATIONS  (STANDING):  acyclovir   Oral Tab/Cap 400 milliGRAM(s) Oral two times a day  enoxaparin Injectable 40 milliGRAM(s) SubCutaneous every 24 hours  entecavir 0.5 milliGRAM(s) Oral daily  metroNIDAZOLE    Tablet 750 milliGRAM(s) Oral three times a day  oxybutynin XL 5 milliGRAM(s) Oral daily  piperacillin/tazobactam IVPB.. 3.375 Gram(s) IV Intermittent every 8 hours  sodium chloride 3%  Inhalation 4 milliLiter(s) Inhalation every 8 hours  tamsulosin 0.4 milliGRAM(s) Oral at bedtime    MEDICATIONS  (PRN):  acetaminophen     Tablet .. 650 milliGRAM(s) Oral every 6 hours PRN Temp greater or equal to 38C (100.4F), Mild Pain (1 - 3)  albuterol/ipratropium for Nebulization 3 milliLiter(s) Nebulizer every 6 hours PRN Bronchospasm  melatonin 3 milliGRAM(s) Oral at bedtime PRN Insomnia      Vital Signs Last 24 Hrs  T(C): 37 (22 Sep 2023 10:27), Max: 37.4 (21 Sep 2023 16:25)  T(F): 98.6 (22 Sep 2023 10:27), Max: 99.4 (21 Sep 2023 16:25)  HR: 89 (22 Sep 2023 10:27) (81 - 95)  BP: 125/90 (22 Sep 2023 10:27) (102/61 - 125/90)  BP(mean): --  RR: 18 (22 Sep 2023 10:27) (18 - 18)  SpO2: 97% (22 Sep 2023 10:27) (95% - 99%)  CAPILLARY BLOOD GLUCOSE        I&O's Summary    21 Sep 2023 07:01  -  22 Sep 2023 07:00  --------------------------------------------------------  IN: 1375 mL / OUT: 900 mL / NET: 475 mL    22 Sep 2023 07:01  -  22 Sep 2023 13:07  --------------------------------------------------------  IN: 300 mL / OUT: 0 mL / NET: 300 mL        General: man laying down in bed appears comfortable in NAD, awake and alert  HENMT: MMM   Respiratory: No respiratory distress, decreased breath sounds in the bases, No wheezing.  Cardiovascular: S1,S2; No m/g/r.  Gastrointestinal: Soft, Nontender, Nondistended; +BS.   Extremities: No c/c/e; warm to touch  Skin: No rashes, No erythema   Psych: appropriate mood and affect    LABS:                        12.7   14.26 )-----------( 298      ( 22 Sep 2023 07:00 )             39.7     09-22    141  |  103  |  7   ----------------------------<  110<H>  4.3   |  28  |  1.27    Ca    9.3      22 Sep 2023 07:00  Phos  4.3     09-22  Mg     2.40     09-22    TPro  6.5  /  Alb  3.2<L>  /  TBili  0.4  /  DBili  x   /  AST  18  /  ALT  34  /  AlkPhos  191<H>  09-22          Urinalysis Basic - ( 22 Sep 2023 07:00 )    Color: x / Appearance: x / SG: x / pH: x  Gluc: 110 mg/dL / Ketone: x  / Bili: x / Urobili: x   Blood: x / Protein: x / Nitrite: x   Leuk Esterase: x / RBC: x / WBC x   Sq Epi: x / Non Sq Epi: x / Bacteria: x        RADIOLOGY & ADDITIONAL TESTS:    Imaging Personally Reviewed:    Consultant(s) Notes Reviewed:      Care Discussed with Consultants/Other Providers:

## 2023-09-22 NOTE — DIETITIAN INITIAL EVALUATION ADULT - PROBLEM SELECTOR PLAN 1
-possible stemming from rhinovirus infection; however, superimposed bacterial infection including TB given country of origin and rituxan use  -hold abx for now, supportive care with IVF, prn nebs, and chest pt; if cough prevents sleep , would place on prn hycodan   -chest ct, procalcitonin, sputum and gold QuantiFeron ordered  -elevated eosinophil may reflect known lymphoma, but would consider helminth or fugal infection as part of DD; would obtain ID eval  -given ALL can present with hypereosinophilia, will order blood smear,; Hem-Onc emailed

## 2023-09-22 NOTE — DIETITIAN INITIAL EVALUATION ADULT - PERTINENT LABORATORY DATA
09-22    141  |  103  |  7   ----------------------------<  110<H>  4.3   |  28  |  1.27    Ca    9.3      22 Sep 2023 07:00  Phos  4.3     09-22  Mg     2.40     09-22    TPro  6.5  /  Alb  3.2<L>  /  TBili  0.4  /  DBili  x   /  AST  18  /  ALT  34  /  AlkPhos  191<H>  09-22  A1C with Estimated Average Glucose Result: 5.9 % (10-29-22 @ 05:02)

## 2023-09-22 NOTE — PROGRESS NOTE ADULT - ASSESSMENT
63M with hx of Follicular Lymphoma on Rituxan, chronic hepatitis B on entecavir, HLD, BPH with overflow incontinence, who presents with 1 month of cough secondary to pneumonia also found to have acute on chronic eosinophilia.

## 2023-09-22 NOTE — DIETITIAN INITIAL EVALUATION ADULT - OTHER INFO
Nutrition assessment for nutrition risk prioritization - Oncology.      62 yo m with persistent resp infection - CAP; superimposed bacterial infection including TB given country of origin and rituxan use; follicular lymphoma, eosinophilia, pulmonary nodules, BPH, Hep B. Medical w/u pending.     Patient sleeping, contacted family for collateral (daughter - emergency contact).  Patient with fair appetite.  Spoke with RN for collateral.  No chewing or swallowing difficulties reported at this time.  Per RN, loose BM reported on admission, no current report of diarrhea.  Unsure of weight history, stated as per last oncology visit in august, oncologist indicated weight was trending back up. No height and weight currently recorded, unable to assess weight status. Weight Oct 2022 - 63.9kgs, April 2023 - 71.3kg.  Height 170.2cm per HIE.  Per conversation with daughter, Pt. keeps Halal and consumes Ensure daily, same to be provided in hospital.  No food allergies reported or listed on chart.

## 2023-09-23 LAB
ALBUMIN SERPL ELPH-MCNC: 3 G/DL — LOW (ref 3.3–5)
ALP SERPL-CCNC: 181 U/L — HIGH (ref 40–120)
ALT FLD-CCNC: 31 U/L — SIGNIFICANT CHANGE UP (ref 4–41)
ANA TITR SER: NEGATIVE — SIGNIFICANT CHANGE UP
ANION GAP SERPL CALC-SCNC: 15 MMOL/L — HIGH (ref 7–14)
AST SERPL-CCNC: 14 U/L — SIGNIFICANT CHANGE UP (ref 4–40)
BASOPHILS # BLD AUTO: 0.08 K/UL — SIGNIFICANT CHANGE UP (ref 0–0.2)
BASOPHILS NFR BLD AUTO: 0.5 % — SIGNIFICANT CHANGE UP (ref 0–2)
BILIRUB SERPL-MCNC: 0.3 MG/DL — SIGNIFICANT CHANGE UP (ref 0.2–1.2)
BUN SERPL-MCNC: 8 MG/DL — SIGNIFICANT CHANGE UP (ref 7–23)
CALCIUM SERPL-MCNC: 8.9 MG/DL — SIGNIFICANT CHANGE UP (ref 8.4–10.5)
CHLORIDE SERPL-SCNC: 103 MMOL/L — SIGNIFICANT CHANGE UP (ref 98–107)
CO2 SERPL-SCNC: 24 MMOL/L — SIGNIFICANT CHANGE UP (ref 22–31)
CREAT SERPL-MCNC: 1.25 MG/DL — SIGNIFICANT CHANGE UP (ref 0.5–1.3)
CRP SERPL-MCNC: 65.1 MG/L — HIGH
CULTURE RESULTS: SIGNIFICANT CHANGE UP
EGFR: 64 ML/MIN/1.73M2 — SIGNIFICANT CHANGE UP
EOSINOPHIL # BLD AUTO: 11.71 K/UL — HIGH (ref 0–0.5)
EOSINOPHIL NFR BLD AUTO: 71.7 % — HIGH (ref 0–6)
GLUCOSE SERPL-MCNC: 77 MG/DL — SIGNIFICANT CHANGE UP (ref 70–99)
HCT VFR BLD CALC: 39.1 % — SIGNIFICANT CHANGE UP (ref 39–50)
HGB BLD-MCNC: 12.6 G/DL — LOW (ref 13–17)
IANC: 2.39 K/UL — SIGNIFICANT CHANGE UP (ref 1.8–7.4)
IMM GRANULOCYTES NFR BLD AUTO: 1 % — HIGH (ref 0–0.9)
LYMPHOCYTES # BLD AUTO: 1.02 K/UL — SIGNIFICANT CHANGE UP (ref 1–3.3)
LYMPHOCYTES # BLD AUTO: 6.2 % — LOW (ref 13–44)
MAGNESIUM SERPL-MCNC: 2.5 MG/DL — SIGNIFICANT CHANGE UP (ref 1.6–2.6)
MCHC RBC-ENTMCNC: 30.6 PG — SIGNIFICANT CHANGE UP (ref 27–34)
MCHC RBC-ENTMCNC: 32.2 GM/DL — SIGNIFICANT CHANGE UP (ref 32–36)
MCV RBC AUTO: 94.9 FL — SIGNIFICANT CHANGE UP (ref 80–100)
MONOCYTES # BLD AUTO: 0.97 K/UL — HIGH (ref 0–0.9)
MONOCYTES NFR BLD AUTO: 5.9 % — SIGNIFICANT CHANGE UP (ref 2–14)
NEUTROPHILS # BLD AUTO: 2.39 K/UL — SIGNIFICANT CHANGE UP (ref 1.8–7.4)
NEUTROPHILS NFR BLD AUTO: 14.7 % — LOW (ref 43–77)
NRBC # BLD: 0 /100 WBCS — SIGNIFICANT CHANGE UP (ref 0–0)
NRBC # FLD: 0 K/UL — SIGNIFICANT CHANGE UP (ref 0–0)
PHOSPHATE SERPL-MCNC: 4.6 MG/DL — HIGH (ref 2.5–4.5)
PLATELET # BLD AUTO: 314 K/UL — SIGNIFICANT CHANGE UP (ref 150–400)
POTASSIUM SERPL-MCNC: 4.4 MMOL/L — SIGNIFICANT CHANGE UP (ref 3.5–5.3)
POTASSIUM SERPL-SCNC: 4.4 MMOL/L — SIGNIFICANT CHANGE UP (ref 3.5–5.3)
PROCALCITONIN SERPL-MCNC: 0.06 NG/ML — SIGNIFICANT CHANGE UP (ref 0.02–0.1)
PROT SERPL-MCNC: 6 G/DL — SIGNIFICANT CHANGE UP (ref 6–8.3)
RBC # BLD: 4.12 M/UL — LOW (ref 4.2–5.8)
RBC # FLD: 13.4 % — SIGNIFICANT CHANGE UP (ref 10.3–14.5)
SODIUM SERPL-SCNC: 142 MMOL/L — SIGNIFICANT CHANGE UP (ref 135–145)
TOXOCARA ANTIBODY IGG RESULT: 1 U — SIGNIFICANT CHANGE UP
WBC # BLD: 16.06 K/UL — HIGH (ref 3.8–10.5)
WBC # FLD AUTO: 16.06 K/UL — HIGH (ref 3.8–10.5)

## 2023-09-23 PROCEDURE — 99232 SBSQ HOSP IP/OBS MODERATE 35: CPT

## 2023-09-23 RX ADMIN — PIPERACILLIN AND TAZOBACTAM 25 GRAM(S): 4; .5 INJECTION, POWDER, LYOPHILIZED, FOR SOLUTION INTRAVENOUS at 14:24

## 2023-09-23 RX ADMIN — Medication 750 MILLIGRAM(S): at 22:15

## 2023-09-23 RX ADMIN — Medication 400 MILLIGRAM(S): at 18:55

## 2023-09-23 RX ADMIN — Medication 750 MILLIGRAM(S): at 06:10

## 2023-09-23 RX ADMIN — Medication 5 MILLIGRAM(S): at 14:25

## 2023-09-23 RX ADMIN — ENOXAPARIN SODIUM 40 MILLIGRAM(S): 100 INJECTION SUBCUTANEOUS at 22:15

## 2023-09-23 RX ADMIN — Medication 3 MILLIGRAM(S): at 22:16

## 2023-09-23 RX ADMIN — TAMSULOSIN HYDROCHLORIDE 0.4 MILLIGRAM(S): 0.4 CAPSULE ORAL at 22:15

## 2023-09-23 RX ADMIN — Medication 400 MILLIGRAM(S): at 06:10

## 2023-09-23 RX ADMIN — PIPERACILLIN AND TAZOBACTAM 25 GRAM(S): 4; .5 INJECTION, POWDER, LYOPHILIZED, FOR SOLUTION INTRAVENOUS at 06:10

## 2023-09-23 RX ADMIN — Medication 650 MILLIGRAM(S): at 18:55

## 2023-09-23 RX ADMIN — Medication 650 MILLIGRAM(S): at 19:25

## 2023-09-23 RX ADMIN — Medication 750 MILLIGRAM(S): at 14:25

## 2023-09-23 RX ADMIN — PIPERACILLIN AND TAZOBACTAM 25 GRAM(S): 4; .5 INJECTION, POWDER, LYOPHILIZED, FOR SOLUTION INTRAVENOUS at 22:18

## 2023-09-23 RX ADMIN — ENTECAVIR 0.5 MILLIGRAM(S): 0.5 TABLET ORAL at 14:25

## 2023-09-23 NOTE — PROGRESS NOTE ADULT - SUBJECTIVE AND OBJECTIVE BOX
Patient is a 63y old  Male who presents with a chief complaint of resp infection (22 Sep 2023 16:59)    Sinan Cormier MD   [facility] Division of Hospital Medicine   Pager [phone]  Reachable on [employer] Teams     SUBJECTIVE / OVERNIGHT EVENTS:  Patient seen and examined today. Coughing is improved.   No fevers chills.     MEDICATIONS  (STANDING):  acyclovir   Oral Tab/Cap 400 milliGRAM(s) Oral two times a day  enoxaparin Injectable 40 milliGRAM(s) SubCutaneous every 24 hours  entecavir 0.5 milliGRAM(s) Oral daily  metroNIDAZOLE    Tablet 750 milliGRAM(s) Oral three times a day  oxybutynin XL 5 milliGRAM(s) Oral daily  piperacillin/tazobactam IVPB.. 3.375 Gram(s) IV Intermittent every 8 hours  tamsulosin 0.4 milliGRAM(s) Oral at bedtime    MEDICATIONS  (PRN):  acetaminophen     Tablet .. 650 milliGRAM(s) Oral every 6 hours PRN Temp greater or equal to 38C (100.4F), Mild Pain (1 - 3)  albuterol/ipratropium for Nebulization 3 milliLiter(s) Nebulizer every 6 hours PRN Bronchospasm  benzonatate 100 milliGRAM(s) Oral every 8 hours PRN Cough  melatonin 3 milliGRAM(s) Oral at bedtime PRN Insomnia      Vital Signs Last 24 Hrs  T(C): 36.7 (23 Sep 2023 10:28), Max: 37.8 (22 Sep 2023 17:00)  T(F): 98 (23 Sep 2023 10:28), Max: 100.1 (22 Sep 2023 17:00)  HR: 79 (23 Sep 2023 10:28) (70 - 100)  BP: 98/56 (23 Sep 2023 10:28) (98/56 - 111/69)  BP(mean): --  RR: 17 (23 Sep 2023 10:28) (17 - 18)  SpO2: 96% (23 Sep 2023 10:28) (95% - 97%)  CAPILLARY BLOOD GLUCOSE        I&O's Summary    22 Sep 2023 07:01  -  23 Sep 2023 07:00  --------------------------------------------------------  IN: 950 mL / OUT: 0 mL / NET: 950 mL    23 Sep 2023 07:01  -  23 Sep 2023 14:42  --------------------------------------------------------  IN: 240 mL / OUT: 0 mL / NET: 240 mL      General: man laying down in bed appears comfortable in NAD, awake and alert  HENMT: MMM   Respiratory: No respiratory distress, decreased breath sounds in the bases, No wheezing.  Cardiovascular: S1,S2; No m/g/r.  Gastrointestinal: Soft, Nontender, Nondistended; +BS.   Extremities: No c/c/e; warm to touch  Skin: No rashes, No erythema   Psych: appropriate mood and affect    LABS:                        12.6   16.06 )-----------( 314      ( 23 Sep 2023 07:00 )             39.1     09-23    142  |  103  |  8   ----------------------------<  77  4.4   |  24  |  1.25    Ca    8.9      23 Sep 2023 07:00  Phos  4.6     09-23  Mg     2.50     09-23    TPro  6.0  /  Alb  3.0<L>  /  TBili  0.3  /  DBili  x   /  AST  14  /  ALT  31  /  AlkPhos  181<H>  09-23          Urinalysis Basic - ( 23 Sep 2023 07:00 )    Color: x / Appearance: x / SG: x / pH: x  Gluc: 77 mg/dL / Ketone: x  / Bili: x / Urobili: x   Blood: x / Protein: x / Nitrite: x   Leuk Esterase: x / RBC: x / WBC x   Sq Epi: x / Non Sq Epi: x / Bacteria: x        RADIOLOGY & ADDITIONAL TESTS:    Imaging Personally Reviewed:    Consultant(s) Notes Reviewed:      Care Discussed with Consultants/Other Providers:

## 2023-09-24 LAB
ALBUMIN SERPL ELPH-MCNC: 3.3 G/DL — SIGNIFICANT CHANGE UP (ref 3.3–5)
ALP SERPL-CCNC: 165 U/L — HIGH (ref 40–120)
ALT FLD-CCNC: 23 U/L — SIGNIFICANT CHANGE UP (ref 4–41)
ANION GAP SERPL CALC-SCNC: 11 MMOL/L — SIGNIFICANT CHANGE UP (ref 7–14)
AST SERPL-CCNC: 11 U/L — SIGNIFICANT CHANGE UP (ref 4–40)
BASOPHILS # BLD AUTO: 0.07 K/UL — SIGNIFICANT CHANGE UP (ref 0–0.2)
BASOPHILS NFR BLD AUTO: 0.5 % — SIGNIFICANT CHANGE UP (ref 0–2)
BILIRUB SERPL-MCNC: <0.2 MG/DL — SIGNIFICANT CHANGE UP (ref 0.2–1.2)
BUN SERPL-MCNC: 8 MG/DL — SIGNIFICANT CHANGE UP (ref 7–23)
C IMMITIS AB SER QL IA: NEGATIVE — SIGNIFICANT CHANGE UP
CALCIUM SERPL-MCNC: 9.1 MG/DL — SIGNIFICANT CHANGE UP (ref 8.4–10.5)
CHLORIDE SERPL-SCNC: 103 MMOL/L — SIGNIFICANT CHANGE UP (ref 98–107)
CO2 SERPL-SCNC: 27 MMOL/L — SIGNIFICANT CHANGE UP (ref 22–31)
CREAT SERPL-MCNC: 1.18 MG/DL — SIGNIFICANT CHANGE UP (ref 0.5–1.3)
EGFR: 68 ML/MIN/1.73M2 — SIGNIFICANT CHANGE UP
EOSINOPHIL # BLD AUTO: 11.16 K/UL — HIGH (ref 0–0.5)
EOSINOPHIL NFR BLD AUTO: 74.4 % — HIGH (ref 0–6)
ERYTHROCYTE [SEDIMENTATION RATE] IN BLOOD: 70 MM/HR — HIGH (ref 1–15)
GLUCOSE SERPL-MCNC: 107 MG/DL — HIGH (ref 70–99)
H CAPSUL AG SPEC-ACNC: SIGNIFICANT CHANGE UP
H CAPSUL AG UR IA-ACNC: SIGNIFICANT CHANGE UP NG/ML
H CAPSUL AG UR QL IA: SIGNIFICANT CHANGE UP
HCT VFR BLD CALC: 41.2 % — SIGNIFICANT CHANGE UP (ref 39–50)
HGB BLD-MCNC: 13.2 G/DL — SIGNIFICANT CHANGE UP (ref 13–17)
IANC: 2.18 K/UL — SIGNIFICANT CHANGE UP (ref 1.8–7.4)
IGE SERPL-ACNC: <2 KU/L — SIGNIFICANT CHANGE UP
IMM GRANULOCYTES NFR BLD AUTO: 1.2 % — HIGH (ref 0–0.9)
LYMPHOCYTES # BLD AUTO: 0.76 K/UL — LOW (ref 1–3.3)
LYMPHOCYTES # BLD AUTO: 5.1 % — LOW (ref 13–44)
MAGNESIUM SERPL-MCNC: 2.4 MG/DL — SIGNIFICANT CHANGE UP (ref 1.6–2.6)
MCHC RBC-ENTMCNC: 30.1 PG — SIGNIFICANT CHANGE UP (ref 27–34)
MCHC RBC-ENTMCNC: 32 GM/DL — SIGNIFICANT CHANGE UP (ref 32–36)
MCV RBC AUTO: 93.8 FL — SIGNIFICANT CHANGE UP (ref 80–100)
MONOCYTES # BLD AUTO: 0.64 K/UL — SIGNIFICANT CHANGE UP (ref 0–0.9)
MONOCYTES NFR BLD AUTO: 4.3 % — SIGNIFICANT CHANGE UP (ref 2–14)
NEUTROPHILS # BLD AUTO: 2.18 K/UL — SIGNIFICANT CHANGE UP (ref 1.8–7.4)
NEUTROPHILS NFR BLD AUTO: 14.5 % — LOW (ref 43–77)
NRBC # BLD: 0 /100 WBCS — SIGNIFICANT CHANGE UP (ref 0–0)
NRBC # FLD: 0 K/UL — SIGNIFICANT CHANGE UP (ref 0–0)
PHOSPHATE SERPL-MCNC: 3.6 MG/DL — SIGNIFICANT CHANGE UP (ref 2.5–4.5)
PLATELET # BLD AUTO: 337 K/UL — SIGNIFICANT CHANGE UP (ref 150–400)
POTASSIUM SERPL-MCNC: 3.8 MMOL/L — SIGNIFICANT CHANGE UP (ref 3.5–5.3)
POTASSIUM SERPL-SCNC: 3.8 MMOL/L — SIGNIFICANT CHANGE UP (ref 3.5–5.3)
PROT SERPL-MCNC: 6.3 G/DL — SIGNIFICANT CHANGE UP (ref 6–8.3)
RBC # BLD: 4.39 M/UL — SIGNIFICANT CHANGE UP (ref 4.2–5.8)
RBC # FLD: 13.4 % — SIGNIFICANT CHANGE UP (ref 10.3–14.5)
SODIUM SERPL-SCNC: 141 MMOL/L — SIGNIFICANT CHANGE UP (ref 135–145)
TRYPTASE SERPL-MCNC: 10.2 UG/L — HIGH
WBC # BLD: 14.99 K/UL — HIGH (ref 3.8–10.5)
WBC # FLD AUTO: 14.99 K/UL — HIGH (ref 3.8–10.5)

## 2023-09-24 PROCEDURE — 99232 SBSQ HOSP IP/OBS MODERATE 35: CPT

## 2023-09-24 RX ADMIN — Medication 3 MILLIGRAM(S): at 22:01

## 2023-09-24 RX ADMIN — Medication 750 MILLIGRAM(S): at 06:29

## 2023-09-24 RX ADMIN — Medication 400 MILLIGRAM(S): at 06:29

## 2023-09-24 RX ADMIN — Medication 5 MILLIGRAM(S): at 13:26

## 2023-09-24 RX ADMIN — Medication 750 MILLIGRAM(S): at 13:26

## 2023-09-24 RX ADMIN — PIPERACILLIN AND TAZOBACTAM 25 GRAM(S): 4; .5 INJECTION, POWDER, LYOPHILIZED, FOR SOLUTION INTRAVENOUS at 13:27

## 2023-09-24 RX ADMIN — PIPERACILLIN AND TAZOBACTAM 25 GRAM(S): 4; .5 INJECTION, POWDER, LYOPHILIZED, FOR SOLUTION INTRAVENOUS at 22:01

## 2023-09-24 RX ADMIN — Medication 750 MILLIGRAM(S): at 22:02

## 2023-09-24 RX ADMIN — TAMSULOSIN HYDROCHLORIDE 0.4 MILLIGRAM(S): 0.4 CAPSULE ORAL at 22:01

## 2023-09-24 RX ADMIN — PIPERACILLIN AND TAZOBACTAM 25 GRAM(S): 4; .5 INJECTION, POWDER, LYOPHILIZED, FOR SOLUTION INTRAVENOUS at 06:35

## 2023-09-24 RX ADMIN — Medication 400 MILLIGRAM(S): at 19:40

## 2023-09-24 RX ADMIN — ENOXAPARIN SODIUM 40 MILLIGRAM(S): 100 INJECTION SUBCUTANEOUS at 22:01

## 2023-09-24 RX ADMIN — ENTECAVIR 0.5 MILLIGRAM(S): 0.5 TABLET ORAL at 13:26

## 2023-09-24 NOTE — PROGRESS NOTE ADULT - SUBJECTIVE AND OBJECTIVE BOX
Patient is a 63y old  Male who presents with a chief complaint of resp infection (23 Sep 2023 14:40)    Sinan Cormier MD   [facility] Division of Hospital Medicine   Pager [phone]  Reachable on [employer] Teams     SUBJECTIVE / OVERNIGHT EVENTS:  Patient seen and examined this morning. Feeling nauseous had episdoe of dry heaving. .    MEDICATIONS  (STANDING):  acyclovir   Oral Tab/Cap 400 milliGRAM(s) Oral two times a day  enoxaparin Injectable 40 milliGRAM(s) SubCutaneous every 24 hours  entecavir 0.5 milliGRAM(s) Oral daily  metroNIDAZOLE    Tablet 750 milliGRAM(s) Oral three times a day  oxybutynin XL 5 milliGRAM(s) Oral daily  piperacillin/tazobactam IVPB.. 3.375 Gram(s) IV Intermittent every 8 hours  tamsulosin 0.4 milliGRAM(s) Oral at bedtime    MEDICATIONS  (PRN):  acetaminophen     Tablet .. 650 milliGRAM(s) Oral every 6 hours PRN Temp greater or equal to 38C (100.4F), Mild Pain (1 - 3)  albuterol/ipratropium for Nebulization 3 milliLiter(s) Nebulizer every 6 hours PRN Bronchospasm  benzonatate 100 milliGRAM(s) Oral every 8 hours PRN Cough  melatonin 3 milliGRAM(s) Oral at bedtime PRN Insomnia      Vital Signs Last 24 Hrs  T(C): 36.9 (24 Sep 2023 10:00), Max: 36.9 (23 Sep 2023 21:25)  T(F): 98.4 (24 Sep 2023 10:00), Max: 98.5 (23 Sep 2023 21:25)  HR: 95 (24 Sep 2023 10:00) (91 - 97)  BP: 101/67 (24 Sep 2023 10:00) (98/67 - 124/94)  BP(mean): --  RR: 17 (24 Sep 2023 10:00) (16 - 17)  SpO2: 96% (24 Sep 2023 10:00) (91% - 99%)  CAPILLARY BLOOD GLUCOSE        I&O's Summary    23 Sep 2023 07:01  -  24 Sep 2023 07:00  --------------------------------------------------------  IN: 960 mL / OUT: 0 mL / NET: 960 mL        General: man laying down in bed appears comfortable in NAD, awake and alert  HENMT: MMM   Respiratory: No respiratory distress, decreased breath sounds in the bases, No wheezing.  Cardiovascular: S1,S2; No m/g/r.  Gastrointestinal: Soft, Nontender, Nondistended; +BS.   Extremities: No c/c/e; warm to touch  Skin: No rashes, No erythema   Psych: appropriate mood and affect    LABS:                        13.2   14.99 )-----------( 337      ( 24 Sep 2023 08:30 )             41.2     09-24    141  |  103  |  8   ----------------------------<  107<H>  3.8   |  27  |  1.18    Ca    9.1      24 Sep 2023 08:30  Phos  3.6     09-24  Mg     2.40     09-24    TPro  6.3  /  Alb  3.3  /  TBili  <0.2  /  DBili  x   /  AST  11  /  ALT  23  /  AlkPhos  165<H>  09-24          Urinalysis Basic - ( 24 Sep 2023 08:30 )    Color: x / Appearance: x / SG: x / pH: x  Gluc: 107 mg/dL / Ketone: x  / Bili: x / Urobili: x   Blood: x / Protein: x / Nitrite: x   Leuk Esterase: x / RBC: x / WBC x   Sq Epi: x / Non Sq Epi: x / Bacteria: x        RADIOLOGY & ADDITIONAL TESTS:    Imaging Personally Reviewed:    Consultant(s) Notes Reviewed:      Care Discussed with Consultants/Other Providers:

## 2023-09-25 LAB
ALBUMIN SERPL ELPH-MCNC: 3.2 G/DL — LOW (ref 3.3–5)
ALP SERPL-CCNC: 146 U/L — HIGH (ref 40–120)
ALT FLD-CCNC: 21 U/L — SIGNIFICANT CHANGE UP (ref 4–41)
ANION GAP SERPL CALC-SCNC: 9 MMOL/L — SIGNIFICANT CHANGE UP (ref 7–14)
AST SERPL-CCNC: 14 U/L — SIGNIFICANT CHANGE UP (ref 4–40)
BASOPHILS # BLD AUTO: 0.08 K/UL — SIGNIFICANT CHANGE UP (ref 0–0.2)
BASOPHILS NFR BLD AUTO: 0.5 % — SIGNIFICANT CHANGE UP (ref 0–2)
BILIRUB SERPL-MCNC: <0.2 MG/DL — SIGNIFICANT CHANGE UP (ref 0.2–1.2)
BUN SERPL-MCNC: 9 MG/DL — SIGNIFICANT CHANGE UP (ref 7–23)
CALCIUM SERPL-MCNC: 9.1 MG/DL — SIGNIFICANT CHANGE UP (ref 8.4–10.5)
CHLORIDE SERPL-SCNC: 104 MMOL/L — SIGNIFICANT CHANGE UP (ref 98–107)
CO2 SERPL-SCNC: 26 MMOL/L — SIGNIFICANT CHANGE UP (ref 22–31)
CREAT SERPL-MCNC: 1.13 MG/DL — SIGNIFICANT CHANGE UP (ref 0.5–1.3)
CULTURE RESULTS: SIGNIFICANT CHANGE UP
CULTURE RESULTS: SIGNIFICANT CHANGE UP
EGFR: 72 ML/MIN/1.73M2 — SIGNIFICANT CHANGE UP
EOSINOPHIL # BLD AUTO: 11.6 K/UL — HIGH (ref 0–0.5)
EOSINOPHIL NFR BLD AUTO: 72 % — HIGH (ref 0–6)
GLUCOSE SERPL-MCNC: 120 MG/DL — HIGH (ref 70–99)
HBV DNA # SERPL NAA+PROBE: SIGNIFICANT CHANGE UP
HBV DNA SERPL NAA+PROBE-LOG#: SIGNIFICANT CHANGE UP LOGIU/ML
HCT VFR BLD CALC: 40 % — SIGNIFICANT CHANGE UP (ref 39–50)
HGB BLD-MCNC: 12.8 G/DL — LOW (ref 13–17)
IANC: 2.47 K/UL — SIGNIFICANT CHANGE UP (ref 1.8–7.4)
IMM GRANULOCYTES NFR BLD AUTO: 1.3 % — HIGH (ref 0–0.9)
LYMPHOCYTES # BLD AUTO: 1.01 K/UL — SIGNIFICANT CHANGE UP (ref 1–3.3)
LYMPHOCYTES # BLD AUTO: 6.3 % — LOW (ref 13–44)
MAGNESIUM SERPL-MCNC: 2.4 MG/DL — SIGNIFICANT CHANGE UP (ref 1.6–2.6)
MCHC RBC-ENTMCNC: 29.7 PG — SIGNIFICANT CHANGE UP (ref 27–34)
MCHC RBC-ENTMCNC: 32 GM/DL — SIGNIFICANT CHANGE UP (ref 32–36)
MCV RBC AUTO: 92.8 FL — SIGNIFICANT CHANGE UP (ref 80–100)
MONOCYTES # BLD AUTO: 0.74 K/UL — SIGNIFICANT CHANGE UP (ref 0–0.9)
MONOCYTES NFR BLD AUTO: 4.6 % — SIGNIFICANT CHANGE UP (ref 2–14)
NEUTROPHILS # BLD AUTO: 2.47 K/UL — SIGNIFICANT CHANGE UP (ref 1.8–7.4)
NEUTROPHILS NFR BLD AUTO: 15.3 % — LOW (ref 43–77)
NRBC # BLD: 0 /100 WBCS — SIGNIFICANT CHANGE UP (ref 0–0)
NRBC # FLD: 0 K/UL — SIGNIFICANT CHANGE UP (ref 0–0)
PHOSPHATE SERPL-MCNC: 3.6 MG/DL — SIGNIFICANT CHANGE UP (ref 2.5–4.5)
PLATELET # BLD AUTO: 341 K/UL — SIGNIFICANT CHANGE UP (ref 150–400)
POTASSIUM SERPL-MCNC: 3.7 MMOL/L — SIGNIFICANT CHANGE UP (ref 3.5–5.3)
POTASSIUM SERPL-SCNC: 3.7 MMOL/L — SIGNIFICANT CHANGE UP (ref 3.5–5.3)
PROT SERPL-MCNC: 6.2 G/DL — SIGNIFICANT CHANGE UP (ref 6–8.3)
RBC # BLD: 4.31 M/UL — SIGNIFICANT CHANGE UP (ref 4.2–5.8)
RBC # FLD: 13.4 % — SIGNIFICANT CHANGE UP (ref 10.3–14.5)
SODIUM SERPL-SCNC: 139 MMOL/L — SIGNIFICANT CHANGE UP (ref 135–145)
SPECIMEN SOURCE: SIGNIFICANT CHANGE UP
SPECIMEN SOURCE: SIGNIFICANT CHANGE UP
STRONGYLOIDES AB SER-ACNC: NEGATIVE — SIGNIFICANT CHANGE UP
TM INTERPRETATION: SIGNIFICANT CHANGE UP
WBC # BLD: 16.11 K/UL — HIGH (ref 3.8–10.5)
WBC # FLD AUTO: 16.11 K/UL — HIGH (ref 3.8–10.5)

## 2023-09-25 PROCEDURE — 99223 1ST HOSP IP/OBS HIGH 75: CPT | Mod: GC

## 2023-09-25 PROCEDURE — 99497 ADVNCD CARE PLAN 30 MIN: CPT | Mod: 25

## 2023-09-25 PROCEDURE — 99232 SBSQ HOSP IP/OBS MODERATE 35: CPT

## 2023-09-25 PROCEDURE — 99223 1ST HOSP IP/OBS HIGH 75: CPT

## 2023-09-25 RX ORDER — ONDANSETRON 8 MG/1
4 TABLET, FILM COATED ORAL ONCE
Refills: 0 | Status: COMPLETED | OUTPATIENT
Start: 2023-09-25 | End: 2023-09-25

## 2023-09-25 RX ADMIN — ENOXAPARIN SODIUM 40 MILLIGRAM(S): 100 INJECTION SUBCUTANEOUS at 22:37

## 2023-09-25 RX ADMIN — Medication 5 MILLIGRAM(S): at 14:59

## 2023-09-25 RX ADMIN — ENTECAVIR 0.5 MILLIGRAM(S): 0.5 TABLET ORAL at 14:59

## 2023-09-25 RX ADMIN — Medication 400 MILLIGRAM(S): at 19:02

## 2023-09-25 RX ADMIN — ONDANSETRON 4 MILLIGRAM(S): 8 TABLET, FILM COATED ORAL at 14:58

## 2023-09-25 RX ADMIN — Medication 750 MILLIGRAM(S): at 15:00

## 2023-09-25 RX ADMIN — Medication 400 MILLIGRAM(S): at 07:31

## 2023-09-25 RX ADMIN — Medication 100 MILLIGRAM(S): at 15:53

## 2023-09-25 RX ADMIN — PIPERACILLIN AND TAZOBACTAM 25 GRAM(S): 4; .5 INJECTION, POWDER, LYOPHILIZED, FOR SOLUTION INTRAVENOUS at 22:37

## 2023-09-25 RX ADMIN — TAMSULOSIN HYDROCHLORIDE 0.4 MILLIGRAM(S): 0.4 CAPSULE ORAL at 22:37

## 2023-09-25 RX ADMIN — Medication 750 MILLIGRAM(S): at 22:37

## 2023-09-25 RX ADMIN — PIPERACILLIN AND TAZOBACTAM 25 GRAM(S): 4; .5 INJECTION, POWDER, LYOPHILIZED, FOR SOLUTION INTRAVENOUS at 14:58

## 2023-09-25 RX ADMIN — Medication 750 MILLIGRAM(S): at 07:30

## 2023-09-25 RX ADMIN — PIPERACILLIN AND TAZOBACTAM 25 GRAM(S): 4; .5 INJECTION, POWDER, LYOPHILIZED, FOR SOLUTION INTRAVENOUS at 07:30

## 2023-09-25 NOTE — CONSULT NOTE ADULT - SUBJECTIVE AND OBJECTIVE BOX
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 Symptoms began before going to [facility] improved there with abx, but did not completely resolve. Now complaining of productive cough, chest pain when coughing, nausea, vomiting, loose stool, anorexia, weakness, insomnia for cough. On presentation found to be febrile with leukocytosis, + Rhinovirus on RVP. Patient currently admitted for pneumonia, in the setting of viral infection or possibly eosinophilic pna. Patient has been evaluated by ID and pulmonary teams, currently on Zosyn/Flagyl/entecavir and TB ruled out. Patient requiring supplemental oxygen.     Palliative care consulted for symptoms/goals of care given NHL.     Met with patient at bedside. His wife Karina, son Narendra and his brother were also present. Patient shares that he feels that since coming to the hospital, he has improved slightly and symptoms are not getting worse. He shared that symptoms of cough and shortness of breath began before he left for [facility], and progressed there and even with antibiotics treatment did not improve. As such last Tuesday he presented to the hospital after returning from [facility] the day prior.    He shared that prior to current illness he was able to ambulate and perform ADLs without assitance. He works as an .  Currently he is able to ambulate to the bathroom without assistance and without supplemental oxygen.  He shared that he has some nausea with seeing food and earlier today had some thick secretions but no emesis which improved with antiemetics. He does not like the hospital food very much or tolerate it very well. He was able to eat some bland rice porridge that his wife brought from home. I encouraged him to eat food from home if he's able to tolerate it.   He is having BMs. He endorsed feeling fatigued and is hoping to improve.  Regarding his cancer he shared that he's been following oncologist Dr. Alondra Mcgill and shared his appreciation for the excellent care he's received from his oncology team.  He became tearful in discussing his disease history but remains steadfast in his belief in God. He also has a good support system at home -- lives with wife and two adult children at [place].  He's originally from [facility] but also spent many years in [place] prior to immigration to the .     PERTINENT PM/SXH:   Non Hodgkin's lymphoma    Hepatitis B      No significant past surgical history    History of cholecystectomy      FAMILY HISTORY:  FH: lymphoma (Mother)      Family Hx substance abuse [ ]yes [ ]no  ITEMS NOT CHECKED ARE NOT PRESENT    SOCIAL HISTORY:   Significant other/partner[ ]  Children[ ]  Orthodox/Spirituality:  Substance hx:  [ ]   Tobacco hx:  [ ]   Alcohol hx: [ ]   Home Opioid hx:  [ ] I-Stop Reference No:  Living Situation: [ ]Home  [ ]Long term care  [ ]Rehab [ ]Other    ADVANCE DIRECTIVES:    DNR/MOLST  [ ]  Living Will  [ ]   DECISION MAKER(s):  [ ] Health Care Proxy(s)  [ ] Surrogate(s)  [ ] Guardian           Name(s): Phone Number(s):    BASELINE (I)ADL(s) (prior to admission):  [place]: [ ]Total  [ ] Moderate [ ]Dependent    Allergies    No Known Allergies    Intolerances    MEDICATIONS  (STANDING):  acyclovir   Oral Tab/Cap 400 milliGRAM(s) Oral two times a day  enoxaparin Injectable 40 milliGRAM(s) SubCutaneous every 24 hours  entecavir 0.5 milliGRAM(s) Oral daily  metroNIDAZOLE    Tablet 750 milliGRAM(s) Oral three times a day  oxybutynin XL 5 milliGRAM(s) Oral daily  piperacillin/tazobactam IVPB.. 3.375 Gram(s) IV Intermittent every 8 hours  tamsulosin 0.4 milliGRAM(s) Oral at bedtime    MEDICATIONS  (PRN):  acetaminophen     Tablet .. 650 milliGRAM(s) Oral every 6 hours PRN Temp greater or equal to 38C (100.4F), Mild Pain (1 - 3)  albuterol/ipratropium for Nebulization 3 milliLiter(s) Nebulizer every 6 hours PRN Bronchospasm  benzonatate 100 milliGRAM(s) Oral every 8 hours PRN Cough  melatonin 3 milliGRAM(s) Oral at bedtime PRN Insomnia    PRESENT SYMPTOMS: [ ]Unable to self-report  [ ] CPOT [ ] PAINADs [ ] RDOS  Source if other than patient:  [ ]Family   [ ]Team     Pain: [ ]yes [ ]no  QOL impact -   Location -                    Aggravating factors -  Quality -  Radiation -  Timing-  Severity (0-10 scale):  Minimal acceptable level/pain goal (0-10 scale):     CPOT:    https://www.[facility].org/getattachment/[email or web address]/Critical-Care-Pain-Observation-Tool-(CPOT)    Dyspnea:                           [ ]Mild [ ]Moderate [ ]Severe  Anxiety:                             [ ]Mild [ ]Moderate [ ]Severe  Fatigue:                             [ ]Mild [ ]Moderate [ ]Severe  Nausea:                             [ ]Mild [ ]Moderate [ ]Severe  Loss of appetite:              [ ]Mild [ ]Moderate [ ]Severe  Constipation:                    [ ]Mild [ ]Moderate [ ]Severe    Other Symptoms:  [ ]All other review of systems negative     PCSSQ[Palliative Care Spiritual Screening Question]   Severity (0-10):  Chaplaincy Referral: [ ] yes [ ] refused [ ] following [ ] deferred     Caregiver [place]? : [ ] yes [ ] no [ ] Deferred [ ] Declined             Social work referral [ ] Patient & Family Centered Care Referral [ ]  Anticipatory Grief present?:  [ ] yes [ ] no  [ ] Deferred                  Social work referral [ ] Patient & Family Centered Care Referral [ ]    PHYSICAL EXAM:  Vital Signs Last 24 Hrs  T(C): 37.1 (25 Sep 2023 18:04), Max: 37.1 (25 Sep 2023 18:04)  T(F): 98.8 (25 Sep 2023 18:04), Max: 98.8 (25 Sep 2023 18:04)  HR: 88 (25 Sep 2023 18:04) (72 - 99)  BP: 110/72 (25 Sep 2023 18:04) (107/69 - 114/71)  BP(mean): --  RR: 17 (25 Sep 2023 18:04) (17 - 18)  SpO2: 95% (25 Sep 2023 18:04) (94% - 98%)    Parameters below as of 25 Sep 2023 18:04  Patient On (Oxygen Delivery Method): nasal cannula     I&O's Summary    24 Sep 2023 07:01  -  25 Sep 2023 07:00  --------------------------------------------------------  IN: 720 mL / OUT: 380 mL / NET: 340 mL      GENERAL: [ ]Cachexia    [ ]Alert  [ ]Oriented x   [ ]Lethargic  [ ]Unarousable  [ ]Verbal  [ ]Non-Verbal  Behavioral:   [ ] Anxiety  [ ] Delirium [ ] Agitation [ ] Other  HEENT:  [ ]Normal   [ ]Dry mouth   [ ]ET Tube/Trach  [ ]Oral lesions  PULMONARY:   [ ]Clear [ ]Tachypnea  [ ]Audible excessive secretions   [ ]Rhonchi        [ ]Right [ ]Left [ ]Bilateral  [ ]Crackles        [ ]Right [ ]Left [ ]Bilateral  [ ]Wheezing     [ ]Right [ ]Left [ ]Bilateral  [ ]Diminished breath sounds [ ]right [ ]left [ ]bilateral  CARDIOVASCULAR:    [ ]Regular [ ]Irregular [ ]Tachy  [ ]Costa [ ]Murmur [ ]Other  GASTROINTESTINAL:  [ ]Soft  [ ]Distended   [ ]+BS  [ ]Non tender [ ]Tender  [ ]Other [ ]PEG [ ]OGT/ NGT  Last BM:  GENITOURINARY:  [ ]Normal [ ] Incontinent   [ ]Oliguria/Anuria   [ ]Alvarez  MUSCULOSKELETAL:   [ ]Normal   [ ]Weakness  [ ]Bed/Wheelchair bound [ ]Edema  NEUROLOGIC:   [ ]No focal deficits  [ ]Cognitive impairment  [ ]Dysphagia [ ]Dysarthria [ ]Paresis [ ]Other   SKIN: Please see flowsheets   [ ]Normal  [ ]Rash  [ ]Other  [ ]Pressure ulcer(s)       Present on admission [ ]y [ ]n    CRITICAL CARE:  [ ] Shock Present  [ ]Septic [ ]Cardiogenic [ ]Neurologic [ ]Hypovolemic  [ ]  Vasopressors [ ]  Inotropes   [ ]Respiratory failure present [ ]Mechanical ventilation [ ]Non-invasive ventilatory support [ ]High flow    [ ]Acute  [ ]Chronic [ ]Hypoxic  [ ]Hypercarbic [ ]Other  [ ]Other organ failure     LABS:                        12.8   16.11 )-----------( 341      ( 25 Sep 2023 06:21 )             40.0   09-25    139  |  104  |  9   ----------------------------<  120<H>  3.7   |  26  |  1.13    Ca    9.1      25 Sep 2023 06:21  Phos  3.6     09-25  Mg     2.40     09-25    TPro  6.2  /  Alb  3.2<L>  /  TBili  <0.2  /  DBili  x   /  AST  14  /  ALT  21  /  AlkPhos  146<H>  09-25      Urinalysis Basic - ( 25 Sep 2023 06:21 )    Color: x / Appearance: x / SG: x / pH: x  Gluc: 120 mg/dL / Ketone: x  / Bili: x / Urobili: x   Blood: x / Protein: x / Nitrite: x   Leuk Esterase: x / RBC: x / WBC x   Sq Epi: x / Non Sq Epi: x / Bacteria: x      RADIOLOGY & ADDITIONAL STUDIES:    PROTEIN CALORIE MALNUTRITION PRESENT: [ ]mild [ ]moderate [ ]severe [ ]underweight [ ]morbid obesity  https://www.andeal.org/vault/2440/web/files/ONC/Table_Clinical%20Characteristics%20to%20Document%20Malnutrition-White%20JV%20et%20al%202012.pdf    Height (cm): 170.2 (10-29-22 @ 03:08)  Weight (kg): 71.3 (04-14-23 @ 21:00), 63.9 (10-29-22 @ 03:08)  BMI (kg/m2): 24.6 (04-14-23 @ 21:00), 22.1 (10-29-22 @ 03:08)    [ ]PPSV2 < or = to 30% [ ]significant weight loss  [ ]poor nutritional intake  [ ]anasarca[ ]Artificial Nutrition      Other REFERRALS:  [ ]Hospice  [ ]Child Life  [ ]Social Work  [ ]Case management [ ]Holistic Therapy     Goals of Care Document:  [facility]iatric[facility] and Palliative [facility]  Davon Morrison Palliative Care Attending  Contact Info: Page [phone] (including Nights/Weekends), message on Microsoft Teams (Davon Morrison), or leave VM at Palliative Office [phone] (non-urgent)     Date of [id number] @ 18:12  HPI:  63M, Irish-speaking, with hx of 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 Symptoms began before going to [facility] improved there with abx, but did not completely resolve. Now complaining of productive cough, chest pain when coughing, nausea, vomiting, loose stool, anorexia, weakness, insomnia for cough. On presentation found to be febrile with leukocytosis, + Rhinovirus on RVP. Patient currently admitted for pneumonia, in the setting of viral infection or possibly eosinophilic pna. Patient has been evaluated by ID and pulmonary teams, currently on Zosyn/Flagyl/entecavir and TB ruled out. Patient requiring supplemental oxygen.     Palliative care consulted for symptoms/goals of care given NHL.     Met with patient at bedside. His wife Karina, son Narendra and his brother were also present. Patient shares that he feels that since coming to the hospital, he has improved slightly and symptoms are not getting worse. He shared that symptoms of cough and shortness of breath began before he left for [facility], and progressed there and even with antibiotics treatment did not improve. As such last Tuesday he presented to the hospital after returning from [facility] the day prior.    He shared that prior to current illness he was able to ambulate and perform ADLs without assitance. He works as an .  Currently he is able to ambulate to the bathroom without assistance and without supplemental oxygen.  He shared that he has some nausea with seeing food and earlier today had some thick secretions but no emesis which improved with antiemetics. He does not like the hospital food very much or tolerate it very well. He was able to eat some bland rice porridge that his wife brought from home. I encouraged him to eat food from home if he's able to tolerate it.   He is having BMs. He endorsed feeling fatigued and is hoping to improve.  Regarding his cancer he shared that he's been following oncologist Dr. Alondra Mcgill and shared his appreciation for the excellent care he's received from his oncology team.  He became tearful in discussing his disease history but remains steadfast in his belief in God. He also has a good support system at home -- lives with wife and two adult children at [place].  He's originally from [facility] but also spent many years in [place] prior to immigration to the .     PERTINENT PM/SXH:   Non Hodgkin's lymphoma    Hepatitis B      No significant past surgical history    History of cholecystectomy      FAMILY HISTORY:  FH: lymphoma (Mother)      Family Hx substance abuse [ ]yes [ ]no  ITEMS NOT CHECKED ARE NOT PRESENT    SOCIAL HISTORY:   Significant other/partner[ ]  Children[ ]  Congregational/Spirituality:  Substance hx:  [ ]   Tobacco hx:  [ ]   Alcohol hx: [ ]   Home Opioid hx:  [ ] I-Stop Reference No:  Living Situation: [ ]Home  [ ]Long term care  [ ]Rehab [ ]Other    ADVANCE DIRECTIVES:    DNR/MOLST  [ ]  Living Will  [ ]   DECISION MAKER(s):  [ ] Health Care Proxy(s)  [ ] Surrogate(s)  [ ] Guardian           Name(s): Phone Number(s):    BASELINE (I)ADL(s) (prior to admission):  [place]: [ ]Total  [ ] Moderate [ ]Dependent    Allergies    No Known Allergies    Intolerances    MEDICATIONS  (STANDING):  acyclovir   Oral Tab/Cap 400 milliGRAM(s) Oral two times a day  enoxaparin Injectable 40 milliGRAM(s) SubCutaneous every 24 hours  entecavir 0.5 milliGRAM(s) Oral daily  metroNIDAZOLE    Tablet 750 milliGRAM(s) Oral three times a day  oxybutynin XL 5 milliGRAM(s) Oral daily  piperacillin/tazobactam IVPB.. 3.375 Gram(s) IV Intermittent every 8 hours  tamsulosin 0.4 milliGRAM(s) Oral at bedtime    MEDICATIONS  (PRN):  acetaminophen     Tablet .. 650 milliGRAM(s) Oral every 6 hours PRN Temp greater or equal to 38C (100.4F), Mild Pain (1 - 3)  albuterol/ipratropium for Nebulization 3 milliLiter(s) Nebulizer every 6 hours PRN Bronchospasm  benzonatate 100 milliGRAM(s) Oral every 8 hours PRN Cough  melatonin 3 milliGRAM(s) Oral at bedtime PRN Insomnia    PRESENT SYMPTOMS: [ ]Unable to self-report  [ ] CPOT [ ] PAINADs [ ] RDOS  Source if other than patient:  [ ]Family   [ ]Team     Pain: [ ]yes [x ]no  QOL impact -   Location -                    Aggravating factors -  Quality -  Radiation -  Timing-  Severity (0-10 scale):  Minimal acceptable level/pain goal (0-10 scale):       Dyspnea:                           [ ]Mild [ ]Moderate [ ]Severe  Anxiety:                             [ ]Mild [ ]Moderate [ ]Severe  Fatigue:                             [ ]Mild [ ]Moderate [ ]Severe  Nausea:                             [x ]Mild [ ]Moderate [ ]Severe  Loss of appetite:              [ ]Mild [ ]Moderate [ ]Severe  Constipation:                    [ ]Mild [ ]Moderate [ ]Severe    Other Symptoms:  [x ]All other review of systems negative     PCSSQ[Palliative Care Spiritual Screening Question]   Severity (0-10):  Chaplaincy Referral: [ ] yes [ ] refused [ ] following [ ] deferred     Caregiver [place]? : [ ] yes [ ] no [x ] Deferred [ ] Declined             Social work referral [ ] Patient & Family Centered Care Referral [ ]  Anticipatory Grief present?:  [ ] yes [x ] no  [ ] Deferred                  Social work referral [ ] Patient & Family Centered Care Referral [ ]    PHYSICAL EXAM:  Vital Signs Last 24 Hrs  T(C): 37.1 (25 Sep 2023 18:04), Max: 37.1 (25 Sep 2023 18:04)  T(F): 98.8 (25 Sep 2023 18:04), Max: 98.8 (25 Sep 2023 18:04)  HR: 88 (25 Sep 2023 18:04) (72 - 99)  BP: 110/72 (25 Sep 2023 18:04) (107/69 - 114/71)  BP(mean): --  RR: 17 (25 Sep 2023 18:04) (17 - 18)  SpO2: 95% (25 Sep 2023 18:04) (94% - 98%)    Parameters below as of 25 Sep 2023 18:04  Patient On (Oxygen Delivery Method): nasal cannula     I&O's Summary    24 Sep 2023 07:01  -  25 Sep 2023 07:00  --------------------------------------------------------  IN: 720 mL / OUT: 380 mL / NET: 340 mL      GENERAL: [ ]Cachexia    [x ]Alert  [x ]Oriented x 3  [ ]Lethargic  [ ]Unarousable  [ ]Verbal  [ ]Non-Verbal  Behavioral:   [ ] Anxiety  [ ] Delirium [ ] Agitation [ ] Other  HEENT:  [x ]Normal   [ ]Dry mouth   [ ]ET Tube/Trach  [ ]Oral lesions  PULMONARY:   [x ]Clear [ ]Tachypnea  [ ]Audible excessive secretions   [ ]Rhonchi        [ ]Right [ ]Left [ ]Bilateral  [ ]Crackles        [ ]Right [ ]Left [ ]Bilateral  [ ]Wheezing     [ ]Right [ ]Left [ ]Bilateral  [ ]Diminished breath sounds [ ]right [ ]left [ ]bilateral  CARDIOVASCULAR:    [x ]Regular [ ]Irregular [ ]Tachy  [ ]Costa [ ]Murmur [ ]Other  GASTROINTESTINAL:  [x ]Soft  [ ]Distended   [ ]+BS  [ ]Non tender [ ]Tender  [ ]Other [ ]PEG [ ]OGT/ NGT  Last BM:  GENITOURINARY:  [ ]Normal [ ] Incontinent   [ ]Oliguria/Anuria   [ ]Alvarez  MUSCULOSKELETAL:   [x ]Normal   [ ]Weakness  [ ]Bed/Wheelchair bound [ ]Edema  NEUROLOGIC:   [x ]No focal deficits  [ ]Cognitive impairment  [ ]Dysphagia [ ]Dysarthria [ ]Paresis [ ]Other   SKIN: Please see flowsheets   [d ]Normal  [ ]Rash  [ ]Other  [ ]Pressure ulcer(s)       Present on admission [ ]y [ ]n      LABS:                        12.8   16.11 )-----------( 341      ( 25 Sep 2023 06:21 )             40.0   09-25    139  |  104  |  9   ----------------------------<  120<H>  3.7   |  26  |  1.13    Ca    9.1      25 Sep 2023 06:21  Phos  3.6     09-25  Mg     2.40     09-25    TPro  6.2  /  Alb  3.2<L>  /  TBili  <0.2  /  DBili  x   /  AST  14  /  ALT  21  /  AlkPhos  146<H>  09-25      Urinalysis Basic - ( 25 Sep 2023 06:21 )    Color: x / Appearance: x / SG: x / pH: x  Gluc: 120 mg/dL / Ketone: x  / Bili: x / Urobili: x   Blood: x / Protein: x / Nitrite: x   Leuk Esterase: x / RBC: x / WBC x   Sq Epi: x / Non Sq Epi: x / Bacteria: x      RADIOLOGY & ADDITIONAL STUDIES:  < from: CT Chest No Cont (09.20.23 @ 06:30) >  IMPRESSION:  Emphysema. Bibasilar consolidations representing pneumonia. Incidental 5   mm right middle lobe nodule and mildly enlarged subcarinal lymph node.   Recommend follow-up chest CT in 1-3 months to determine resolution.    Mesenteric fat infiltration new from CT abdomen dated 4/14/2023,   indeterminant etiology. Further evaluation can be performed with   dedicated contrast-enhanced CT abdomen and pelvis.    Retroperitoneal mass is again noted.      < from: CT Abdomen and Pelvis w/ IV Cont (09.22.23 @ 19:14) >    IMPRESSION:  Right lower lobe pneumonia and left basilar opacity which could represent   atelectasis versus pneumonia.    Retroperitoneal and mesenteric soft tissue masses, without significant   change overall since prior study, in keeping with history of lymphoma.    Questionable rectal and descending colon wall thickening. Correlate for   symptoms of proctocolitis.    < end of copied text >    PROTEIN CALORIE MALNUTRITION PRESENT: [ ]mild [ ]moderate [ ]severe [ ]underweight [ ]morbid obesity  https://www.andeal.org/vault/2440/web/files/ONC/Table_Clinical%20Characteristics%20to%20Document%20Malnutrition-White%20JV%20et%20al%202012.pdf    Height (cm): 170.2 (10-29-22 @ 03:08)  Weight (kg): 71.3 (04-14-23 @ 21:00), 63.9 (10-29-22 @ 03:08)  BMI (kg/m2): 24.6 (04-14-23 @ 21:00), 22.1 (10-29-22 @ 03:08)    [ ]PPSV2 < or = to 30% [ ]significant weight loss  [ ]poor nutritional intake  [ ]anasarca[ ]Artificial Nutrition      Other REFERRALS:  [ ]Hospice  [ ]Child Life  [ ]Social Work  [ ]Case management [ ]Holistic Therapy     Goals of Care Document:  [facility] and Palliative [facility]  Davon Morrison Palliative Care Attending  Contact Info: Page [phone] (including Nights/Weekends), message on Microsoft Teams (Davon Morrison), or leave VM at Palliative Office [phone] (non-urgent)     Date of [id number] @ 18:12  HPI:  63M, Czech-speaking, with hx of 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 Symptoms began before going to [facility] improved there with abx, but did not completely resolve. Now complaining of productive cough, chest pain when coughing, nausea, vomiting, loose stool, anorexia, weakness, insomnia for cough. On presentation found to be febrile with leukocytosis, + Rhinovirus on RVP. Patient currently admitted for pneumonia, in the setting of viral infection or possibly eosinophilic pna. Patient has been evaluated by ID and pulmonary teams, currently on Zosyn/Flagyl/entecavir and TB ruled out. Patient requiring supplemental oxygen.     Palliative care consulted for symptoms/goals of care given NHL.     Met with patient at bedside. His wife Karina, son Narendra and his brother were also present. Patient shares that he feels that since coming to the hospital, he has improved slightly and symptoms are not getting worse. He shared that symptoms of cough and shortness of breath began before he left for [facility], and progressed there and even with antibiotics treatment did not improve. As such last Tuesday he presented to the hospital after returning from [facility] the day prior.    He shared that prior to current illness he was able to ambulate and perform ADLs without assitance. He works as an .  Currently he is able to ambulate to the bathroom without assistance and without supplemental oxygen.  He shared that he has some nausea with seeing food and earlier today had some thick secretions but no emesis which improved with antiemetics. He does not like the hospital food very much or tolerate it very well. He was able to eat some bland rice porridge that his wife brought from home. I encouraged him to eat food from home if he's able to tolerate it.   He is having BMs. He endorsed feeling fatigued and is hoping to improve.  Regarding his cancer he shared that he's been following oncologist Dr. Alondra Mcgill and shared his appreciation for the excellent care he's received from his oncology team.  He became tearful in discussing his disease history but remains steadfast in his belief in God. He also has a good support system at home -- lives with wife and two adult children at [place].  He's originally from [facility] but also spent many years in [place] prior to immigration to the .     PERTINENT PM/SXH:   Non Hodgkin's lymphoma    Hepatitis B      No significant past surgical history    History of cholecystectomy      FAMILY HISTORY:  FH: lymphoma (Mother)      Family Hx substance abuse [ ]yes [ ]no  ITEMS NOT CHECKED ARE NOT PRESENT    SOCIAL HISTORY:   Significant other/partner[x ] wife; Children[ 2]: Tyler (daughter), Narendra (son)  Rastafari/Spirituality: Jainism   Substance hx:  [ ]   Tobacco hx:  [ ]   Alcohol hx: [ ]  None   Home Opioid hx:  [ ] I-Stop Reference No:  Living Situation: [x ]Home  [ ]Long term care  [ ]Rehab [ ]Other    ADVANCE DIRECTIVES:    DNR/MOLST  [ ]  Living Will  [ ]   DECISION MAKER(s): Self   [ ] Health Care Proxy(s)  [ ] Surrogate(s)  [ ] Guardian           Name(s): Phone Number(s):  Domenico Jade ([phone]    BASELINE (I)ADL(s) (prior to admission):  [place]: [ ]Total  [ ] Moderate [ ]Dependent    Allergies    No Known Allergies    Intolerances    MEDICATIONS  (STANDING):  acyclovir   Oral Tab/Cap 400 milliGRAM(s) Oral two times a day  enoxaparin Injectable 40 milliGRAM(s) SubCutaneous every 24 hours  entecavir 0.5 milliGRAM(s) Oral daily  metroNIDAZOLE    Tablet 750 milliGRAM(s) Oral three times a day  oxybutynin XL 5 milliGRAM(s) Oral daily  piperacillin/tazobactam IVPB.. 3.375 Gram(s) IV Intermittent every 8 hours  tamsulosin 0.4 milliGRAM(s) Oral at bedtime    MEDICATIONS  (PRN):  acetaminophen     Tablet .. 650 milliGRAM(s) Oral every 6 hours PRN Temp greater or equal to 38C (100.4F), Mild Pain (1 - 3)  albuterol/ipratropium for Nebulization 3 milliLiter(s) Nebulizer every 6 hours PRN Bronchospasm  benzonatate 100 milliGRAM(s) Oral every 8 hours PRN Cough  melatonin 3 milliGRAM(s) Oral at bedtime PRN Insomnia    PRESENT SYMPTOMS: [ ]Unable to self-report  [ ] CPOT [ ] PAINADs [ ] RDOS  Source if other than patient:  [ ]Family   [ ]Team     Pain: [ ]yes [x ]no  QOL impact -   Location -                    Aggravating factors -  Quality -  Radiation -  Timing-  Severity (0-10 scale):  Minimal acceptable level/pain goal (0-10 scale):       Dyspnea:                           [ ]Mild [ ]Moderate [ ]Severe  Anxiety:                             [ ]Mild [ ]Moderate [ ]Severe  Fatigue:                             [ ]Mild [ ]Moderate [ ]Severe  Nausea:                             [x ]Mild [ ]Moderate [ ]Severe  Loss of appetite:              [ ]Mild [ ]Moderate [ ]Severe  Constipation:                    [ ]Mild [ ]Moderate [ ]Severe    Other Symptoms:  [x ]All other review of systems negative     PCSSQ[Palliative Care Spiritual Screening Question]   Severity (0-10):  Chaplaincy Referral: [ ] yes [ ] refused [ ] following [ ] deferred     Caregiver [place]? : [ ] yes [ ] no [x ] Deferred [ ] Declined             Social work referral [ ] Patient & Family Centered Care Referral [ ]  Anticipatory Grief present?:  [ ] yes [x ] no  [ ] Deferred                  Social work referral [ ] Patient & Family Centered Care Referral [ ]    PHYSICAL EXAM:  Vital Signs Last 24 Hrs  T(C): 37.1 (25 Sep 2023 18:04), Max: 37.1 (25 Sep 2023 18:04)  T(F): 98.8 (25 Sep 2023 18:04), Max: 98.8 (25 Sep 2023 18:04)  HR: 88 (25 Sep 2023 18:04) (72 - 99)  BP: 110/72 (25 Sep 2023 18:04) (107/69 - 114/71)  BP(mean): --  RR: 17 (25 Sep 2023 18:04) (17 - 18)  SpO2: 95% (25 Sep 2023 18:04) (94% - 98%)    Parameters below as of 25 Sep 2023 18:04  Patient On (Oxygen Delivery Method): nasal cannula     I&O's Summary    24 Sep 2023 07:01  -  25 Sep 2023 07:00  --------------------------------------------------------  IN: 720 mL / OUT: 380 mL / NET: 340 mL      GENERAL: [ ]Cachexia    [x ]Alert  [x ]Oriented x 3  [ ]Lethargic  [ ]Unarousable  [ ]Verbal  [ ]Non-Verbal  Behavioral:   [ ] Anxiety  [ ] Delirium [ ] Agitation [ ] Other  HEENT:  [x ]Normal   [ ]Dry mouth   [ ]ET Tube/Trach  [ ]Oral lesions  PULMONARY:   [x ]Clear [ ]Tachypnea  [ ]Audible excessive secretions   [ ]Rhonchi        [ ]Right [ ]Left [ ]Bilateral  [ ]Crackles        [ ]Right [ ]Left [ ]Bilateral  [ ]Wheezing     [ ]Right [ ]Left [ ]Bilateral  [ ]Diminished breath sounds [ ]right [ ]left [ ]bilateral  CARDIOVASCULAR:    [x ]Regular [ ]Irregular [ ]Tachy  [ ]Costa [ ]Murmur [ ]Other  GASTROINTESTINAL:  [x ]Soft  [ ]Distended   [ ]+BS  [ ]Non tender [ ]Tender  [ ]Other [ ]PEG [ ]OGT/ NGT  Last BM:  GENITOURINARY:  [ ]Normal [ ] Incontinent   [ ]Oliguria/Anuria   [ ]Alvarez  MUSCULOSKELETAL:   [x ]Normal   [ ]Weakness  [ ]Bed/Wheelchair bound [ ]Edema  NEUROLOGIC:   [x ]No focal deficits  [ ]Cognitive impairment  [ ]Dysphagia [ ]Dysarthria [ ]Paresis [ ]Other   SKIN: Please see flowsheets   [d ]Normal  [ ]Rash  [ ]Other  [ ]Pressure ulcer(s)       Present on admission [ ]y [ ]n      LABS:                        12.8   16.11 )-----------( 341      ( 25 Sep 2023 06:21 )             40.0   09-25    139  |  104  |  9   ----------------------------<  120<H>  3.7   |  26  |  1.13    Ca    9.1      25 Sep 2023 06:21  Phos  3.6     09-25  Mg     2.40     09-25    TPro  6.2  /  Alb  3.2<L>  /  TBili  <0.2  /  DBili  x   /  AST  14  /  ALT  21  /  AlkPhos  146<H>  09-25      Urinalysis Basic - ( 25 Sep 2023 06:21 )    Color: x / Appearance: x / SG: x / pH: x  Gluc: 120 mg/dL / Ketone: x  / Bili: x / Urobili: x   Blood: x / Protein: x / Nitrite: x   Leuk Esterase: x / RBC: x / WBC x   Sq Epi: x / Non Sq Epi: x / Bacteria: x      RADIOLOGY & ADDITIONAL STUDIES:  < from: CT Chest No Cont (09.20.23 @ 06:30) >  IMPRESSION:  Emphysema. Bibasilar consolidations representing pneumonia. Incidental 5   mm right middle lobe nodule and mildly enlarged subcarinal lymph node.   Recommend follow-up chest CT in 1-3 months to determine resolution.    Mesenteric fat infiltration new from CT abdomen dated 4/14/2023,   indeterminant etiology. Further evaluation can be performed with   dedicated contrast-enhanced CT abdomen and pelvis.    Retroperitoneal mass is again noted.      < from: CT Abdomen and Pelvis w/ IV Cont (09.22.23 @ 19:14) >    IMPRESSION:  Right lower lobe pneumonia and left basilar opacity which could represent   atelectasis versus pneumonia.    Retroperitoneal and mesenteric soft tissue masses, without significant   change overall since prior study, in keeping with history of lymphoma.    Questionable rectal and descending colon wall thickening. Correlate for   symptoms of proctocolitis.    < end of copied text >    PROTEIN CALORIE MALNUTRITION PRESENT: [ ]mild [ ]moderate [ ]severe [ ]underweight [ ]morbid obesity  https://www.andeal.org/vault/2440/web/files/ONC/Table_Clinical%20Characteristics%20to%20Document%20Malnutrition-White%20JV%20et%20al%202012.pdf    Height (cm): 170.2 (10-29-22 @ 03:08)  Weight (kg): 71.3 (04-14-23 @ 21:00), 63.9 (10-29-22 @ 03:08)  BMI (kg/m2): 24.6 (04-14-23 @ 21:00), 22.1 (10-29-22 @ 03:08)    [ ]PPSV2 < or = to 30% [ ]significant weight loss  [ ]poor nutritional intake  [ ]anasarca[ ]Artificial Nutrition      Other REFERRALS:  [ ]Hospice  [ ]Child Life  [ ]Social Work  [ ]Case management [ ]Holistic Therapy     Goals of Care Document:  [facility] Pall[facility]  Davon Morrison Palliative Care Attending  Contact Info: Page [phone] (including Nights/Weekends), message on Microsoft Teams (Davon Morrison), or leave VM at Palliative Office [phone] (non-urgent)     Date of [id number] @ 18:12  HPI:  63M, Thai-speaking, with hx of 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 Symptoms began before going to [facility] improved there with abx, but did not completely resolve. Now complaining of productive cough, chest pain when coughing, nausea, vomiting, loose stool, anorexia, weakness, insomnia for cough. On presentation found to be febrile with leukocytosis, + Rhinovirus on RVP. Patient currently admitted for pneumonia, in the setting of viral infection or possibly eosinophilic pna. Patient has been evaluated by ID and pulmonary teams, currently on Zosyn/Flagyl/entecavir and TB ruled out. Patient requiring supplemental oxygen.     Palliative care consulted for symptoms/goals of care given [facility].     Met with patient at bedside. His wife Karina, son Narendra and his brother were also present. Patient shares that he feels that since coming to the hospital, he has improved slightly and symptoms are not getting worse. He shared that symptoms of cough and shortness of breath began before he left for [facility], and progressed there and even with antibiotics treatment did not improve. As such last Tuesday he presented to the hospital after returning from [facility] the day prior.    He shared that prior to current illness he was able to ambulate and perform ADLs without assitance. He works as an .  Currently he is able to ambulate to the bathroom without assistance and without supplemental oxygen.  He shared that he has some nausea with seeing food and earlier today had some thick secretions but no emesis which improved with antiemetics. He does not like the hospital food very much or tolerate it very well. He was able to eat some bland rice porridge that his wife brought from home. I encouraged him to eat food from home if he's able to tolerate it.   He is having BMs. He endorsed feeling fatigued and is hoping to improve.  Regarding his cancer he shared that he's been following oncologist Dr. Alondra Mcgill and shared his appreciation for the excellent care he's received from his oncology team.  He became tearful in discussing his disease history but remains steadfast in his belief in God. He also has a good support system at home -- lives with wife and two adult children at [place].  He's originally from [facility] but also spent many years in [place] prior to immigration to the .     PERTINENT PM/SXH:   Non Hodgkin's lymphoma    Hepatitis B      No significant past surgical history    History of cholecystectomy      FAMILY HISTORY:  FH: lymphoma (Mother)      Family Hx substance abuse [ ]yes [ ]no  ITEMS NOT CHECKED ARE NOT PRESENT    SOCIAL HISTORY:   Significant other/partner[x ] wife; Children[ 2]: Tyler (daughter), Narendra (son)  Mormonism/Spirituality: Mormon   Substance hx:  [ ]   Tobacco hx:  [ ]   Alcohol hx: [ ]  None   Home Opioid hx:  [ ] I-Stop Reference No:  Living Situation: [x ]Home  [ ]Long term care  [ ]Rehab [ ]Other  Occupation: works as      ADVANCE DIRECTIVES:    DNR/MOLST  [ ]  Living Will  [ ]   DECISION MAKER(s): Self   [ ] Health Care Proxy(s)  [ ] Surrogate(s)  [ ] Guardian           Name(s): Phone Number(s):  Daughter Tyler Jade ([phone]    BASELINE (I)ADL(s) (prior to admission):  [place]: [ ]Total  [ ] Moderate [ ]Dependent    Allergies    No Known Allergies    Intolerances    MEDICATIONS  (STANDING):  acyclovir   Oral Tab/Cap 400 milliGRAM(s) Oral two times a day  enoxaparin Injectable 40 milliGRAM(s) SubCutaneous every 24 hours  entecavir 0.5 milliGRAM(s) Oral daily  metroNIDAZOLE    Tablet 750 milliGRAM(s) Oral three times a day  oxybutynin XL 5 milliGRAM(s) Oral daily  piperacillin/tazobactam IVPB.. 3.375 Gram(s) IV Intermittent every 8 hours  tamsulosin 0.4 milliGRAM(s) Oral at bedtime    MEDICATIONS  (PRN):  acetaminophen     Tablet .. 650 milliGRAM(s) Oral every 6 hours PRN Temp greater or equal to 38C (100.4F), Mild Pain (1 - 3)  albuterol/ipratropium for Nebulization 3 milliLiter(s) Nebulizer every 6 hours PRN Bronchospasm  benzonatate 100 milliGRAM(s) Oral every 8 hours PRN Cough  melatonin 3 milliGRAM(s) Oral at bedtime PRN Insomnia    PRESENT SYMPTOMS: [ ]Unable to self-report  [ ] CPOT [ ] PAINADs [ ] RDOS  Source if other than patient:  [ ]Family   [ ]Team     Pain: [ ]yes [x ]no  QOL impact -   Location -                    Aggravating factors -  Quality -  Radiation -  Timing-  Severity (0-10 scale):  Minimal acceptable level/pain goal (0-10 scale):       Dyspnea:                           [ ]Mild [ ]Moderate [ ]Severe  Anxiety:                             [ ]Mild [ ]Moderate [ ]Severe  Fatigue:                             [ ]Mild [ ]Moderate [ ]Severe  Nausea:                             [x ]Mild [ ]Moderate [ ]Severe  Loss of appetite:              [ ]Mild [ ]Moderate [ ]Severe  Constipation:                    [ ]Mild [ ]Moderate [ ]Severe    Other Symptoms:  [x ]All other review of systems negative     PCSSQ[Palliative Care Spiritual Screening Question]   Severity (0-10):  Chaplaincy Referral: [ ] yes [ ] refused [ ] following [ ] deferred     Caregiver [place]? : [ ] yes [ ] no [x ] Deferred [ ] Declined             Social work referral [ ] Patient & Family Centered Care Referral [ ]  Anticipatory Grief present?:  [ ] yes [x ] no  [ ] Deferred                  Social work referral [ ] Patient & Family Centered Care Referral [ ]    PHYSICAL EXAM:  Vital Signs Last 24 Hrs  T(C): 37.1 (25 Sep 2023 18:04), Max: 37.1 (25 Sep 2023 18:04)  T(F): 98.8 (25 Sep 2023 18:04), Max: 98.8 (25 Sep 2023 18:04)  HR: 88 (25 Sep 2023 18:04) (72 - 99)  BP: 110/72 (25 Sep 2023 18:04) (107/69 - 114/71)  BP(mean): --  RR: 17 (25 Sep 2023 18:04) (17 - 18)  SpO2: 95% (25 Sep 2023 18:04) (94% - 98%)    Parameters below as of 25 Sep 2023 18:04  Patient On (Oxygen Delivery Method): nasal cannula     I&O's Summary    24 Sep 2023 07:01  -  25 Sep 2023 07:00  --------------------------------------------------------  IN: 720 mL / OUT: 380 mL / NET: 340 mL      GENERAL: [ ]Cachexia    [x ]Alert  [x ]Oriented x 3  [ ]Lethargic  [ ]Unarousable  [ ]Verbal  [ ]Non-Verbal  Behavioral:   [ ] Anxiety  [ ] Delirium [ ] Agitation [ ] Other  HEENT:  [x ]Normal   [ ]Dry mouth   [ ]ET Tube/Trach  [ ]Oral lesions  PULMONARY:   [x ]Clear [ ]Tachypnea  [ ]Audible excessive secretions   [ ]Rhonchi        [ ]Right [ ]Left [ ]Bilateral  [ ]Crackles        [ ]Right [ ]Left [ ]Bilateral  [ ]Wheezing     [ ]Right [ ]Left [ ]Bilateral  [ ]Diminished breath sounds [ ]right [ ]left [ ]bilateral  CARDIOVASCULAR:    [x ]Regular [ ]Irregular [ ]Tachy  [ ]Costa [ ]Murmur [ ]Other  GASTROINTESTINAL:  [x ]Soft  [ ]Distended   [ ]+BS  [ ]Non tender [ ]Tender  [ ]Other [ ]PEG [ ]OGT/ NGT  Last BM:  GENITOURINARY:  [ ]Normal [ ] Incontinent   [ ]Oliguria/Anuria   [ ]Alvarez  MUSCULOSKELETAL:   [x ]Normal   [ ]Weakness  [ ]Bed/Wheelchair bound [ ]Edema  NEUROLOGIC:   [x ]No focal deficits  [ ]Cognitive impairment  [ ]Dysphagia [ ]Dysarthria [ ]Paresis [ ]Other   SKIN: Please see flowsheets   [d ]Normal  [ ]Rash  [ ]Other  [ ]Pressure ulcer(s)       Present on admission [ ]y [ ]n      LABS:                        12.8   16.11 )-----------( 341      ( 25 Sep 2023 06:21 )             40.0   09-25    139  |  104  |  9   ----------------------------<  120<H>  3.7   |  26  |  1.13    Ca    9.1      25 Sep 2023 06:21  Phos  3.6     09-25  Mg     2.40     09-25    TPro  6.2  /  Alb  3.2<L>  /  TBili  <0.2  /  DBili  x   /  AST  14  /  ALT  21  /  AlkPhos  146<H>  09-25      Urinalysis Basic - ( 25 Sep 2023 06:21 )    Color: x / Appearance: x / SG: x / pH: x  Gluc: 120 mg/dL / Ketone: x  / Bili: x / Urobili: x   Blood: x / Protein: x / Nitrite: x   Leuk Esterase: x / RBC: x / WBC x   Sq Epi: x / Non Sq Epi: x / Bacteria: x      RADIOLOGY & ADDITIONAL STUDIES:  < from: CT Chest No Cont (09.20.23 @ 06:30) >  IMPRESSION:  Emphysema. Bibasilar consolidations representing pneumonia. Incidental 5   mm right middle lobe nodule and mildly enlarged subcarinal lymph node.   Recommend follow-up chest CT in 1-3 months to determine resolution.    Mesenteric fat infiltration new from CT abdomen dated 4/14/2023,   indeterminant etiology. Further evaluation can be performed with   dedicated contrast-enhanced CT abdomen and pelvis.    Retroperitoneal mass is again noted.      < from: CT Abdomen and Pelvis w/ IV Cont (09.22.23 @ 19:14) >    IMPRESSION:  Right lower lobe pneumonia and left basilar opacity which could represent   atelectasis versus pneumonia.    Retroperitoneal and mesenteric soft tissue masses, without significant   change overall since prior study, in keeping with history of lymphoma.    Questionable rectal and descending colon wall thickening. Correlate for   symptoms of proctocolitis.    < end of copied text >    PROTEIN CALORIE MALNUTRITION PRESENT: [ ]mild [ ]moderate [ ]severe [ ]underweight [ ]morbid obesity  https://www.andeal.org/vault/2440/web/files/ONC/Table_Clinical%20Characteristics%20to%20Document%20Malnutrition-White%20JV%20et%20al%202012.pdf    Height (cm): 170.2 (10-29-22 @ 03:08)  Weight (kg): 71.3 (04-14-23 @ 21:00), 63.9 (10-29-22 @ 03:08)  BMI (kg/m2): 24.6 (04-14-23 @ 21:00), 22.1 (10-29-22 @ 03:08)    [ ]PPSV2 < or = to 30% [ ]significant weight loss  [ ]poor nutritional intake  [ ]anasarca[ ]Artificial Nutrition      Other REFERRALS:  [ ]Hospice  [ ]Child Life  [ ]Social Work  [ ]Case management [ ]Holistic Therapy     Goals of Care Document:

## 2023-09-25 NOTE — CONSULT NOTE ADULT - CONVERSATION DETAILS
Introduced patient and family to the geriatrics and palliative care team.   Regarding his cancer he shared that he's been following oncologist Dr. Alondra Mcgill and shared his appreciation for the excellent care he's received from his oncology team. He shared that he was diagnosed with late stage cancer but is now the disease has stabilized and he is getting treatment every 2 months.     He became tearful in discussing his disease history but remains steadfast in his belief in God. He also has a good support system at home -- lives with wife and two adult children at [place].    We briefly discussed ACP -- patient shared that they make decision as a family including patient, wife Karina Jade, and two adult children: daughter Tyler Jade ([phone]) and son Narendra Jade. He has not completed any previous ACP documents.

## 2023-09-25 NOTE — CONSULT NOTE ADULT - PROBLEM SELECTOR RECOMMENDATION 2
Reed in 2021, currently on maintenance dose Rituxan, last given on August 22, 2023  Patient follows Dr. Alondra Mcgill Dx in 2021, currently on maintenance dose Rituxan, last given on August 22, 2023  Patient follows Oncologist Dr. Alondra Mcgill

## 2023-09-25 NOTE — CONSULT NOTE ADULT - ASSESSMENT
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    #recommendations  at time of eval patient tolerating RA with improvement of symptoms without steroids while on abx  at this time would not recommend bronchoscopy with BAL and tbbx to rule out opportunistic infection, organizing pna, easinophilic pneumonia, pneumonitis  also would hold off steroids at this time as peripheral easinophils not correlating with lung findings which also have an alternative explanation  would send serum galactomannan and sputum O and P if possible  has normal resp willie in sputum culture  obtain urine strep, urine legionella, nasal MRSA swab  follow up other serologic markers,   titrate oxygen to O2 >88%, use humidified oxygen  incentive spirometry, OOB to chair, PT/OT  aspiration precautions as necessary  DVT ppx as tolerated  airway clearance as necessary  case discussed with family at bedside  will need follow up CT in 6 weeks 63M Welsh-speaking former smoker, with hx of Non-Hodgkin's Lymphoma on Rituxan , Hepatitis B on entecavir (now?) , 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    #recommendations  at time of eval patient tolerating RA with improvement of symptoms without steroids while on abx  at this time would not recommend bronchoscopy with BAL and tbbx to rule out opportunistic infection, organizing pna, easinophilic pneumonia, pneumonitis  also would hold off steroids at this time as peripheral easinophils not correlating with lung findings which also have an alternative explanation  continue abx as per ID  would send serum galactomannan and sputum O and P if possible  has normal resp willie in sputum culture  obtain urine strep, urine legionella, nasal MRSA swab  follow up other serologic markers,   titrate oxygen to O2 >88%, use humidified oxygen  incentive spirometry, OOB to chair, PT/OT  aspiration precautions as necessary  DVT ppx as tolerated  airway clearance as necessary  case discussed with family at bedside  will need follow up CT in 6 weeks

## 2023-09-25 NOTE — CONSULT NOTE ADULT - CONSULT REASON
Goals of care in the setting of advanced illness Goals of care and symptoms management in the setting of advanced illness

## 2023-09-25 NOTE — CONSULT NOTE ADULT - PROBLEM SELECTOR RECOMMENDATION 4
Patient has nausea with smelling hospital food   Was able to tolerate bland home rice porridge that his wife brought   No emesis  Trial of zofran today which helped  -Advised that he can eat home cooked meals if that is better tolerated  -Can continue PRN zofran if further episodes  -Will discuss other antiemetics including zyprexa if nausea/vomiting persists

## 2023-09-25 NOTE — CONSULT NOTE ADULT - PROBLEM SELECTOR PROBLEM 2
After Visit Summary   10/24/2018    Suzy Gallardo    MRN: [id number]           Patient Information     Date Of Birth          1964        Visit Information        Provider Department      10/24/2018 8:15 AM Nathanael Mena MD [facility]        Today's Diagnoses     Type 2 diabetes mellitus without complication, unspecified whether long term insulin use (H)    -  1    Screening for HIV (human immunodeficiency virus)        Chest tightness or pressure          Care Instructions      Preventive Health Recommendations  Female Ages 50 - 64    Yearly exam: See your health care provider every year in order to  o Review health changes.   o Discuss preventive care.    o Review your medicines if your doctor has prescribed any.      Get a Pap test every three years (unless you have an abnormal result and your provider advises testing more often).    If you get Pap tests with HPV test, you only need to test every 5 years, unless you have an abnormal result.     You do not need a Pap test if your uterus was removed (hysterectomy) and you have not had cancer.    You should be tested each year for STDs (sexually transmitted diseases) if you're at risk.     Have a mammogram every 1 to 2 years.    Have a colonoscopy at age 50, or have a yearly FIT test (stool test). These exams screen for colon cancer.      Have a cholesterol test every 5 years, or more often if advised.    Have a diabetes test (fasting glucose) every three years. If you are at risk for diabetes, you should have this test more often.     If you are at risk for osteoporosis (brittle bone disease), think about having a bone density scan (DEXA).    Shots: Get a flu shot each year. Get a tetanus shot every 10 years.    Nutrition:     Eat at least 5 servings of fruits and vegetables each day.    Eat whole-grain bread, whole-wheat pasta and brown rice instead of white grains and rice.    Get adequate Calcium and Vitamin D. 
    Lifestyle    Exercise at least 150 minutes a week (30 minutes a day, 5 days a week). This will help you control your weight and prevent disease.    Limit alcohol to one drink per day.    No smoking.     Wear sunscreen to prevent skin cancer.     See your dentist every six months for an exam and cleaning.    See your eye doctor every 1 to 2 years.            Follow-ups after your visit        Your next 10 appointments already scheduled     Oct 31, 2018  7:30 AM CDT   (Arrive by 7:15 AM)   NM INJECTION with [id number]   HI NUCLEAR MEDICINE ([facility] )    [address] [address]   [phone]            Oct 31, 2018  8:45 AM CDT   (Arrive by 8:30 AM)   NM SCAN3 with HINM1   [facility] NUCLEAR MEDICINE ([facility] )    [address] [address]   [phone]            Oct 31, 2018  9:30 AM CDT   Ekg Stress Nm Exercise 30m with HI STRESS RM1, HI STRESS PROVIDER   HI Electrocardiology ([facility] )    [address] [address]   [phone]            Oct 31, 2018 10:45 AM CDT   (Arrive by 10:30 AM)   NM MPI TREADMILL with HINM1   HI NUCLEAR MEDICINE ([facility] )    [address] [address]   [phone]           How do I prepare for my exam? (Food and drink instructions) Day 1 & Day 2: Stop all caffeine 12 hours before the test. This includes coffee, tea, soda pop, chocolate and certain medicines (such as Anacin, Excedrin and NoDoz). Also avoid decaf coffee and tea, as these contain small amounts of caffeine. Stop eating 4 hours before the test. You may drink water.  How do I prepare for my exam? (Other instructions) You may need to stop some medicines before the test. Follow your doctor s orders. Day 1 & Day 2: *If you take a beta blocker: Do not take your beta-blocker on the day before your test, unless specifically told to by your doctor. And do not take it on the day of your test. Bring it with you 
to take after the test.  *If you take Aggrenox or dipyridamole (Persantine, Permole), stop taking it 48 hours before your test. *If you take Viagra, Cialis or Levitra, stop taking it 48 hours before your test. *If you take theophylline or aminophylline, stop taking it 12 hours before your test.  For patients with diabetes: *If you take insulin, call your diabetes care team. Ask if you should take a 1/2 dose the morning of your test. *If you take diabetes medicine by mouth, don t take it on the morning of your test. Bring it with you to take after the test. (If you have questions, call your diabetes care team.)  *Do not take nitrates on the day of your test. Do not wear your Nitro-Patch. *No alcohol, smoking or other tobacco for 12 hours before the test.  What should I wear: Please wear a loose two-piece outfit. If you will have an exercise test, bring rubber-soled walking shoes.  How long does the exam take: *This test can take 1-2 days.* ONE day exam: Allow 3-4 hours for test. IF TWO day exam: Allow  minutes PER day for test.  What should I bring: Please bring a list of your medicines (including vitamins, minerals and over-the-counter drugs). Leave your valuables at home.  Do I need a :  No  is needed.  What do I need to tell my doctor? When you arrive, please tell us if you: * Have diabetes * Are breastfeeding * May be pregnant * Have a pacemaker of ICD (implantable defibrillator).  What should I do after the exam: No restrictions, You may resume normal activities.  What is this test: Your doctor has ordered a nuclear stress test to check how well blood is flowing through your heart. You will either exercise or take a medicine that mimics exercise; we will watch your heart.  Who should I call with questions: If you have any questions, please call the Imaging Department where you will have your exam. Directions, parking instructions, and other information is available on our website, 
[email or web address].            Apr 19, 2019  9:30 AM CDT   (Arrive by 9:15 AM)   PHYSICAL with Maryjane Lyon MD   [facility] ([facility] )    [address] [place] [address]   [phone]              Future tests that were ordered for you today     Open Future Orders        Priority Expected Expires Ordered    NM Exercise stress test Routine  10/24/2019 10/24/2018            Who to contact     If you have questions or need follow up information about today's clinic visit or your schedule please contact [facility] directly at [phone].  Normal or non-critical lab and imaging results will be communicated to you by MyChart, letter or phone within 4 business days after the clinic has received the results. If you do not hear from us within 7 days, please contact the clinic through [facility] or phone. If you have a critical or abnormal lab result, we will notify you by phone as soon as possible.  Submit refill requests through [facility] or call your pharmacy and they will forward the refill request to us. Please allow 3 business days for your refill to be completed.          Additional Information About Your Visit        MyChart Information     [facility] gives you secure access to your electronic health record. If you see a primary care provider, you can also send messages to your care team and make appointments. If you have questions, please call your primary care clinic.  If you do not have a primary care provider, please call [phone] and they will assist you.        Care EveryWhere ID     This is your Care EveryWhere ID. This could be used by other organizations to access your [facility] medical records  [id number]        Your Vitals Were     Pulse Temperature Height Pulse Oximetry BMI (Body Mass Index)       70 98  F (36.7  C) (Tympanic) 5' 2\" (1.575 m) 98% 34.2 kg/m2        Blood Pressure from Last 3 Encounters:   10/24/18 122/70 "
  05/01/18 134/74   04/17/18 128/82    Weight from Last 3 Encounters:   10/24/18 187 lb (84.8 kg)   05/01/18 182 lb (82.6 kg)   04/17/18 192 lb (87.1 kg)              We Performed the Following     Albumin Random Urine Quantitative with Creat Ratio     Comprehensive metabolic panel     EKG 12-lead complete w/read - (Clinic Performed)     Hemoglobin A1c     HIV Antigen Antibody Combo     Lipid Profile          Today's Medication Changes          These changes are accurate as of 10/24/18  9:24 AM.  If you have any questions, ask your nurse or doctor.               These medicines have changed or have updated prescriptions.        Dose/Directions    amoxicillin-clavulanate 875-125 MG per tablet   Commonly known as:  AUGMENTIN   This may have changed:  Another medication with the same name was removed. Continue taking this medication, and follow the directions you see here.   Changed by:  Nathanael Mena MD        Dose:  1 tablet   Take 1 tablet by mouth 2 times daily   Refills:  0                Primary Care Provider Office Phone # Fax #    Nathanael Mena [phone]       [address] [place] [address]        Equal Access to Services     [facility] Center[facility] AH: Hadii aad ku hadasho Soomaali, waaxda luqadaha, qaybta kaalmada adeegyada, rachel valdez haytabby clemens . So [facility] [phone].    ATENCIÓN: Si habla español, tiene a banuelos disposición servicios gratuitos de asistencia lingüística. Ll[facility] [phone].    We comply with applicable federal civil rights laws and [place] laws. We do not discriminate on the basis of race, color, national origin, age, disability, sex, sexual orientation, or gender identity.            Thank you!     Thank you for choosing [facility]  for your care. Our goal is always to provide you with excellent care. Hearing back from our patients is one way we can continue to improve our services. Please take a few minutes to complete the 
"written survey that you may receive in the mail after your visit with us. Thank you!             Your Updated Medication List - Protect others around you: Learn how to safely use, store and throw away your medicines at www.disposemymeds.org.          This list is accurate as of 10/24/18  9:24 AM.  Always use your most recent med list.                   Brand Name Dispense Instructions for use Diagnosis    amoxicillin-clavulanate 875-125 MG per tablet    AUGMENTIN     Take 1 tablet by mouth 2 times daily        ASPIRIN PO      Take 81 mg by mouth daily        * BD U/F III SHORT PEN NEEDLE      See instructions.        * pen needles 5/16\" 31G X 8 MM Misc           calcium carbonate 600 mg-vitamin D 400 units 600-400 MG-UNIT per tablet    CALTRATE     Take 1 tablet by mouth daily.        furosemide 40 MG tablet    LASIX    180 tablet    TAKE 1 TABLET (40 MG) BY MOUTH 2 TIMES DAILY    Benign essential hypertension       GLUCOPHAGE PO      Take 1,000 mg by mouth 2 times daily (with meals).        IBUPROFEN PO           LANTUS SOLOSTAR 100 UNIT/ML injection   Generic drug:  insulin glargine      Inject 30 Units Subcutaneous At Bedtime        liraglutide 18 MG/3ML Soln    VICTOZA     Inject 1.8 mg Subcutaneous daily        multivitamin, therapeutic with minerals Tabs tablet      Take 1 tablet by mouth daily.        ONE TOUCH ULTRA      1 each strip TID AC        SIMVASTATIN PO      Take 40 mg by mouth daily.        sulfamethoxazole-trimethoprim 800-160 MG per tablet    BACTRIM DS/SEPTRA DS    14 tablet    Take 1 tablet by mouth 2 times daily    Hereditary edema of legs       venlafaxine 75 MG 24 hr capsule    EFFEXOR-XR    180 capsule    TAKE TWO CAPSULES BY MOUTH DAILY. DUE FOR FOLLOW UP VISIT    Dysthymia       * Notice:  This list has 2 medication(s) that are the same as other medications prescribed for you. Read the directions carefully, and ask your doctor or other care provider to review them with you.      "
Follicular lymphoma
Eosinophilia

## 2023-09-25 NOTE — CONSULT NOTE ADULT - TIME BILLING
Time spent for extensive review of the physical chart, electronic medical record, and documentation to obtain collateral information including but not limited to:  [x ] Inpatient records (ED, H&P, primary team, and consultants if applicable, care coordination)  [x] Inpatient values/results (biomarkers, immunoassays, imaging, and microbiology results)  [x ] Current or proposed treatment plans  [x] Discussion with the primary team  [x] Discussion with the patient, surrogate decision maker, or family    Time spent: >

## 2023-09-25 NOTE — PROGRESS NOTE ADULT - SUBJECTIVE AND OBJECTIVE BOX
[facility]  Pager: [phone]  Available via MS Teams    SUBJECTIVE / OVERNIGHT EVENTS:    Continues to have dyspnea, fatigue, N/V, cough and sputum     MEDICATIONS  (STANDING):  acyclovir   Oral Tab/Cap 400 milliGRAM(s) Oral two times a day  enoxaparin Injectable 40 milliGRAM(s) SubCutaneous every 24 hours  entecavir 0.5 milliGRAM(s) Oral daily  metroNIDAZOLE    Tablet 750 milliGRAM(s) Oral three times a day  oxybutynin XL 5 milliGRAM(s) Oral daily  piperacillin/tazobactam IVPB.. 3.375 Gram(s) IV Intermittent every 8 hours  tamsulosin 0.4 milliGRAM(s) Oral at bedtime    MEDICATIONS  (PRN):  acetaminophen     Tablet .. 650 milliGRAM(s) Oral every 6 hours PRN Temp greater or equal to 38C (100.4F), Mild Pain (1 - 3)  albuterol/ipratropium for Nebulization 3 milliLiter(s) Nebulizer every 6 hours PRN Bronchospasm  benzonatate 100 milliGRAM(s) Oral every 8 hours PRN Cough  melatonin 3 milliGRAM(s) Oral at bedtime PRN Insomnia  ondansetron Injectable 4 milliGRAM(s) IV Push once PRN Nausea and/or Vomiting      I&O's Summary    24 Sep 2023 07:01  -  25 Sep 2023 07:00  --------------------------------------------------------  IN: 720 mL / OUT: 380 mL / NET: 340 mL        PHYSICAL EXAM:  Vital Signs Last 24 Hrs  T(C): 36.8 (25 Sep 2023 10:36), Max: 36.9 (25 Sep 2023 07:00)  T(F): 98.3 (25 Sep 2023 10:36), Max: 98.4 (25 Sep 2023 07:00)  HR: 88 (25 Sep 2023 10:36) (72 - 99)  BP: 112/70 (25 Sep 2023 10:36) (107/69 - 114/71)  BP(mean): --  RR: 17 (25 Sep 2023 10:36) (17 - 18)  SpO2: 94% (25 Sep 2023 10:36) (94% - 98%)    Parameters below as of 25 Sep 2023 10:36  Patient On (Oxygen Delivery Method): room air      CONSTITUTIONAL: NAD, well-developed   EYES: conjunctiva and sclera clear  ENMT: Moist oral mucosa   NECK: Supple   RESPIRATORY: crackles at the bases   CARDIOVASCULAR: Regular rate and rhythm, normal S1 and S2, no murmur/rub/gallop; Peripheral pulses are 2+ bilaterally  ABDOMEN: Nontender to palpation, normoactive bowel sounds, no rebound/guarding   MUSCULOSKELETAL: No lower extremity edema   PSYCH: A+O to person, place, and time; affect appropriate  NEUROLOGY: no gross sensory or motor deficits   SKIN: No rashes    LABS:                        12.8   16.11 )-----------( 341      ( 25 Sep 2023 06:21 )             40.0     09-25    139  |  104  |  9   ----------------------------<  120<H>  3.7   |  26  |  1.13    Ca    9.1      25 Sep 2023 06:21  Phos  3.6     09-25  Mg     2.40     09-25    TPro  6.2  /  Alb  3.2<L>  /  TBili  <0.2  /  DBili  x   /  AST  14  /  ALT  21  /  AlkPhos  146<H>  09-25          Urinalysis Basic - ( 25 Sep 2023 06:21 )    Color: x / Appearance: x / SG: x / pH: x  Gluc: 120 mg/dL / Ketone: x  / Bili: x / Urobili: x   Blood: x / Protein: x / Nitrite: x   Leuk Esterase: x / RBC: x / WBC x   Sq Epi: x / Non Sq Epi: x / Bacteria: x        SARS-CoV-2: NotDetec (19 Sep 2023 21:48)      RADIOLOGY & ADDITIONAL TESTS:  Other Results Reviewed Today: BMP with stable Cr, CBC with stable Hg     COORDINATION OF CARE:  Communication: discussed plan of care with ACP

## 2023-09-25 NOTE — CONSULT NOTE ADULT - ASSESSMENT
63M, Icelandic-speaking, with hx of Non-Hodgkin's Lymphoma ((follicular lymphona) on Rituxan, Hepatitis B on entecavir HLD, BPH who presented with 1 month of cough and constitutional symptoms. [facility] with [facility]x, but did not completely resolve. Also c/o nausea, vomiting, loose stool, anorexia, weakness, insomnia. On presentation found to be febrile with leukocytosis, + Rhinovirus on RVP.  Patient requiring supplemental oxygen. Palliative care consulted for goals/supportive care. I met with patient and his family at bedside. He shared that overall he's been improving or at least now worsening from a breathing standpoint. He is hoping for continued improvement and going home.   63M, Tamaz[place]-speaking, with hx of Non-Hodgkin's Lymphoma ((follicular lymphona) on Rituxan, Hepatitis B on entecavir HLD, BPH who presented with 1 month of cough and constitutional symptoms. Symptoms began before going to [facility] improved there with abx, but did not completely resolve. Also c/o nausea, vomiting, loose stool, anorexia, weakness, insomnia. On presentation found to be febrile with leukocytosis, + Rhinovirus on RVP.  Patient requiring supplemental oxygen. Palliative care consulted for goals/supportive care. I met with patient and his family at bedside. He shared that overall he's been improving somewhat from a breathing standpoint. He is hoping for continued improvement and going home.

## 2023-09-25 NOTE — CONSULT NOTE ADULT - PROBLEM SELECTOR RECOMMENDATION 5
Thank you for allowing us to participate in your patient's care. We will continue to follow with you. Please page [phone] for any q's or c's. The Geriatric and Palliative Medicine service has coverage 24 hours a day/ 7 days a week to provide medical recommendations regarding symptom management needs via telephone.    Davon Morrison MD  Palliative Medicine

## 2023-09-25 NOTE — CONSULT NOTE ADULT - SUBJECTIVE AND OBJECTIVE BOX
CHIEF COMPLAINT:    HPI:  HPI:  63M, Kyrgyz-speaking, with hx of Non-Hodgkin's Lymphoma on Rituxan , Hepatitis B on entecavir (now?) , Campylobacter/EPEC gastroenteritis (11/2022), HLD, BPH who presents with 1 month of cough and constitutional symptoms. Symptoms began before going to [facility] improved there with abx, but did not completely resolve. Now complaining of productive cough, chest pain when coughing, nausea, vomiting, loose stool, anorexia, weakness, insomnia for cough. CXR prelim clear. + Rhinovirus on rvp. Pt unsure of prior TB screenings, but was started on rituxan within last year per son (last given Aug 22)  T max here 100.7 (O), wbc 13.9 (20 Sep 2023 01:00)    patient found to be entero/rhinovirus positive. ruled out tb quant negative afb negative x3  pt had some diarrhea and O&P now with moderate blastomyces hominis  otherwise workup largely negative    patient former smoker from [facility] with no occupational exposures. no asthma or COPD. no hemopytsis. improved phlegm since admission    PAST MEDICAL & SURGICAL HISTORY:  Non Hodgkin's lymphoma      Hepatitis B      History of cholecystectomy          FAMILY HISTORY:  FH: lymphoma (Mother)        SOCIAL HISTORY:  former smoker from [facility]   Allergies    No Known Allergies    Intolerances        HOME MEDICATIONS:  Home Medications:  acyclovir 400 mg oral tablet: 1 tab(s) orally 2 times a day (20 Sep 2023 11:56)  atorvastatin 20 mg oral tablet: 1 tab(s) orally once a day (at bedtime) (20 Sep 2023 11:56)  entecavir 0.5 mg oral tablet: 1 tab(s) orally once a day (20 Sep 2023 11:56)  gemfibrozil 600 mg oral tablet: 1 tab(s) orally 2 times a day (20 Sep 2023 11:58)  oxyBUTYnin 5 mg/24 hours oral tablet, extended release: 1 tab(s) orally once a day (20 Sep 2023 11:58)  Symbicort 160 mcg-4.5 mcg/inh inhalation aerosol: 2 puff(s) inhaled 2 times a day (20 Sep 2023 11:58)  tamsulosin 0.4 mg oral capsule: 1 tab(s) orally once a day (20 Sep 2023 11:56)      REVIEW OF SYSTEMS:  Patient denies fevers, chills, , nausea, abdominal pain, diarrhea, constipation, dysuria, leg swelling, headache, light headedness    OBJECTIVE:  ICU Vital Signs Last 24 Hrs  T(C): 36.8 (25 Sep 2023 10:36), Max: 36.9 (25 Sep 2023 07:00)  T(F): 98.3 (25 Sep 2023 10:36), Max: 98.4 (25 Sep 2023 07:00)  HR: 88 (25 Sep 2023 10:36) (72 - 99)  BP: 112/70 (25 Sep 2023 10:36) (107/69 - 114/71)  BP(mean): --  ABP: --  ABP(mean): --  RR: 17 (25 Sep 2023 10:36) (17 - 18)  SpO2: 94% (25 Sep 2023 10:36) (94% - 98%)    O2 Parameters below as of 25 Sep 2023 10:36  Patient On (Oxygen Delivery Method): room air              09-24 @ 07:01  -  09-25 @ 07:00  --------------------------------------------------------  IN: 720 mL / OUT: 380 mL / NET: 340 mL      CAPILLARY BLOOD GLUCOSE          PHYSICAL EXAM:  General: awake and alert, nontoxic appearing *** lying in bed  HEENT: NC/AT, EOMI b/l, conjunctiva normal, MMM  Lymph Nodes: no cervical LAD  Neck: supple. full range of motion  Respiratory: some sparse wheezing, no w/r/c, appears comfortable on NC, no conversational dyspnea or accessory muscle use  Cardiovascular: S1 S2 present, RRR, no m/r/g  Abdomen: soft, NT/ND, +BS  Extremities: no c/c/e  Skin: no rashes or lesions noted  Neurological: AAOx3, no focal deficits  Psychiatry: calm, cooperative    LINES:     HOSPITAL MEDICATIONS:  Standing Meds:  acyclovir   Oral Tab/Cap 400 milliGRAM(s) Oral two times a day  enoxaparin Injectable 40 milliGRAM(s) SubCutaneous every 24 hours  entecavir 0.5 milliGRAM(s) Oral daily  metroNIDAZOLE    Tablet 750 milliGRAM(s) Oral three times a day  oxybutynin XL 5 milliGRAM(s) Oral daily  piperacillin/tazobactam IVPB.. 3.375 Gram(s) IV Intermittent every 8 hours  tamsulosin 0.4 milliGRAM(s) Oral at bedtime      PRN Meds:  acetaminophen     Tablet .. 650 milliGRAM(s) Oral every 6 hours PRN  albuterol/ipratropium for Nebulization 3 milliLiter(s) Nebulizer every 6 hours PRN  benzonatate 100 milliGRAM(s) Oral every 8 hours PRN  melatonin 3 milliGRAM(s) Oral at bedtime PRN      LABS:                        12.8   16.11 )-----------( 341      ( 25 Sep 2023 06:21 )             40.0     Hgb Trend: 12.8<--, 13.2<--, 12.6<--, 12.7<--, 12.0<--  09-25    139  |  104  |  9   ----------------------------<  120<H>  3.7   |  26  |  1.13    Ca    9.1      25 Sep 2023 06:21  Phos  3.6     09-25  Mg     2.40     09-25    TPro  6.2  /  Alb  3.2<L>  /  TBili  <0.2  /  DBili  x   /  AST  14  /  ALT  21  /  AlkPhos  146<H>  09-25    Creatinine Trend: 1.13<--, 1.18<--, 1.25<--, 1.27<--, 1.21<--, 1.13<--    Urinalysis Basic - ( 25 Sep 2023 06:21 )    Color: x / Appearance: x / SG: x / pH: x  Gluc: 120 mg/dL / Ketone: x  / Bili: x / Urobili: x   Blood: x / Protein: x / Nitrite: x   Leuk Esterase: x / RBC: x / WBC x   Sq Epi: x / Non Sq Epi: x / Bacteria: x            MICROBIOLOGY:       RADIOLOGY:  [ ] Reviewed and interpreted by me    PULMONARY FUNCTION TESTS:    EKG:

## 2023-09-25 NOTE — CONSULT NOTE ADULT - ATTENDING COMMENTS
Patient with Non-Hodgkin's Lymphoma on Rituxan, with hx of peripheral eosinophilia, found to have entero/rhinovirus, blastomyces in stool O&P on metronidazole, with Patient with Non-Hodgkin's Lymphoma on Rituxan, with hx of peripheral eosinophilia, found to have entero/rhinovirus, blastomyces in stool O&P on metronidazole, with RLL consolidation on CT chest. pt is clinically improving; would not start steroids. pt is immunocompromised, differential dx: viral pna, blastomyces pulmonary involvement? (already on treatment), chronic eosinophilic pna (not consistent, able to wean down O2, not on steroids currently), lymphoma pulmonary involvement? Would continue course as pt is improving, if any deterioration, would consider bronchoscopy. check sputum O&P.

## 2023-09-25 NOTE — CONSULT NOTE ADULT - PROBLEM SELECTOR RECOMMENDATION 9
Pt presents with cough for 1 month.  Febrile in the ED with mild leukocytosis.  On rituximab therapy, last dose August 22nd.  S/p fimoxyclav 7 day course without improvement in symptoms.  Clear Xray.  Basilar opacities noted on CT Chest, official read pending.  RVP positive for Rhinovirus.  Quantiferon and BCx pending.  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
Patient currently admitted for pneumonia, in the setting of viral infection or possibly eosinophilic pna. Patient has been evaluated by ID and pulmonary teams, currently on Zosyn/Flagyl/entecavir and TB ruled out.  At this time pending further diagnostic workup  Patient shared that bronchoscopy is a possibility if he fails to improve  -Supplemental oxygen and antibiotics per primary/ID teams

## 2023-09-26 DIAGNOSIS — Z51.5 ENCOUNTER FOR PALLIATIVE CARE: ICD-10-CM

## 2023-09-26 DIAGNOSIS — R11.2 NAUSEA WITH VOMITING, UNSPECIFIED: ICD-10-CM

## 2023-09-26 DIAGNOSIS — R06.00 DYSPNEA, UNSPECIFIED: ICD-10-CM

## 2023-09-26 DIAGNOSIS — Z71.89 OTHER SPECIFIED COUNSELING: ICD-10-CM

## 2023-09-26 LAB
1,3 BETA GLUCAN SER QL: NEGATIVE — SIGNIFICANT CHANGE UP
A FLAVUS AB FLD QL: NEGATIVE — SIGNIFICANT CHANGE UP
A NIGER AB FLD QL: NEGATIVE — SIGNIFICANT CHANGE UP
A NIGER AB FLD QL: NEGATIVE — SIGNIFICANT CHANGE UP
ALBUMIN SERPL ELPH-MCNC: 3.2 G/DL — LOW (ref 3.3–5)
ALP SERPL-CCNC: 134 U/L — HIGH (ref 40–120)
ALT FLD-CCNC: 28 U/L — SIGNIFICANT CHANGE UP (ref 4–41)
ANION GAP SERPL CALC-SCNC: 8 MMOL/L — SIGNIFICANT CHANGE UP (ref 7–14)
AST SERPL-CCNC: 27 U/L — SIGNIFICANT CHANGE UP (ref 4–40)
BASOPHILS # BLD AUTO: 0.09 K/UL — SIGNIFICANT CHANGE UP (ref 0–0.2)
BASOPHILS NFR BLD AUTO: 0.6 % — SIGNIFICANT CHANGE UP (ref 0–2)
BILIRUB SERPL-MCNC: <0.2 MG/DL — SIGNIFICANT CHANGE UP (ref 0.2–1.2)
BUN SERPL-MCNC: 10 MG/DL — SIGNIFICANT CHANGE UP (ref 7–23)
C IMMITIS AB SER QL IA: NEGATIVE — SIGNIFICANT CHANGE UP
CALCIUM SERPL-MCNC: 9.2 MG/DL — SIGNIFICANT CHANGE UP (ref 8.4–10.5)
CHLORIDE SERPL-SCNC: 102 MMOL/L — SIGNIFICANT CHANGE UP (ref 98–107)
CO2 SERPL-SCNC: 27 MMOL/L — SIGNIFICANT CHANGE UP (ref 22–31)
CREAT SERPL-MCNC: 1.28 MG/DL — SIGNIFICANT CHANGE UP (ref 0.5–1.3)
EGFR: 62 ML/MIN/1.73M2 — SIGNIFICANT CHANGE UP
EOSINOPHIL # BLD AUTO: 10.71 K/UL — HIGH (ref 0–0.5)
EOSINOPHIL NFR BLD AUTO: 69 % — HIGH (ref 0–6)
FUNGITELL: <31 PG/ML — SIGNIFICANT CHANGE UP
GLUCOSE SERPL-MCNC: 110 MG/DL — HIGH (ref 70–99)
H CAPSUL AG SER IA-MCNC: SIGNIFICANT CHANGE UP
HCT VFR BLD CALC: 39.8 % — SIGNIFICANT CHANGE UP (ref 39–50)
HGB BLD-MCNC: 12.8 G/DL — LOW (ref 13–17)
IANC: 2.74 K/UL — SIGNIFICANT CHANGE UP (ref 1.8–7.4)
IMM GRANULOCYTES NFR BLD AUTO: 1.9 % — HIGH (ref 0–0.9)
LYMPHOCYTES # BLD AUTO: 0.92 K/UL — LOW (ref 1–3.3)
LYMPHOCYTES # BLD AUTO: 5.9 % — LOW (ref 13–44)
MAGNESIUM SERPL-MCNC: 2.4 MG/DL — SIGNIFICANT CHANGE UP (ref 1.6–2.6)
MCHC RBC-ENTMCNC: 29.8 PG — SIGNIFICANT CHANGE UP (ref 27–34)
MCHC RBC-ENTMCNC: 32.2 GM/DL — SIGNIFICANT CHANGE UP (ref 32–36)
MCV RBC AUTO: 92.8 FL — SIGNIFICANT CHANGE UP (ref 80–100)
MONOCYTES # BLD AUTO: 0.76 K/UL — SIGNIFICANT CHANGE UP (ref 0–0.9)
MONOCYTES NFR BLD AUTO: 4.9 % — SIGNIFICANT CHANGE UP (ref 2–14)
NEUTROPHILS # BLD AUTO: 2.74 K/UL — SIGNIFICANT CHANGE UP (ref 1.8–7.4)
NEUTROPHILS NFR BLD AUTO: 17.7 % — LOW (ref 43–77)
NRBC # BLD: 0 /100 WBCS — SIGNIFICANT CHANGE UP (ref 0–0)
NRBC # FLD: 0 K/UL — SIGNIFICANT CHANGE UP (ref 0–0)
PHOSPHATE SERPL-MCNC: 4.1 MG/DL — SIGNIFICANT CHANGE UP (ref 2.5–4.5)
PLATELET # BLD AUTO: 351 K/UL — SIGNIFICANT CHANGE UP (ref 150–400)
POTASSIUM SERPL-MCNC: 4 MMOL/L — SIGNIFICANT CHANGE UP (ref 3.5–5.3)
POTASSIUM SERPL-SCNC: 4 MMOL/L — SIGNIFICANT CHANGE UP (ref 3.5–5.3)
PROT SERPL-MCNC: 6.1 G/DL — SIGNIFICANT CHANGE UP (ref 6–8.3)
RBC # BLD: 4.29 M/UL — SIGNIFICANT CHANGE UP (ref 4.2–5.8)
RBC # FLD: 13.6 % — SIGNIFICANT CHANGE UP (ref 10.3–14.5)
SODIUM SERPL-SCNC: 137 MMOL/L — SIGNIFICANT CHANGE UP (ref 135–145)
T SPIRALIS AB SER-ACNC: NEGATIVE — SIGNIFICANT CHANGE UP
WBC # BLD: 15.52 K/UL — HIGH (ref 3.8–10.5)
WBC # FLD AUTO: 15.52 K/UL — HIGH (ref 3.8–10.5)

## 2023-09-26 PROCEDURE — 99497 ADVNCD CARE PLAN 30 MIN: CPT

## 2023-09-26 PROCEDURE — 99498 ADVNCD CARE PLAN ADDL 30 MIN: CPT

## 2023-09-26 PROCEDURE — 99232 SBSQ HOSP IP/OBS MODERATE 35: CPT

## 2023-09-26 RX ADMIN — Medication 750 MILLIGRAM(S): at 14:39

## 2023-09-26 RX ADMIN — ENTECAVIR 0.5 MILLIGRAM(S): 0.5 TABLET ORAL at 19:03

## 2023-09-26 RX ADMIN — Medication 750 MILLIGRAM(S): at 06:25

## 2023-09-26 RX ADMIN — Medication 100 MILLIGRAM(S): at 14:39

## 2023-09-26 RX ADMIN — Medication 750 MILLIGRAM(S): at 22:36

## 2023-09-26 RX ADMIN — PIPERACILLIN AND TAZOBACTAM 25 GRAM(S): 4; .5 INJECTION, POWDER, LYOPHILIZED, FOR SOLUTION INTRAVENOUS at 22:36

## 2023-09-26 RX ADMIN — Medication 100 MILLIGRAM(S): at 22:41

## 2023-09-26 RX ADMIN — PIPERACILLIN AND TAZOBACTAM 25 GRAM(S): 4; .5 INJECTION, POWDER, LYOPHILIZED, FOR SOLUTION INTRAVENOUS at 06:25

## 2023-09-26 RX ADMIN — Medication 5 MILLIGRAM(S): at 06:25

## 2023-09-26 RX ADMIN — ENOXAPARIN SODIUM 40 MILLIGRAM(S): 100 INJECTION SUBCUTANEOUS at 22:36

## 2023-09-26 RX ADMIN — Medication 400 MILLIGRAM(S): at 19:03

## 2023-09-26 RX ADMIN — Medication 400 MILLIGRAM(S): at 06:25

## 2023-09-26 RX ADMIN — Medication 3 MILLIGRAM(S): at 22:35

## 2023-09-26 RX ADMIN — TAMSULOSIN HYDROCHLORIDE 0.4 MILLIGRAM(S): 0.4 CAPSULE ORAL at 22:36

## 2023-09-26 RX ADMIN — PIPERACILLIN AND TAZOBACTAM 25 GRAM(S): 4; .5 INJECTION, POWDER, LYOPHILIZED, FOR SOLUTION INTRAVENOUS at 14:39

## 2023-09-26 NOTE — PROGRESS NOTE ADULT - CONVERSATION DETAILS
Palliative care consulted for assistance with goals of care.    Met with patient this morning, his brother was present today. Patient shared that overall he is doing better today. We introduced the role of the palliative care team. Patient shared that he has not completed any advance directives previously or completed any health care agent. He shared that he has completed a will related to his estate but has not had any discussions related to his wishes regarding end of life. He reiterated that any decisions regarding his medical care, they make as a family - he, his wife, their children and his extended family including patients siblings.     When asked about resuscitation including CPR and intubation he shared that he has not had any conversations related to this. We shared that it would be best to make such decisions/conversations while he's well and able to express his wishes. We shared that while we are hopeful that he is able to recover from current illness and able ot return home, we worry that because of his underlying disease, if his heart were to stop and he were to undergo resuscitation, it might cause more harm than benefits. He shared that while he recognizes that his body is not well "not normal" due to the cancer and the chemo he has received, as far as end of life, things are beyond his control and that it would depend on the situation. He shared that he would hope that his doctors would act in his best interest and speak to his family and make a decision as appropriate. He shared that he has spoken to his family about burial plans including following Islamic traditions post death.     We advised patient to have follow up conversations with his family based on our conversations today so that they can be better prepared and make decisions together as per family wishes.

## 2023-09-26 NOTE — PROGRESS NOTE ADULT - ASSESSMENT
63 m with non hodgkin's lymphoma on rituxan last 8/28, has had cough, SOB which started a month ago and got worse from dry cough to productive, also has no appetite with intermittent diarrhea, vomiting and abd pain, he just came back from a16 day trip to [facility] but his symptoms started before the trip and was given antibiotics in [facility], initially felt better but then again worsened   here febrile to 100.7  WBC: 14, eso: 9.52  RVP: entero/rhino  CXR clear    immunocompromised pt with non hodgkin's lymphoma on ritixan with a month of cough and SOB, progressively worsening, also just came back from [facility] but symptoms started  before, here with fever and eosinophilia which was present before but now [id number], negative strongyloides toxocara, coccidioides, histo Ag  entero/rhino pneumonia vs eosinophilic pneumonia  CT with bibasilar pneumonia  the whole picture is not s/o TB and quantiferon negative, AFB smear x 3 negative  pt had some diarrhea and O&P now with moderate blastomyces hominis      * blastomyces is usually non pathogenic but as pt is immunocompromised with diarrhea and eosinophilia will treat, c/w metro 750 tid for 10 days  * complete the 7 day course of zosyn today  * f/u with hem/onc and pulm  * monitor CBc/diff and CMP      The above assessment and plan was discussed with the primary team    Malou Cortes MD  contact on teams  After 5pm and on weekends call [phone]

## 2023-09-26 NOTE — PROGRESS NOTE ADULT - SUBJECTIVE AND OBJECTIVE BOX
Patient is a 63y old  Male who presents with a chief complaint of respiratory infection (25 Sep 2023 18:08)    Sinan Cormier MD   [facility]   Pager [phone]  Reachable on Microsoft Teams     SUBJECTIVE / OVERNIGHT EVENTS:  Patient seen and examined this morning. No events overnight.   States that he is feeling slightly better today.   No fevers, chills, nausea or vomiting     MEDICATIONS  (STANDING):  acyclovir   Oral Tab/Cap 400 milliGRAM(s) Oral two times a day  enoxaparin Injectable 40 milliGRAM(s) SubCutaneous every 24 hours  entecavir 0.5 milliGRAM(s) Oral daily  metroNIDAZOLE    Tablet 750 milliGRAM(s) Oral three times a day  oxybutynin XL 5 milliGRAM(s) Oral daily  piperacillin/tazobactam IVPB.. 3.375 Gram(s) IV Intermittent every 8 hours  tamsulosin 0.4 milliGRAM(s) Oral at bedtime    MEDICATIONS  (PRN):  acetaminophen     Tablet .. 650 milliGRAM(s) Oral every 6 hours PRN Temp greater or equal to 38C (100.4F), Mild Pain (1 - 3)  albuterol/ipratropium for Nebulization 3 milliLiter(s) Nebulizer every 6 hours PRN Bronchospasm  benzonatate 100 milliGRAM(s) Oral every 8 hours PRN Cough  melatonin 3 milliGRAM(s) Oral at bedtime PRN Insomnia      Vital Signs Last 24 Hrs  T(C): 36.7 (26 Sep 2023 11:20), Max: 37.1 (25 Sep 2023 18:04)  T(F): 98 (26 Sep 2023 11:20), Max: 98.8 (25 Sep 2023 18:04)  HR: 90 (26 Sep 2023 11:30) (86 - 101)  BP: 115/60 (26 Sep 2023 11:20) (101/71 - 115/60)  BP(mean): --  RR: 18 (26 Sep 2023 11:20) (17 - 18)  SpO2: 98% (26 Sep 2023 11:20) (95% - 98%)  CAPILLARY BLOOD GLUCOSE        I&O's Summary      General: man laying down in bed appears comfortable in NAD, awake and alert  HENMT: MMM   Respiratory: No respiratory distress, decreased breath sounds in the bases, No wheezing.  Cardiovascular: S1,S2; No m/g/r.  Gastrointestinal: Soft, Nontender, Nondistended; +BS.   Extremities: No c/c/e; warm to touch  Skin: No rashes, No erythema   Psych: appropriate mood and affect    LABS:                        12.8   15.52 )-----------( 351      ( 26 Sep 2023 06:00 )             39.8     09-26    137  |  102  |  10  ----------------------------<  110<H>  4.0   |  27  |  1.28    Ca    9.2      26 Sep 2023 06:00  Phos  4.1     09-26  Mg     2.40     09-26    TPro  6.1  /  Alb  3.2<L>  /  TBili  <0.2  /  DBili  x   /  AST  27  /  ALT  28  /  AlkPhos  134<H>  09-26          Urinalysis Basic - ( 26 Sep 2023 06:00 )    Color: x / Appearance: x / SG: x / pH: x  Gluc: 110 mg/dL / Ketone: x  / Bili: x / Urobili: x   Blood: x / Protein: x / Nitrite: x   Leuk Esterase: x / RBC: x / WBC x   Sq Epi: x / Non Sq Epi: x / Bacteria: x        RADIOLOGY & ADDITIONAL TESTS:    Imaging Personally Reviewed:    Consultant(s) Notes Reviewed:      Care Discussed with Consultants/Other Providers:

## 2023-09-26 NOTE — PROGRESS NOTE ADULT - SUBJECTIVE AND OBJECTIVE BOX
[facility] Palliative [facility]  Davon Morrison MD, Palliative Care Attending  Contact Info: Page [phone] (including Nights/Weekends), message on Microsoft Teams (Davon Morrison MD), or leave VM at Palliative Office [phone] (non-urgent)   Date of [id number] @ 16:33    SUBJECTIVE AND OBJECTIVE:    This provider shares common language with patient, patient prefers to speak to provider in [facility].     Patient seen this morning, he was accompanied by his brother. He was ambulating in the hallway initially without supplemental oxygen. Shares overall feeling better this morning. Denies pain/dyspnea and any nausea/vomiting. Was able to tolerate breakfast brought from home.     DNR on chart: no     Allergies    No Known Allergies    Intolerances    MEDICATIONS  (STANDING):  acyclovir   Oral Tab/Cap 400 milliGRAM(s) Oral two times a day  enoxaparin Injectable 40 milliGRAM(s) SubCutaneous every 24 hours  entecavir 0.5 milliGRAM(s) Oral daily  metroNIDAZOLE    Tablet 750 milliGRAM(s) Oral three times a day  oxybutynin XL 5 milliGRAM(s) Oral daily  piperacillin/tazobactam IVPB.. 3.375 Gram(s) IV Intermittent every 8 hours  tamsulosin 0.4 milliGRAM(s) Oral at bedtime    MEDICATIONS  (PRN):  acetaminophen     Tablet .. 650 milliGRAM(s) Oral every 6 hours PRN Temp greater or equal to 38C (100.4F), Mild Pain (1 - 3)  albuterol/ipratropium for Nebulization 3 milliLiter(s) Nebulizer every 6 hours PRN Bronchospasm  benzonatate 100 milliGRAM(s) Oral every 8 hours PRN Cough  melatonin 3 milliGRAM(s) Oral at bedtime PRN Insomnia      ITEMS UNCHECKED ARE NOT PRESENT    PRESENT SYMPTOMS: [ ]Unable to self-report - see [ ] CPOT [ ] PAINADS [ ] RDOS  Source if other than patient:  [ ]Family   [ ]Team     Pain:  [ ]yes [x ]no  QOL impact -   Location -                    Aggravating factors -  Quality -  Radiation -  Timing-  Severity (0-10 scale):  Minimal acceptable level/ pain goal (0-10 scale):     CPOT:    https://www.sccm.org/getattachment/txe01r34-8f5s-4s7f-7m7w-0829z8251n6x/Critical-Care-Pain-Observation-Tool-(CPOT)    Dyspnea:                           [ ]Mild [ ]Moderate [ ]Severe  Anxiety:                             [ ]Mild [ ]Moderate [ ]Severe  Fatigue:                             [ ]Mild [ ]Moderate [ ]Severe  Nausea:                             [ ]Mild [ ]Moderate [ ]Severe  Loss of appetite:              [ ]Mild [ ]Moderate [ ]Severe  Constipation:                    [ ]Mild [ ]Moderate [ ]Severe  Other Symptoms:  [x ]All other review of systems negative     PCSSQ[Palliative Care Spiritual Screening Question]   Severity (0-10):  Score of 4 or > indicate consideration of Chaplaincy referral.  Chaplaincy Referral: [ ] yes [ ] refused [ ] following [ ] deferred    Caregiver Joes? : [ ] yes [ ] no [ ] Deferred [ ] Declined             Social work referral [ ] Patient & Family Centered Care Referral [ ]  Anticipatory Grief present?:  [ ] yes [ ] no  [ ] Deferred                  Social work referral [ ] Patient & Family Centered Care Referral [ ]      PHYSICAL EXAM:  Vital Signs Last 24 Hrs  T(C): 36.7 (26 Sep 2023 11:20), Max: 37.1 (25 Sep 2023 18:04)  T(F): 98 (26 Sep 2023 11:20), Max: 98.8 (25 Sep 2023 18:04)  HR: 90 (26 Sep 2023 11:30) (86 - 101)  BP: 115/60 (26 Sep 2023 11:20) (101/71 - 115/60)  BP(mean): --  RR: 18 (26 Sep 2023 11:20) (17 - 18)  SpO2: 98% (26 Sep 2023 11:20) (95% - 98%)    Parameters below as of 26 Sep 2023 11:20  Patient On (Oxygen Delivery Method): nasal cannula     I&O's Summary     GENERAL: [ ]Cachexia    [x ]Alert  [ ]Oriented x3   [ ]Lethargic  [ ]Unarousable  [ ]Verbal  [ ]Non-Verbal  Behavioral:   [ ]Anxiety  [ ]Delirium [ ]Agitation [ ]Other  HEENT:  [x ]Normal   [ ]Dry mouth   [ ]ET Tube/Trach  [ ]Oral lesions  PULMONARY: able to complete full sentences without resp distress   [x ]Clear [ ]Tachypnea  [ ]Audible excessive secretions   [ ]Rhonchi        [ ]Right [ ]Left [ ]Bilateral  [ ]Crackles        [ ]Right [ ]Left [ ]Bilateral  [ ]Wheezing     [ ]Right [ ]Left [ ]Bilateral  [ ]Diminished BS [ ] Right [ ]Left [ ]Bilateral  CARDIOVASCULAR:    [ ]Regular [ ]Irregular [ ]Tachy  [ ]Costa [ ]Murmur [ ]Other  GASTROINTESTINAL:  [x ]Soft  [ ]Distended   [ ]+BS  [ ]Non tender [ ]Tender  [ ]Other [ ]PEG [ ]OGT/ NGT   Last BM:   GENITOURINARY:  [ ]Normal [ ]Incontinent   [ ]Oliguria/Anuria   [ ]Alvarez  MUSCULOSKELETAL:   [ ]Normal   [ ]Weakness  [ ]Bed/Wheelchair bound [ ]Edema  NEUROLOGIC:   [ ]No focal deficits  [ ] Cognitive impairment  [ ] Dysphagia [ ]Dysarthria [ ] Paresis [ ]Other   SKIN: Please see flowsheets   [x ]Normal  [ ]Rash  [ ]Other  [ ]Pressure ulcer(s) [ ]y [ ]n present on admission      LABS:                        12.8   15.52 )-----------( 351      ( 26 Sep 2023 06:00 )             39.8   09-26    137  |  102  |  10  ----------------------------<  110<H>  4.0   |  27  |  1.28    Ca    9.2      26 Sep 2023 06:00  Phos  4.1     09-26  Mg     2.40     09-26    TPro  6.1  /  Alb  3.2<L>  /  TBili  <0.2  /  DBili  x   /  AST  27  /  ALT  28  /  AlkPhos  134<H>  09-26      Urinalysis Basic - ( 26 Sep 2023 06:00 )    Color: x / Appearance: x / SG: x / pH: x  Gluc: 110 mg/dL / Ketone: x  / Bili: x / Urobili: x   Blood: x / Protein: x / Nitrite: x   Leuk Esterase: x / RBC: x / WBC x   Sq Epi: x / Non Sq Epi: x / Bacteria: x      RADIOLOGY & ADDITIONAL STUDIES:    Protein Calorie Malnutrition Present: [ ]mild [ ]moderate [ ]severe [ ]underweight [ ]morbid obesity  https://www.andeal.org/vault/3359/web/files/ONC/Table_Clinical%20Characteristics%20to%20Document%20Malnutrition-White%20JV%20et%20al%728990.pdf    Height (cm): 170.2 (10-29-22 @ 03:08)  Weight (kg): 71.3 (04-14-23 @ 21:00), 63.9 (10-29-22 @ 03:08)  BMI (kg/m2): 24.6 (04-14-23 @ 21:00), 22.1 (10-29-22 @ 03:08)    [ ]PPSV2 < or = 30%  [ ]significant weight loss [ ]poor nutritional intake [ ]anasarca[ ]Artificial Nutrition    Other REFERRALS:  [ ]Hospice  [ ]Child Life  [ ]Social Work  [ ]Case management [ ]Holistic Therapy     Goals of Care Document:

## 2023-09-26 NOTE — PROGRESS NOTE ADULT - ASSESSMENT
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 Symptoms began before going to [facility] improved there with abx, but did not completely resolve. Also c/o nausea, vomiting, loose stool, anorexia, weakness, insomnia. On presentation found to be febrile with leukocytosis, + Rhinovirus on RVP.  Patient requiring supplemental oxygen. Palliative care consulted for goals/supportive care. I met with patient and his family at bedside. He shared that overall he's been improving somewhat from a breathing standpoint. He is hoping for continued improvement and going home.

## 2023-09-26 NOTE — PROGRESS NOTE ADULT - SUBJECTIVE AND OBJECTIVE BOX
Follow Up:  pneumonia, eosinophilia    Interval History/ROS: pt afebrile, improved cough and SOB, no diarrhea        Allergies  No Known Allergies        ANTIMICROBIALS:  acyclovir   Oral Tab/Cap 400 two times a day  entecavir 0.5 daily  metroNIDAZOLE    Tablet 750 three times a day  piperacillin/tazobactam IVPB.. 3.375 every 8 hours      OTHER MEDS:  acetaminophen     Tablet .. 650 milliGRAM(s) Oral every 6 hours PRN  albuterol/ipratropium for Nebulization 3 milliLiter(s) Nebulizer every 6 hours PRN  benzonatate 100 milliGRAM(s) Oral every 8 hours PRN  enoxaparin Injectable 40 milliGRAM(s) SubCutaneous every 24 hours  melatonin 3 milliGRAM(s) Oral at bedtime PRN  oxybutynin XL 5 milliGRAM(s) Oral daily  tamsulosin 0.4 milliGRAM(s) Oral at bedtime      Vital Signs Last 24 Hrs  T(C): 36.7 (26 Sep 2023 11:20), Max: 37.1 (25 Sep 2023 18:04)  T(F): 98 (26 Sep 2023 11:20), Max: 98.8 (25 Sep 2023 18:04)  HR: 90 (26 Sep 2023 11:30) (86 - 101)  BP: 115/60 (26 Sep 2023 11:20) (101/71 - 115/60)  BP(mean): --  RR: 18 (26 Sep 2023 11:20) (17 - 18)  SpO2: 98% (26 Sep 2023 11:20) (95% - 98%)    Parameters below as of 26 Sep 2023 11:20  Patient On (Oxygen Delivery Method): nasal cannula        Physical Exam:  General:    NAD,  non toxic  Respiratory:    comfortable on NC  abd:     soft,   no tenderness  :   no CVAT,  no suprapubic tenderness,   no  miller  Musculoskeletal:   no joint swelling  vascular: no phlebitis  Skin:    no rash                          12.8   15.52 )-----------( 351      ( 26 Sep 2023 06:00 )             39.8       09-26    137  |  102  |  10  ----------------------------<  110<H>  4.0   |  27  |  1.28    Ca    9.2      26 Sep 2023 06:00  Phos  4.1     09-26  Mg     2.40     09-26    TPro  6.1  /  Alb  3.2<L>  /  TBili  <0.2  /  DBili  x   /  AST  27  /  ALT  28  /  AlkPhos  134<H>  09-26      Urinalysis Basic - ( 26 Sep 2023 06:00 )    Color: x / Appearance: x / SG: x / pH: x  Gluc: 110 mg/dL / Ketone: x  / Bili: x / Urobili: x   Blood: x / Protein: x / Nitrite: x   Leuk Esterase: x / RBC: x / WBC x   Sq Epi: x / Non Sq Epi: x / Bacteria: x        MICROBIOLOGY:  v  .Sputum Sputum  09-21-23   Culture is being performed.  --  --      .Sputum Sputum  09-21-23   Culture is being performed.  --  --      .Sputum Sputum  09-20-23   Culture is being performed.  --  --      .Blood Blood-Peripheral  09-20-23   No growth at 5 days  --  --      .Blood Blood-Peripheral  09-20-23   No growth at 5 days  --  --      .Sputum Sputum  09-20-23   Normal Respiratory Ritu present  --    Few polymorphonuclear leukocytes per low power field  Moderate Squamous epithelial cells per low power field  Moderate Gram Positive Cocci in Pairs and Chains per oil power field  Few Gram Positive Rods per oil power field  Few Gram Negative Rods per oil power field          Rapid RVP Result: Detected (09-19 @ 21:48)        RADIOLOGY:  Images independently visualized and reviewed personally, findings as below  < from: CT Abdomen and Pelvis w/ IV Cont (09.22.23 @ 19:14) >  IMPRESSION:  Right lower lobe pneumonia and left basilar opacity which could represent   atelectasis versus pneumonia.    Retroperitoneal and mesenteric soft tissue masses, without significant   change overall since prior study, in keeping with history of lymphoma.    Questionable rectal and descending colon wall thickening. Correlate for   symptoms of proctocolitis.    < end of copied text >

## 2023-09-27 LAB
ALBUMIN SERPL ELPH-MCNC: 3.3 G/DL — SIGNIFICANT CHANGE UP (ref 3.3–5)
ALP SERPL-CCNC: 128 U/L — HIGH (ref 40–120)
ALT FLD-CCNC: 26 U/L — SIGNIFICANT CHANGE UP (ref 4–41)
ANION GAP SERPL CALC-SCNC: 9 MMOL/L — SIGNIFICANT CHANGE UP (ref 7–14)
AST SERPL-CCNC: 17 U/L — SIGNIFICANT CHANGE UP (ref 4–40)
BASOPHILS # BLD AUTO: 0.11 K/UL — SIGNIFICANT CHANGE UP (ref 0–0.2)
BASOPHILS NFR BLD AUTO: 0.7 % — SIGNIFICANT CHANGE UP (ref 0–2)
BILIRUB SERPL-MCNC: <0.2 MG/DL — SIGNIFICANT CHANGE UP (ref 0.2–1.2)
BUN SERPL-MCNC: 13 MG/DL — SIGNIFICANT CHANGE UP (ref 7–23)
CALCIUM SERPL-MCNC: 9 MG/DL — SIGNIFICANT CHANGE UP (ref 8.4–10.5)
CHLORIDE SERPL-SCNC: 104 MMOL/L — SIGNIFICANT CHANGE UP (ref 98–107)
CO2 SERPL-SCNC: 26 MMOL/L — SIGNIFICANT CHANGE UP (ref 22–31)
CREAT SERPL-MCNC: 1.18 MG/DL — SIGNIFICANT CHANGE UP (ref 0.5–1.3)
EGFR: 68 ML/MIN/1.73M2 — SIGNIFICANT CHANGE UP
EOSINOPHIL # BLD AUTO: 11.15 K/UL — HIGH (ref 0–0.5)
EOSINOPHIL NFR BLD AUTO: 67.8 % — HIGH (ref 0–6)
FILARIA IGG SER-ACNC: 0.74 INDEX — SIGNIFICANT CHANGE UP
GLUCOSE SERPL-MCNC: 113 MG/DL — HIGH (ref 70–99)
HCT VFR BLD CALC: 40.3 % — SIGNIFICANT CHANGE UP (ref 39–50)
HGB BLD-MCNC: 13.3 G/DL — SIGNIFICANT CHANGE UP (ref 13–17)
IANC: 2.57 K/UL — SIGNIFICANT CHANGE UP (ref 1.8–7.4)
IMM GRANULOCYTES NFR BLD AUTO: 2.4 % — HIGH (ref 0–0.9)
LYMPHOCYTES # BLD AUTO: 1.17 K/UL — SIGNIFICANT CHANGE UP (ref 1–3.3)
LYMPHOCYTES # BLD AUTO: 7.1 % — LOW (ref 13–44)
MAGNESIUM SERPL-MCNC: 2.6 MG/DL — SIGNIFICANT CHANGE UP (ref 1.6–2.6)
MCHC RBC-ENTMCNC: 30.2 PG — SIGNIFICANT CHANGE UP (ref 27–34)
MCHC RBC-ENTMCNC: 33 GM/DL — SIGNIFICANT CHANGE UP (ref 32–36)
MCV RBC AUTO: 91.6 FL — SIGNIFICANT CHANGE UP (ref 80–100)
MONOCYTES # BLD AUTO: 1.05 K/UL — HIGH (ref 0–0.9)
MONOCYTES NFR BLD AUTO: 6.4 % — SIGNIFICANT CHANGE UP (ref 2–14)
NEUTROPHILS # BLD AUTO: 2.57 K/UL — SIGNIFICANT CHANGE UP (ref 1.8–7.4)
NEUTROPHILS NFR BLD AUTO: 15.6 % — LOW (ref 43–77)
NRBC # BLD: 0 /100 WBCS — SIGNIFICANT CHANGE UP (ref 0–0)
NRBC # FLD: 0 K/UL — SIGNIFICANT CHANGE UP (ref 0–0)
PHOSPHATE SERPL-MCNC: 3.7 MG/DL — SIGNIFICANT CHANGE UP (ref 2.5–4.5)
PLATELET # BLD AUTO: 366 K/UL — SIGNIFICANT CHANGE UP (ref 150–400)
POTASSIUM SERPL-MCNC: 4.2 MMOL/L — SIGNIFICANT CHANGE UP (ref 3.5–5.3)
POTASSIUM SERPL-SCNC: 4.2 MMOL/L — SIGNIFICANT CHANGE UP (ref 3.5–5.3)
PROT SERPL-MCNC: 6.1 G/DL — SIGNIFICANT CHANGE UP (ref 6–8.3)
RBC # BLD: 4.4 M/UL — SIGNIFICANT CHANGE UP (ref 4.2–5.8)
RBC # FLD: 13.4 % — SIGNIFICANT CHANGE UP (ref 10.3–14.5)
SCHISTOSOMA IGG SER-ACNC: <1 — SIGNIFICANT CHANGE UP
SODIUM SERPL-SCNC: 139 MMOL/L — SIGNIFICANT CHANGE UP (ref 135–145)
WBC # BLD: 16.45 K/UL — HIGH (ref 3.8–10.5)
WBC # FLD AUTO: 16.45 K/UL — HIGH (ref 3.8–10.5)

## 2023-09-27 PROCEDURE — 99233 SBSQ HOSP IP/OBS HIGH 50: CPT | Mod: GC

## 2023-09-27 PROCEDURE — 99232 SBSQ HOSP IP/OBS MODERATE 35: CPT

## 2023-09-27 RX ADMIN — PIPERACILLIN AND TAZOBACTAM 25 GRAM(S): 4; .5 INJECTION, POWDER, LYOPHILIZED, FOR SOLUTION INTRAVENOUS at 15:20

## 2023-09-27 RX ADMIN — ENTECAVIR 0.5 MILLIGRAM(S): 0.5 TABLET ORAL at 15:20

## 2023-09-27 RX ADMIN — Medication 3 MILLIGRAM(S): at 21:30

## 2023-09-27 RX ADMIN — Medication 100 MILLIGRAM(S): at 21:30

## 2023-09-27 RX ADMIN — TAMSULOSIN HYDROCHLORIDE 0.4 MILLIGRAM(S): 0.4 CAPSULE ORAL at 21:31

## 2023-09-27 RX ADMIN — Medication 750 MILLIGRAM(S): at 15:20

## 2023-09-27 RX ADMIN — Medication 400 MILLIGRAM(S): at 07:03

## 2023-09-27 RX ADMIN — PIPERACILLIN AND TAZOBACTAM 25 GRAM(S): 4; .5 INJECTION, POWDER, LYOPHILIZED, FOR SOLUTION INTRAVENOUS at 06:58

## 2023-09-27 RX ADMIN — Medication 5 MILLIGRAM(S): at 07:02

## 2023-09-27 RX ADMIN — Medication 400 MILLIGRAM(S): at 19:16

## 2023-09-27 RX ADMIN — Medication 750 MILLIGRAM(S): at 07:01

## 2023-09-27 RX ADMIN — Medication 750 MILLIGRAM(S): at 21:31

## 2023-09-27 RX ADMIN — ENOXAPARIN SODIUM 40 MILLIGRAM(S): 100 INJECTION SUBCUTANEOUS at 21:31

## 2023-09-27 NOTE — PROGRESS NOTE ADULT - ASSESSMENT
66 yo male with HBV (on entecavir) and hx of Follicular Lymphoma in remission (treated with 6 cycles of Rituximab-Bendamustine, now on maintenance Ritxumab) admitted for an upper respiratory infection, found to have a peripheral eosinophilia. Hematology consulted for eosinophilia.    #Eosinophilia  - Per chart review, pt has had a persistent eosinophilia count since 2021 (ranging 2K-3K), but has never been worked up at his Hematologist's office. Differential includes allergic disorders, infectious (especially parasites), neoplasms, and Hypereosinophilic syndromes.  - B12 wnl.   - Tryptase 10.2, IgE low, troponin, ISAIAH –ve  -  flow cytometry-> Mild absolute monocytosis with >10% monocytes, eosinophilia, no significant neutrophilia, or neutropenia  - Appreciate ID input.    -  Hepatitis and HIV negative. Enterovirus +ve  - parasitic workup-> moderate blastocystis hominis cysts observed in stool-> started metro per ID   - Eosinophilia  most likely reactive from the parasitic infection, less likely that heme malignancy is the      #Follicular Lymphoma  - Pt follows with Dr. Alondra Mcgill ([phone]). Collateral history obtained from Dr. Mcgill's office. Pt was diagnosed with Follicular Lymphoma in early 2022. He was treated with 6 cycles of Rituximab-Bendamustine (March 2022 - August 2022) and transitioned to maintenance Rituximab every 8 weeks (last Dose 8/5/23)  - Please obtain CT Chest/Abdomen/Pelvis for staging scans. Patient had last scans in January 2023 and was due for repeat surveillance scans in July 2023.  - Outpatient follow up with Dr. Mcgill when patient is ready for d/c 68 yo male with HBV (on entecavir) and hx of Follicular Lymphoma in remission (treated with 6 cycles of Rituximab-Bendamustine, now on maintenance Ritxumab) admitted for an upper respiratory infection, found to have a peripheral eosinophilia. Hematology consulted for eosinophilia.    #Eosinophilia  - Per chart review, pt has had a persistent eosinophilia count since 2021 (ranging 2K-3K), but has never been worked up at his Hematologist's office. Differential includes allergic disorders, infectious (especially parasites), neoplasms, and Hypereosinophilic syndromes.  - B12 wnl.   - Tryptase 10.2, IgE low, troponin, ISAIAH –ve  -  flow cytometry-> Mild absolute monocytosis with >10% monocytes, eosinophilia, no significant neutrophilia, or neutropenia  - Appreciate ID input.    -  Hepatitis and HIV negative. Enterovirus +ve  - parasitic workup-> moderate blastocystis hominis cysts observed in stool-> started metro per ID   - Eosinophilia  likely reactive from the parasitic infection,  will need to r/o primary eosinophilic syndrome.   - Emailed path to f/up on PDGFR A, PDGFR B, FGFR 3, JAK2, Kit mutations.   - It's unclear if the respiratory symptoms are due to infection or hypereosinophilia; would consider getting pulm on board  - Will need to consider steroids to suppress eosinophilia if Ok per ID.      #Follicular Lymphoma  - Pt follows with Dr. Alondra Mcgill ([phone]). Collateral history obtained from Dr. Mcgill's office. Pt was diagnosed with Follicular Lymphoma in early 2022. He was treated with 6 cycles of Rituximab-Bendamustine (March 2022 - August 2022) and transitioned to maintenance Rituximab every 8 weeks (last Dose 8/5/23)  - Please obtain CT Chest/Abdomen/Pelvis for staging scans. Patient had last scans in January 2023 and was due for repeat surveillance scans in July 2023.  - Outpatient follow up with Dr. Mcgill when patient is ready for d/c 66 yo male with HBV (on entecavir) and hx of Follicular Lymphoma in remission (treated with 6 cycles of Rituximab-Bendamustine, now on maintenance Ritxumab) admitted for an upper respiratory infection, found to have a peripheral eosinophilia. Hematology consulted for eosinophilia.    #Eosinophilia  - Per chart review, pt has had a persistent eosinophilia count since 2021 (ranging 2K-3K), but has never been worked up at his Hematologist's office. Differential includes allergic disorders, infectious (especially parasites), neoplasms, and Hypereosinophilic syndromes.  - B12 wnl.   - Tryptase 10.2, IgE low, troponin, ISAIAH –ve  -  flow cytometry-> Mild absolute monocytosis with >10% monocytes, eosinophilia, no significant neutrophilia, or neutropenia  - Appreciate ID input.    -  Hepatitis and HIV negative. Enterovirus +ve  - parasitic workup-> moderate blastocystis hominis cysts observed in stool-> started metro per ID   - Eosinophilia  likely reactive from the parasitic infection,  will need to r/o primary eosinophilic syndrome.   - Emailed path to f/up on PDGFR A, PDGFR B, FGFR 3, JAK2, Kit mutations.   - It's unclear if the respiratory symptoms are due to infection or hypereosinophilia; would consider getting pulm on board  - Will need to consider steroids to suppress eosinophilia if Ok per ID.      #Follicular Lymphoma  - Pt follows with Dr. Alondra Mcgill ([phone]). Collateral history obtained from Dr. Mcgill's office. Pt was diagnosed with Follicular Lymphoma in early 2022. He was treated with 6 cycles of Rituximab-Bendamustine (March 2022 - August 2022) and transitioned to maintenance Rituximab every 8 weeks (last Dose 8/5/23)  - Please obtain CT Chest/Abdomen/Pelvis for staging scans. Patient had last scans in January 2023 and was due for repeat surveillance scans in July 2023.  - pls get immunoglobulin level - will possibly need IVIG if low given active infection  - Outpatient follow up with Dr. Mcgill when patient is ready for d/c 66 yo male with HBV (on entecavir) and hx of Follicular Lymphoma in remission (treated with 6 cycles of Rituximab-Bendamustine, now on maintenance Ritxumab) admitted for an upper respiratory infection, found to have a peripheral eosinophilia. Hematology consulted for eosinophilia.    #Eosinophilia  - Per chart review, pt has had a persistent eosinophilia count since 2021 (ranging 2K-3K), but has never been worked up at his Hematologist's office. Differential includes allergic disorders, infectious (especially parasites), neoplasms, and Hypereosinophilic syndromes.  - B12 wnl.   - Tryptase 10.2, IgE low, troponin, ISAIAH –ve  -  flow cytometry-> Mild absolute monocytosis with >10% monocytes, eosinophilia, no significant neutrophilia, or neutropenia  - Appreciate ID input.    -  Hepatitis and HIV negative. Enterovirus +ve  - parasitic workup-> moderate blastocystis hominis cysts observed in stool-> started metro per ID   - Eosinophilia  likely reactive from the parasitic infection,  will need to r/o primary eosinophilic syndrome.   - Emailed path to f/up on PDGFR A, PDGFR B, FGFR 3, JAK2, Kit mutations.   - Pls check serum immunoglobulin levels- if low, will consider giving IVIG given that pt may be immunocompromised   - It's unclear if the respiratory symptoms are due to infection or hypereosinophilia; would consider getting pulm on board-> pt's respiratory status is improving  - Will need to consider steroids to suppress eosinophilia if Ok per ID.      #Follicular Lymphoma  - Pt follows with Dr. Alondra Mcgill ([phone]). Collateral history obtained from Dr. Mcgill's office. Pt was diagnosed with Follicular Lymphoma in early 2022. He was treated with 6 cycles of Rituximab-Bendamustine (March 2022 - August 2022) and transitioned to maintenance Rituximab every 8 weeks (last Dose 8/5/23)  - Please obtain CT Chest/Abdomen/Pelvis for staging scans. Patient had last scans in January 2023 and was due for repeat surveillance scans in July 2023.  - pls get immunoglobulin level - will possibly need IVIG if low given active infection  - Outpatient follow up with Dr. Mcgill when patient is ready for d/c 68 yo male with HBV (on entecavir) and hx of Follicular Lymphoma in remission (treated with 6 cycles of Rituximab-Bendamustine, now on maintenance Ritxumab) admitted for an upper respiratory infection, found to have a peripheral eosinophilia. Hematology consulted for   eosinophilia.    #Eosinophilia  - Per chart review, pt has had a persistent eosinophilia count since 2021 (ranging 2K-3K), but has never been worked up at his Hematologist's office. Differential includes allergic disorders, infectious (especially parasites), neoplasms, and Hypereosinophilic syndromes.  - B12 wnl.   - Tryptase 10.2, IgE low, troponin, ISAIAH –ve  -  flow cytometry-> Mild absolute monocytosis with >10% monocytes, eosinophilia, no significant neutrophilia, or neutropenia  - Appreciate ID input.    -  Hepatitis and HIV negative. Enterovirus +ve  - parasitic workup-> moderate blastocystis hominis cysts observed in stool-> started metro per ID   - Eosinophilia  likely reactive from the parasitic infection,  will need to r/o primary eosinophilic syndrome.   - Emailed path to f/up on PDGFR A, PDGFR B, FGFR 3, JAK2, Kit mutations.   - Pls check serum immunoglobulin levels- if low, will consider giving IVIG given that pt may be immunocompromised   - It's unclear if the respiratory symptoms are due to infection or hypereosinophilia; would consider getting pulm on board-> pt's respiratory status is improving  - Will need to consider steroids to suppress eosinophilia if Ok per ID.      #Follicular Lymphoma  - Pt follows with Dr. Alondra Mcgill ([phone]). Collateral history obtained from Dr. Mcgill's office. Pt was diagnosed with Follicular Lymphoma in early 2022. He was treated with 6 cycles of Rituximab-Bendamustine (March 2022 - August 2022) and transitioned to maintenance Rituximab every 8 weeks (last Dose 8/5/23)  - Please obtain CT Chest/Abdomen/Pelvis for staging scans. Patient had last scans in January 2023 and was due for repeat surveillance scans in July 2023.  - pls get immunoglobulin level - will possibly need IVIG if low given active infection  - Outpatient follow up with Dr. Mcgill when patient is ready for d/c

## 2023-09-27 NOTE — PROGRESS NOTE ADULT - SUBJECTIVE AND OBJECTIVE BOX
Patient is a 63y old  Male who presents with a chief complaint of resp infection (27 Sep 2023 10:33)    Sinan Cormier MD   [facility] Division of Hospital Medicine   Pager [phone]  Reachable on [employer] Teams     SUBJECTIVE / OVERNIGHT EVENTS:  Patient seen and examined this morning. States that he is feeling better today, still with cough but improving.  No events overnight.     MEDICATIONS  (STANDING):  acyclovir   Oral Tab/Cap 400 milliGRAM(s) Oral two times a day  enoxaparin Injectable 40 milliGRAM(s) SubCutaneous every 24 hours  entecavir 0.5 milliGRAM(s) Oral daily  metroNIDAZOLE    Tablet 750 milliGRAM(s) Oral three times a day  oxybutynin XL 5 milliGRAM(s) Oral daily  piperacillin/tazobactam IVPB.. 3.375 Gram(s) IV Intermittent every 8 hours  tamsulosin 0.4 milliGRAM(s) Oral at bedtime    MEDICATIONS  (PRN):  acetaminophen     Tablet .. 650 milliGRAM(s) Oral every 6 hours PRN Temp greater or equal to 38C (100.4F), Mild Pain (1 - 3)  albuterol/ipratropium for Nebulization 3 milliLiter(s) Nebulizer every 6 hours PRN Bronchospasm  benzonatate 100 milliGRAM(s) Oral every 8 hours PRN Cough  melatonin 3 milliGRAM(s) Oral at bedtime PRN Insomnia      Vital Signs Last 24 Hrs  T(C): 36.7 (27 Sep 2023 10:34), Max: 37.2 (26 Sep 2023 18:30)  T(F): 98.1 (27 Sep 2023 10:34), Max: 98.9 (26 Sep 2023 18:30)  HR: 97 (27 Sep 2023 10:34) (75 - 97)  BP: 109/69 (27 Sep 2023 10:34) (102/66 - 109/69)  BP(mean): --  RR: 18 (27 Sep 2023 10:34) (18 - 18)  SpO2: 97% (27 Sep 2023 10:34) (96% - 98%)  CAPILLARY BLOOD GLUCOSE        I&O's Summary      General: man laying down in bed appears comfortable in NAD, awake and alert  HENMT: MMM   Respiratory: No respiratory distress, decreased breath sounds in the bases, No wheezing.  Cardiovascular: S1,S2; No m/g/r.  Gastrointestinal: Soft, Nontender, Nondistended; +BS.   Extremities: No c/c/e; warm to touch  Skin: No rashes, No erythema   Psych: appropriate mood and affect    LABS:                        13.3   16.45 )-----------( 366      ( 27 Sep 2023 07:18 )             40.3     09-27    139  |  104  |  13  ----------------------------<  113<H>  4.2   |  26  |  1.18    Ca    9.0      27 Sep 2023 07:18  Phos  3.7     09-27  Mg     2.60     09-27    TPro  6.1  /  Alb  3.3  /  TBili  <0.2  /  DBili  x   /  AST  17  /  ALT  26  /  AlkPhos  128<H>  09-27          Urinalysis Basic - ( 27 Sep 2023 07:18 )    Color: x / Appearance: x / SG: x / pH: x  Gluc: 113 mg/dL / Ketone: x  / Bili: x / Urobili: x   Blood: x / Protein: x / Nitrite: x   Leuk Esterase: x / RBC: x / WBC x   Sq Epi: x / Non Sq Epi: x / Bacteria: x        RADIOLOGY & ADDITIONAL TESTS:    Imaging Personally Reviewed:    Consultant(s) Notes Reviewed:      Care Discussed with Consultants/Other Providers:

## 2023-09-27 NOTE — PROGRESS NOTE ADULT - ASSESSMENT
63 m with non hodgkin's lymphoma on rituxan last 8/28, has had cough, SOB which started a month ago and got worse from dry cough to productive, also has no appetite with intermittent diarrhea, vomiting and abd pain, he just came back from a16 day trip to [place] but his symptoms started before the trip and was given antibiotics in [place], initially felt better but then again worsened   here febrile to 100.7  WBC: 14, eso: 9.52  RVP: entero/rhino  CXR clear    immunocompromised pt with non hodgkin's lymphoma on ritixan with a month of cough and SOB, progressively worsening, also just came back from [place] but symptoms started  before, here with fever and eosinophilia which was present before but now [id number], negative strongyloides toxocara, trichinella, coccidioides, histo Ag, aspergillus  entero/rhino pneumonia vs eosinophilic pneumonia  CT with bibasilar pneumonia  the whole picture is not s/o TB and quantiferon negative, AFB smear x 3 negative  pt had some diarrhea and O&P now with moderate blastomyces hominis      * blastomyces is usually non pathogenic but as pt is immunocompromised with diarrhea and eosinophilia will treat, c/w metro 750 tid for 10 days  * completed the 7 day course of zosyn   * f/u with hem/onc and pulm for hypereosinophilia 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

## 2023-09-27 NOTE — PROGRESS NOTE ADULT - SUBJECTIVE AND OBJECTIVE BOX
CHIEF COMPLAINT:Patient is a 63y old  Male who presents with a chief complaint of resp infection (26 Sep 2023 16:31)      Interval Events: no overnight events, continues to have improved breathing and cough. still has some JOSE    REVIEW OF SYSTEMS:  [x] All other systems negative except per HPI   [ ] Unable to assess ROS because ________    OBJECTIVE:  ICU Vital Signs Last 24 Hrs  T(C): 36.8 (27 Sep 2023 06:50), Max: 37.2 (26 Sep 2023 18:30)  T(F): 98.3 (27 Sep 2023 06:50), Max: 98.9 (26 Sep 2023 18:30)  HR: 82 (27 Sep 2023 06:50) (75 - 101)  BP: 104/71 (27 Sep 2023 06:50) (102/66 - 115/60)  BP(mean): --  ABP: --  ABP(mean): --  RR: 18 (27 Sep 2023 06:50) (18 - 18)  SpO2: 97% (27 Sep 2023 06:50) (96% - 98%)    O2 Parameters below as of 27 Sep 2023 06:50  Patient On (Oxygen Delivery Method): nasal cannula                PHYSICAL EXAM:  GENERAL: NAD, well-groomed, well-developed  HEAD:  Atraumatic, Normocephalic  EYES: EOMI, PERRLA, conjunctiva and sclera clear  ENMT: No tonsillar erythema, exudates, or enlargement; Moist mucous membranes, Good dentition, No lesions  NECK: Supple, No JVD, Normal thyroid  CHEST/LUNG: Clear to auscultation bilaterally; No rales, rhonchi, wheezing, or rubs  HEART: Regular rate and rhythm; No murmurs, rubs, or gallops  ABDOMEN: Soft, Nontender, Nondistended; Bowel sounds present  VASCULAR:  2+ Peripheral Pulses, No clubbing, cyanosis, or edema  LYMPH: No lymphadenopathy noted  SKIN: No rashes or lesions  NERVOUS SYSTEM:  Alert & Oriented X3, Good concentration; Motor Strength 5/5 B/L upper and lower extremities; DTRs 2+ intact and symmetric    HOSPITAL MEDICATIONS:  MEDICATIONS  (STANDING):  acyclovir   Oral Tab/Cap 400 milliGRAM(s) Oral two times a day  enoxaparin Injectable 40 milliGRAM(s) SubCutaneous every 24 hours  entecavir 0.5 milliGRAM(s) Oral daily  metroNIDAZOLE    Tablet 750 milliGRAM(s) Oral three times a day  oxybutynin XL 5 milliGRAM(s) Oral daily  piperacillin/tazobactam IVPB.. 3.375 Gram(s) IV Intermittent every 8 hours  tamsulosin 0.4 milliGRAM(s) Oral at bedtime    MEDICATIONS  (PRN):  acetaminophen     Tablet .. 650 milliGRAM(s) Oral every 6 hours PRN Temp greater or equal to 38C (100.4F), Mild Pain (1 - 3)  albuterol/ipratropium for Nebulization 3 milliLiter(s) Nebulizer every 6 hours PRN Bronchospasm  benzonatate 100 milliGRAM(s) Oral every 8 hours PRN Cough  melatonin 3 milliGRAM(s) Oral at bedtime PRN Insomnia      LABS:    The Labs were reviewed by me   The Radiology was reviewed by me    EKG tracing reviewed by me    09-27    139  |  104  |  13  ----------------------------<  113<H>  4.2   |  26  |  1.18  09-26    137  |  102  |  10  ----------------------------<  110<H>  4.0   |  27  |  1.28  09-25    139  |  104  |  9   ----------------------------<  120<H>  3.7   |  26  |  1.13    Ca    9.0      27 Sep 2023 07:18  Ca    9.2      26 Sep 2023 06:00  Ca    9.1      25 Sep 2023 06:21  Phos  3.7     09-27  Mg     2.60     09-27    TPro  6.1  /  Alb  3.3  /  TBili  <0.2  /  DBili  x   /  AST  17  /  ALT  26  /  AlkPhos  128<H>  09-27  TPro  6.1  /  Alb  3.2<L>  /  TBili  <0.2  /  DBili  x   /  AST  27  /  ALT  28  /  AlkPhos  134<H>  09-26  TPro  6.2  /  Alb  3.2<L>  /  TBili  <0.2  /  DBili  x   /  AST  14  /  ALT  21  /  AlkPhos  146<H>  09-25    Magnesium: 2.60 mg/dL (09-27-23 @ 07:18)  Magnesium: 2.40 mg/dL (09-26-23 @ 06:00)  Magnesium: 2.40 mg/dL (09-25-23 @ 06:21)    Phosphorus: 3.7 mg/dL (09-27-23 @ 07:18)  Phosphorus: 4.1 mg/dL (09-26-23 @ 06:00)  Phosphorus: 3.6 mg/dL (09-25-23 @ 06:21)                    Urinalysis Basic - ( 27 Sep 2023 07:18 )    Color: x / Appearance: x / SG: x / pH: x  Gluc: 113 mg/dL / Ketone: x  / Bili: x / Urobili: x   Blood: x / Protein: x / Nitrite: x   Leuk Esterase: x / RBC: x / WBC x   Sq Epi: x / Non Sq Epi: x / Bacteria: x                              13.3   16.45 )-----------( 366      ( 27 Sep 2023 07:18 )             40.3                         12.8   15.52 )-----------( 351      ( 26 Sep 2023 06:00 )             39.8                         12.8   16.11 )-----------( 341      ( 25 Sep 2023 06:21 )             40.0     CAPILLARY BLOOD GLUCOSE            MICROBIOLOGY:     RADIOLOGY:  [ ] Reviewed and interpreted by me    Point of Care Ultrasound Findings:    PFT:    EKG:

## 2023-09-27 NOTE — PROGRESS NOTE ADULT - ASSESSMENT
63M Slovenian-speaking former smoker, with hx of Non-Hodgkin's Lymphoma on Rituxan , Hepatitis B on entecavir (now?) , Campylobacter/EPEC gastroenteritis (2022), HLD, BPH who presents with 1 month of cough and constitutional symptom + Rhinovirus on rvp. 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    #recommendations  at time of eval patient tolerating RA with improvement of symptoms without steroids while on abx  at this time would not recommend bronchoscopy with BAL and tbbx to rule out opportunistic infection, organizing pna, easinophilic pneumonia, pneumonitis  also would hold off steroids at this time as peripheral easinophils not correlating with lung findings which also have an alternative explanation  continue abx as per ID  f/u send serum galactomannan and sputum O and P if possible  has normal resp willie in sputum culture  urine strep, urine legionella, nasal MRSA swab negative  follow up other serologic markers which are largely negative  titrate oxygen to O2 >88%, use humidified oxygen  would ambulate on room air to see if needs oxygen for home  incentive spirometry, OOB to chair, PT/OT  aspiration precautions as necessary  DVT ppx as tolerated  airway clearance as necessary  case discussed with family at bedside  will need follow up CT in 6 weeks    Prior to discharge:  Please email: [email or web address] to setup an appointment prior to discharge. Include the patient's name, , MRN and contact information in the email.      [facility]  [address] [place] [address]  [phone]

## 2023-09-27 NOTE — PROGRESS NOTE ADULT - SUBJECTIVE AND OBJECTIVE BOX
Follow Up:  pneumonia, eosinophilia    Interval History/ROS: pt afebrile, improved cough and SOB, no diarrhea, eos still [id number]        Allergies  No Known Allergies        ANTIMICROBIALS:  acyclovir   Oral Tab/Cap 400 two times a day  entecavir 0.5 daily  metroNIDAZOLE    Tablet 750 three times a day  piperacillin/tazobactam IVPB.. 3.375 every 8 hours      OTHER MEDS:  acetaminophen     Tablet .. 650 milliGRAM(s) Oral every 6 hours PRN  albuterol/ipratropium for Nebulization 3 milliLiter(s) Nebulizer every 6 hours PRN  benzonatate 100 milliGRAM(s) Oral every 8 hours PRN  enoxaparin Injectable 40 milliGRAM(s) SubCutaneous every 24 hours  melatonin 3 milliGRAM(s) Oral at bedtime PRN  oxybutynin XL 5 milliGRAM(s) Oral daily  tamsulosin 0.4 milliGRAM(s) Oral at bedtime      Vital Signs Last 24 Hrs  T(C): 36.7 (27 Sep 2023 10:34), Max: 37.2 (26 Sep 2023 18:30)  T(F): 98.1 (27 Sep 2023 10:34), Max: 98.9 (26 Sep 2023 18:30)  HR: 97 (27 Sep 2023 10:34) (75 - 97)  BP: 109/69 (27 Sep 2023 10:34) (102/66 - 109/69)  BP(mean): --  RR: 18 (27 Sep 2023 10:34) (18 - 18)  SpO2: 97% (27 Sep 2023 10:34) (96% - 98%)    Parameters below as of 27 Sep 2023 10:34  Patient On (Oxygen Delivery Method): nasal cannula        Physical Exam:  General:    NAD,  non toxic  Respiratory:    comfortable on RA  abd:     soft,   no tenderness  :   no CVAT,  no suprapubic tenderness,   no  miller  Musculoskeletal:   no joint swelling  vascular: no phlebitis  Skin:    no rash                          13.3   16.45 )-----------( 366      ( 27 Sep 2023 07:18 )             40.3       09-27    139  |  104  |  13  ----------------------------<  113<H>  4.2   |  26  |  1.18    Ca    9.0      27 Sep 2023 07:18  Phos  3.7     09-27  Mg     2.60     09-27    TPro  6.1  /  Alb  3.3  /  TBili  <0.2  /  DBili  x   /  AST  17  /  ALT  26  /  AlkPhos  128<H>  09-27      Urinalysis Basic - ( 27 Sep 2023 07:18 )    Color: x / Appearance: x / SG: x / pH: x  Gluc: 113 mg/dL / Ketone: x  / Bili: x / Urobili: x   Blood: x / Protein: x / Nitrite: x   Leuk Esterase: x / RBC: x / WBC x   Sq Epi: x / Non Sq Epi: x / Bacteria: x        MICROBIOLOGY:  v  .Sputum Sputum  09-21-23   Culture is being performed.  --  --      .Sputum Sputum  09-21-23   Culture is being performed.  --  --      .Sputum Sputum  09-20-23   Culture is being performed.  --  --      .Blood Blood-Peripheral  09-20-23   No growth at 5 days  --  --      .Blood Blood-Peripheral  09-20-23   No growth at 5 days  --  --      .Sputum Sputum  09-20-23   Normal Respiratory Ritu present  --    Few polymorphonuclear leukocytes per low power field  Moderate Squamous epithelial cells per low power field  Moderate Gram Positive Cocci in Pairs and Chains per oil power field  Few Gram Positive Rods per oil power field  Few Gram Negative Rods per oil power field                RADIOLOGY:  Images independently visualized and reviewed personally, findings as below  < from: CT Abdomen and Pelvis w/ IV Cont (09.22.23 @ 19:14) >  IMPRESSION:  Right lower lobe pneumonia and left basilar opacity which could represent   atelectasis versus pneumonia.    Retroperitoneal and mesenteric soft tissue masses, without significant   change overall since prior study, in keeping with history of lymphoma.    Questionable rectal and descending colon wall thickening. Correlate for   symptoms of proctocolitis.      < end of copied text >

## 2023-09-27 NOTE — PHYSICAL THERAPY INITIAL EVALUATION ADULT - ADDITIONAL COMMENTS
Patient lives with family in private home. Patient was independent prior to admission. Patient was not using an assistive device prior to admission.

## 2023-09-27 NOTE — PHYSICAL THERAPY INITIAL EVALUATION ADULT - PERTINENT HX OF CURRENT PROBLEM, REHAB EVAL
63 year old male presents with 1 month of cough and constitutional symptoms. Symptoms began before going to [facility] improved there with abdominal, but did not completely resolve. Now complaining of productive cough, chest pain when coughing, nausea, vomiting, loose stool, anorexia, weakness, insomnia for cough.

## 2023-09-27 NOTE — PROGRESS NOTE ADULT - SUBJECTIVE AND OBJECTIVE BOX
INTERVAL HPI/OVERNIGHT EVENTS:  Patient S&E at bedside. No o/n events,     VITAL SIGNS:  T(F): 98.3 (09-27-23 @ 06:50)  HR: 82 (09-27-23 @ 06:50)  BP: 104/71 (09-27-23 @ 06:50)  RR: 18 (09-27-23 @ 06:50)  SpO2: 97% (09-27-23 @ 06:50)  Wt(kg): --    PHYSICAL EXAM:    Constitutional: NAD  Eyes: EOMI, sclera non-icteric  Neck: supple  Respiratory: CTAB, no wheezes or crackles   Cardiovascular: RRR  Gastrointestinal: soft, NTND, + BS  Extremities: no cyanosis, clubbing or edema   Neurological: awake and alert      MEDICATIONS  (STANDING):  acyclovir   Oral Tab/Cap 400 milliGRAM(s) Oral two times a day  enoxaparin Injectable 40 milliGRAM(s) SubCutaneous every 24 hours  entecavir 0.5 milliGRAM(s) Oral daily  metroNIDAZOLE    Tablet 750 milliGRAM(s) Oral three times a day  oxybutynin XL 5 milliGRAM(s) Oral daily  piperacillin/tazobactam IVPB.. 3.375 Gram(s) IV Intermittent every 8 hours  tamsulosin 0.4 milliGRAM(s) Oral at bedtime    MEDICATIONS  (PRN):  acetaminophen     Tablet .. 650 milliGRAM(s) Oral every 6 hours PRN Temp greater or equal to 38C (100.4F), Mild Pain (1 - 3)  albuterol/ipratropium for Nebulization 3 milliLiter(s) Nebulizer every 6 hours PRN Bronchospasm  benzonatate 100 milliGRAM(s) Oral every 8 hours PRN Cough  melatonin 3 milliGRAM(s) Oral at bedtime PRN Insomnia      Allergies    No Known Allergies    Intolerances        LABS:                        13.3   16.45 )-----------( 366      ( 27 Sep 2023 07:18 )             40.3     09-27    139  |  104  |  13  ----------------------------<  113<H>  4.2   |  26  |  1.18    Ca    9.0      27 Sep 2023 07:18  Phos  3.7     09-27  Mg     2.60     09-27    TPro  6.1  /  Alb  3.3  /  TBili  <0.2  /  DBili  x   /  AST  17  /  ALT  26  /  AlkPhos  128<H>  09-27      Urinalysis Basic - ( 27 Sep 2023 07:18 )    Color: x / Appearance: x / SG: x / pH: x  Gluc: 113 mg/dL / Ketone: x  / Bili: x / Urobili: x   Blood: x / Protein: x / Nitrite: x   Leuk Esterase: x / RBC: x / WBC x   Sq Epi: x / Non Sq Epi: x / Bacteria: x        RADIOLOGY & ADDITIONAL TESTS:  Studies reviewed.   INTERVAL HPI/OVERNIGHT EVENTS:  Patient S&E at bedside. No o/n events. Patient is still on NC.    VITAL SIGNS:  T(F): 98.3 (09-27-23 @ 06:50)  HR: 82 (09-27-23 @ 06:50)  BP: 104/71 (09-27-23 @ 06:50)  RR: 18 (09-27-23 @ 06:50)  SpO2: 97% (09-27-23 @ 06:50)  Wt(kg): --    PHYSICAL EXAM:    Constitutional: NAD  Eyes: EOMI, sclera non-icteric  Neck: supple  Respiratory: CTAB, no wheezes. Mild crackles in lung bases.  Cardiovascular: RRR  Gastrointestinal: soft, NTND, + BS  Extremities: no cyanosis, clubbing or edema   Neurological: awake and alert      MEDICATIONS  (STANDING):  acyclovir   Oral Tab/Cap 400 milliGRAM(s) Oral two times a day  enoxaparin Injectable 40 milliGRAM(s) SubCutaneous every 24 hours  entecavir 0.5 milliGRAM(s) Oral daily  metroNIDAZOLE    Tablet 750 milliGRAM(s) Oral three times a day  oxybutynin XL 5 milliGRAM(s) Oral daily  piperacillin/tazobactam IVPB.. 3.375 Gram(s) IV Intermittent every 8 hours  tamsulosin 0.4 milliGRAM(s) Oral at bedtime    MEDICATIONS  (PRN):  acetaminophen     Tablet .. 650 milliGRAM(s) Oral every 6 hours PRN Temp greater or equal to 38C (100.4F), Mild Pain (1 - 3)  albuterol/ipratropium for Nebulization 3 milliLiter(s) Nebulizer every 6 hours PRN Bronchospasm  benzonatate 100 milliGRAM(s) Oral every 8 hours PRN Cough  melatonin 3 milliGRAM(s) Oral at bedtime PRN Insomnia      Allergies    No Known Allergies    Intolerances        LABS:                        13.3   16.45 )-----------( 366      ( 27 Sep 2023 07:18 )             40.3     09-27    139  |  104  |  13  ----------------------------<  113<H>  4.2   |  26  |  1.18    Ca    9.0      27 Sep 2023 07:18  Phos  3.7     09-27  Mg     2.60     09-27    TPro  6.1  /  Alb  3.3  /  TBili  <0.2  /  DBili  x   /  AST  17  /  ALT  26  /  AlkPhos  128<H>  09-27      Urinalysis Basic - ( 27 Sep 2023 07:18 )    Color: x / Appearance: x / SG: x / pH: x  Gluc: 113 mg/dL / Ketone: x  / Bili: x / Urobili: x   Blood: x / Protein: x / Nitrite: x   Leuk Esterase: x / RBC: x / WBC x   Sq Epi: x / Non Sq Epi: x / Bacteria: x        RADIOLOGY & ADDITIONAL TESTS:  Studies reviewed.

## 2023-09-28 LAB
ANION GAP SERPL CALC-SCNC: 12 MMOL/L — SIGNIFICANT CHANGE UP (ref 7–14)
BASOPHILS # BLD AUTO: 0.1 K/UL — SIGNIFICANT CHANGE UP (ref 0–0.2)
BASOPHILS NFR BLD AUTO: 0.6 % — SIGNIFICANT CHANGE UP (ref 0–2)
BUN SERPL-MCNC: 14 MG/DL — SIGNIFICANT CHANGE UP (ref 7–23)
CALCIUM SERPL-MCNC: 8.8 MG/DL — SIGNIFICANT CHANGE UP (ref 8.4–10.5)
CHLORIDE SERPL-SCNC: 101 MMOL/L — SIGNIFICANT CHANGE UP (ref 98–107)
CO2 SERPL-SCNC: 27 MMOL/L — SIGNIFICANT CHANGE UP (ref 22–31)
CREAT SERPL-MCNC: 1.09 MG/DL — SIGNIFICANT CHANGE UP (ref 0.5–1.3)
EGFR: 75 ML/MIN/1.73M2 — SIGNIFICANT CHANGE UP
EOSINOPHIL # BLD AUTO: 11.02 K/UL — HIGH (ref 0–0.5)
EOSINOPHIL NFR BLD AUTO: 67.6 % — HIGH (ref 0–6)
GLUCOSE SERPL-MCNC: 110 MG/DL — HIGH (ref 70–99)
HCT VFR BLD CALC: 41.4 % — SIGNIFICANT CHANGE UP (ref 39–50)
HGB BLD-MCNC: 13.3 G/DL — SIGNIFICANT CHANGE UP (ref 13–17)
IANC: 2.83 K/UL — SIGNIFICANT CHANGE UP (ref 1.8–7.4)
IMM GRANULOCYTES NFR BLD AUTO: 1.6 % — HIGH (ref 0–0.9)
LYMPHOCYTES # BLD AUTO: 1.23 K/UL — SIGNIFICANT CHANGE UP (ref 1–3.3)
LYMPHOCYTES # BLD AUTO: 7.5 % — LOW (ref 13–44)
MAGNESIUM SERPL-MCNC: 2.7 MG/DL — HIGH (ref 1.6–2.6)
MCHC RBC-ENTMCNC: 30.2 PG — SIGNIFICANT CHANGE UP (ref 27–34)
MCHC RBC-ENTMCNC: 32.1 GM/DL — SIGNIFICANT CHANGE UP (ref 32–36)
MCV RBC AUTO: 94.1 FL — SIGNIFICANT CHANGE UP (ref 80–100)
MONOCYTES # BLD AUTO: 0.87 K/UL — SIGNIFICANT CHANGE UP (ref 0–0.9)
MONOCYTES NFR BLD AUTO: 5.3 % — SIGNIFICANT CHANGE UP (ref 2–14)
NEUTROPHILS # BLD AUTO: 2.83 K/UL — SIGNIFICANT CHANGE UP (ref 1.8–7.4)
NEUTROPHILS NFR BLD AUTO: 17.4 % — LOW (ref 43–77)
NRBC # BLD: 0 /100 WBCS — SIGNIFICANT CHANGE UP (ref 0–0)
NRBC # FLD: 0 K/UL — SIGNIFICANT CHANGE UP (ref 0–0)
PHOSPHATE SERPL-MCNC: 3.3 MG/DL — SIGNIFICANT CHANGE UP (ref 2.5–4.5)
PLATELET # BLD AUTO: 381 K/UL — SIGNIFICANT CHANGE UP (ref 150–400)
POTASSIUM SERPL-MCNC: 4.2 MMOL/L — SIGNIFICANT CHANGE UP (ref 3.5–5.3)
POTASSIUM SERPL-SCNC: 4.2 MMOL/L — SIGNIFICANT CHANGE UP (ref 3.5–5.3)
RBC # BLD: 4.4 M/UL — SIGNIFICANT CHANGE UP (ref 4.2–5.8)
RBC # FLD: 13.5 % — SIGNIFICANT CHANGE UP (ref 10.3–14.5)
SODIUM SERPL-SCNC: 140 MMOL/L — SIGNIFICANT CHANGE UP (ref 135–145)
WBC # BLD: 16.22 K/UL — HIGH (ref 3.8–10.5)
WBC # FLD AUTO: 16.22 K/UL — HIGH (ref 3.8–10.5)

## 2023-09-28 PROCEDURE — G0452: CPT | Mod: 26

## 2023-09-28 PROCEDURE — 99233 SBSQ HOSP IP/OBS HIGH 50: CPT

## 2023-09-28 PROCEDURE — G0452: CPT | Mod: 26,59

## 2023-09-28 RX ADMIN — ENTECAVIR 0.5 MILLIGRAM(S): 0.5 TABLET ORAL at 14:12

## 2023-09-28 RX ADMIN — ENOXAPARIN SODIUM 40 MILLIGRAM(S): 100 INJECTION SUBCUTANEOUS at 22:57

## 2023-09-28 RX ADMIN — Medication 5 MILLIGRAM(S): at 05:49

## 2023-09-28 RX ADMIN — Medication 100 MILLIGRAM(S): at 23:18

## 2023-09-28 RX ADMIN — TAMSULOSIN HYDROCHLORIDE 0.4 MILLIGRAM(S): 0.4 CAPSULE ORAL at 22:57

## 2023-09-28 RX ADMIN — Medication 100 MILLIGRAM(S): at 14:14

## 2023-09-28 RX ADMIN — Medication 750 MILLIGRAM(S): at 22:57

## 2023-09-28 RX ADMIN — Medication 400 MILLIGRAM(S): at 05:49

## 2023-09-28 RX ADMIN — Medication 750 MILLIGRAM(S): at 05:49

## 2023-09-28 RX ADMIN — Medication 750 MILLIGRAM(S): at 14:11

## 2023-09-28 RX ADMIN — Medication 400 MILLIGRAM(S): at 18:12

## 2023-09-28 NOTE — PROGRESS NOTE ADULT - TIME BILLING
Time spent for extensive review of the physical chart, electronic medical record, and documentation to obtain collateral information including but not limited to:  [x ] Inpatient records (ED, H&P, primary team, and consultants if applicable, care coordination)  [x ] Inpatient values/results (biomarkers, immunoassays, imaging, and microbiology results)  [x ] Current or proposed treatment plans  [ x] Discussion with the primary team  [ x] Discussion with the patient, surrogate decision maker, or family    Time spent: >36
review of laboratory data, radiology results, discussion with patient/family, discussing care with interdisciplinary staff, and documenting in [place]. Additional interventions were performed as documented above.

## 2023-09-28 NOTE — PROGRESS NOTE ADULT - NSPROGADDITIONALINFOA_GEN_ALL_CORE
Communication: Updated daughter today 9/28  for 11 min regarding plan of care. All questions answered.

## 2023-09-28 NOTE — PROGRESS NOTE ADULT - SUBJECTIVE AND OBJECTIVE BOX
Patient is a 63y old  Male who presents with a chief complaint of resp infection (27 Sep 2023 14:19)    Sinan Cormier MD   [facility]   Pager [phone]  Reachable on Microsoft Teams     SUBJECTIVE / OVERNIGHT EVENTS:  Patient seen and examined this morning. Continues to have productive cough.    MEDICATIONS  (STANDING):  acyclovir   Oral Tab/Cap 400 milliGRAM(s) Oral two times a day  enoxaparin Injectable 40 milliGRAM(s) SubCutaneous every 24 hours  entecavir 0.5 milliGRAM(s) Oral daily  metroNIDAZOLE    Tablet 750 milliGRAM(s) Oral three times a day  oxybutynin XL 5 milliGRAM(s) Oral daily  tamsulosin 0.4 milliGRAM(s) Oral at bedtime    MEDICATIONS  (PRN):  acetaminophen     Tablet .. 650 milliGRAM(s) Oral every 6 hours PRN Temp greater or equal to 38C (100.4F), Mild Pain (1 - 3)  albuterol/ipratropium for Nebulization 3 milliLiter(s) Nebulizer every 6 hours PRN Bronchospasm  benzonatate 100 milliGRAM(s) Oral every 8 hours PRN Cough  melatonin 3 milliGRAM(s) Oral at bedtime PRN Insomnia      Vital Signs Last 24 Hrs  T(C): 36.8 (28 Sep 2023 16:53), Max: 36.8 (27 Sep 2023 17:54)  T(F): 98.3 (28 Sep 2023 16:53), Max: 98.3 (27 Sep 2023 17:54)  HR: 87 (28 Sep 2023 16:53) (85 - 92)  BP: 105/62 (28 Sep 2023 16:53) (105/62 - 119/67)  BP(mean): --  RR: 18 (28 Sep 2023 16:53) (18 - 19)  SpO2: 95% (28 Sep 2023 16:53) (95% - 98%)  CAPILLARY BLOOD GLUCOSE        I&O's Summary    27 Sep 2023 07:01  -  28 Sep 2023 07:00  --------------------------------------------------------  IN: 720 mL / OUT: 0 mL / NET: 720 mL    28 Sep 2023 07:01  -  28 Sep 2023 17:36  --------------------------------------------------------  IN: 720 mL / OUT: 0 mL / NET: 720 mL      General: man laying down in bed appears comfortable in NAD, awake and alert  HENMT: MMM   Respiratory: No respiratory distress, decreased breath sounds in the bases, No wheezing.  Cardiovascular: S1,S2; No m/g/r.  Gastrointestinal: Soft, Nontender, Nondistended; +BS.   Extremities: No c/c/e; warm to touch  Skin: No rashes, No erythema   Psych: appropriate mood and affect    LABS:                        13.3   16.22 )-----------( 381      ( 28 Sep 2023 07:18 )             41.4     09-28    140  |  101  |  14  ----------------------------<  110<H>  4.2   |  27  |  1.09    Ca    8.8      28 Sep 2023 07:18  Phos  3.3     09-28  Mg     2.70     09-28    TPro  6.1  /  Alb  3.3  /  TBili  <0.2  /  DBili  x   /  AST  17  /  ALT  26  /  AlkPhos  128<H>  09-27          Urinalysis Basic - ( 28 Sep 2023 07:18 )    Color: x / Appearance: x / SG: x / pH: x  Gluc: 110 mg/dL / Ketone: x  / Bili: x / Urobili: x   Blood: x / Protein: x / Nitrite: x   Leuk Esterase: x / RBC: x / WBC x   Sq Epi: x / Non Sq Epi: x / Bacteria: x        RADIOLOGY & ADDITIONAL TESTS:    Imaging Personally Reviewed:    Consultant(s) Notes Reviewed:      Care Discussed with Consultants/Other Providers:

## 2023-09-29 LAB
BASOPHILS # BLD AUTO: 0.09 K/UL — SIGNIFICANT CHANGE UP (ref 0–0.2)
BASOPHILS NFR BLD AUTO: 0.6 % — SIGNIFICANT CHANGE UP (ref 0–2)
BCR/ABL BY RT - PCR QUANTITATIVE: SIGNIFICANT CHANGE UP
CD117 AG [PRESENCE] IN SPECIMEN BY IMMUNE STAIN: NEGATIVE — SIGNIFICANT CHANGE UP
EOSINOPHIL # BLD AUTO: 10.55 K/UL — HIGH (ref 0–0.5)
EOSINOPHIL NFR BLD AUTO: 65.7 % — HIGH (ref 0–6)
HCT VFR BLD CALC: 42.3 % — SIGNIFICANT CHANGE UP (ref 39–50)
HGB BLD-MCNC: 13.6 G/DL — SIGNIFICANT CHANGE UP (ref 13–17)
IANC: 2.88 K/UL — SIGNIFICANT CHANGE UP (ref 1.8–7.4)
IGA FLD-MCNC: 87 MG/DL — SIGNIFICANT CHANGE UP (ref 70–400)
IGG FLD-MCNC: 619 MG/DL — LOW (ref 700–1600)
IGM SERPL-MCNC: <5 MG/DL — LOW (ref 40–230)
IMM GRANULOCYTES NFR BLD AUTO: 2.1 % — HIGH (ref 0–0.9)
LYMPHOCYTES # BLD AUTO: 1.25 K/UL — SIGNIFICANT CHANGE UP (ref 1–3.3)
LYMPHOCYTES # BLD AUTO: 7.8 % — LOW (ref 13–44)
MCHC RBC-ENTMCNC: 29.9 PG — SIGNIFICANT CHANGE UP (ref 27–34)
MCHC RBC-ENTMCNC: 32.2 GM/DL — SIGNIFICANT CHANGE UP (ref 32–36)
MCV RBC AUTO: 93 FL — SIGNIFICANT CHANGE UP (ref 80–100)
MONOCYTES # BLD AUTO: 0.96 K/UL — HIGH (ref 0–0.9)
MONOCYTES NFR BLD AUTO: 6 % — SIGNIFICANT CHANGE UP (ref 2–14)
NEUTROPHILS # BLD AUTO: 2.88 K/UL — SIGNIFICANT CHANGE UP (ref 1.8–7.4)
NEUTROPHILS NFR BLD AUTO: 17.8 % — LOW (ref 43–77)
NRBC # BLD: 0 /100 WBCS — SIGNIFICANT CHANGE UP (ref 0–0)
NRBC # FLD: 0 K/UL — SIGNIFICANT CHANGE UP (ref 0–0)
PLATELET # BLD AUTO: 373 K/UL — SIGNIFICANT CHANGE UP (ref 150–400)
RBC # BLD: 4.55 M/UL — SIGNIFICANT CHANGE UP (ref 4.2–5.8)
RBC # FLD: 13.6 % — SIGNIFICANT CHANGE UP (ref 10.3–14.5)
WBC # BLD: 16.07 K/UL — HIGH (ref 3.8–10.5)
WBC # FLD AUTO: 16.07 K/UL — HIGH (ref 3.8–10.5)

## 2023-09-29 PROCEDURE — 88313 SPECIAL STAINS GROUP 2: CPT | Mod: 26

## 2023-09-29 PROCEDURE — 85097 BONE MARROW INTERPRETATION: CPT

## 2023-09-29 PROCEDURE — 88305 TISSUE EXAM BY PATHOLOGIST: CPT | Mod: 26

## 2023-09-29 PROCEDURE — G0452: CPT | Mod: 26,59

## 2023-09-29 PROCEDURE — G0452: CPT | Mod: 26

## 2023-09-29 PROCEDURE — 88341 IMHCHEM/IMCYTCHM EA ADD ANTB: CPT | Mod: 26

## 2023-09-29 PROCEDURE — 99232 SBSQ HOSP IP/OBS MODERATE 35: CPT

## 2023-09-29 PROCEDURE — 93306 TTE W/DOPPLER COMPLETE: CPT | Mod: 26

## 2023-09-29 PROCEDURE — 88342 IMHCHEM/IMCYTCHM 1ST ANTB: CPT | Mod: 26

## 2023-09-29 PROCEDURE — 88187 FLOWCYTOMETRY/READ 2-8: CPT | Mod: 59

## 2023-09-29 PROCEDURE — 38221 DX BONE MARROW BIOPSIES: CPT | Mod: GC

## 2023-09-29 RX ADMIN — TAMSULOSIN HYDROCHLORIDE 0.4 MILLIGRAM(S): 0.4 CAPSULE ORAL at 23:23

## 2023-09-29 RX ADMIN — Medication 3 MILLIGRAM(S): at 23:24

## 2023-09-29 RX ADMIN — Medication 750 MILLIGRAM(S): at 14:08

## 2023-09-29 RX ADMIN — ENOXAPARIN SODIUM 40 MILLIGRAM(S): 100 INJECTION SUBCUTANEOUS at 23:24

## 2023-09-29 RX ADMIN — Medication 750 MILLIGRAM(S): at 23:27

## 2023-09-29 RX ADMIN — ENTECAVIR 0.5 MILLIGRAM(S): 0.5 TABLET ORAL at 14:09

## 2023-09-29 RX ADMIN — Medication 400 MILLIGRAM(S): at 06:30

## 2023-09-29 RX ADMIN — Medication 400 MILLIGRAM(S): at 17:24

## 2023-09-29 RX ADMIN — Medication 5 MILLIGRAM(S): at 14:09

## 2023-09-29 RX ADMIN — Medication 750 MILLIGRAM(S): at 06:29

## 2023-09-29 NOTE — PROCEDURE NOTE - SUPERVISORY STATEMENT
Attending note:  I was present for the entire procedure.  No immediate complications are noted.  The obtained samples were transported to the laboratory by the fellow, Dr. Valera.  Arvind Cameron MD

## 2023-09-29 NOTE — PROGRESS NOTE ADULT - SUBJECTIVE AND OBJECTIVE BOX
Patient is a 63y old  Male who presents with a chief complaint of resp infection (28 Sep 2023 17:26)    Sinan Cormier MD   [facility] Division of Hospital Medicine   Pager [phone]  Reachable on Microsoft Teams     SUBJECTIVE / OVERNIGHT EVENTS:  Patient seen and examined this morning. No events overnight. States that he is feeling tired today   Continues to have productive cough. No chest pain or dyspnea at rest.   No fevers, chills.     MEDICATIONS  (STANDING):  acyclovir   Oral Tab/Cap 400 milliGRAM(s) Oral two times a day  enoxaparin Injectable 40 milliGRAM(s) SubCutaneous every 24 hours  entecavir 0.5 milliGRAM(s) Oral daily  metroNIDAZOLE    Tablet 750 milliGRAM(s) Oral three times a day  oxybutynin XL 5 milliGRAM(s) Oral daily  tamsulosin 0.4 milliGRAM(s) Oral at bedtime    MEDICATIONS  (PRN):  acetaminophen     Tablet .. 650 milliGRAM(s) Oral every 6 hours PRN Temp greater or equal to 38C (100.4F), Mild Pain (1 - 3)  albuterol/ipratropium for Nebulization 3 milliLiter(s) Nebulizer every 6 hours PRN Bronchospasm  benzonatate 100 milliGRAM(s) Oral every 8 hours PRN Cough  melatonin 3 milliGRAM(s) Oral at bedtime PRN Insomnia      Vital Signs Last 24 Hrs  T(C): 36.8 (29 Sep 2023 09:34), Max: 37.2 (28 Sep 2023 20:51)  T(F): 98.3 (29 Sep 2023 09:34), Max: 99 (28 Sep 2023 20:51)  HR: 80 (29 Sep 2023 09:34) (80 - 87)  BP: 122/67 (29 Sep 2023 09:34) (101/66 - 122/68)  BP(mean): --  RR: 16 (29 Sep 2023 09:34) (16 - 19)  SpO2: 97% (29 Sep 2023 09:34) (95% - 97%)  CAPILLARY BLOOD GLUCOSE        I&O's Summary    28 Sep 2023 07:01  -  29 Sep 2023 07:00  --------------------------------------------------------  IN: 720 mL / OUT: 0 mL / NET: 720 mL        General: man laying down in bed appears comfortable in NAD, awake and alert  HENMT: MMM   Respiratory: No respiratory distress, decreased breath sounds in the bases, No wheezing.  Cardiovascular: S1,S2; No m/g/r.  Gastrointestinal: Soft, Nontender, Nondistended; +BS.   Extremities: No c/c/e; warm to touch  Skin: No rashes, No erythema   Psych: appropriate mood and affect    LABS:                        13.6   16.07 )-----------( 373      ( 29 Sep 2023 05:35 )             42.3     09-28    140  |  101  |  14  ----------------------------<  110<H>  4.2   |  27  |  1.09    Ca    8.8      28 Sep 2023 07:18  Phos  3.3     09-28  Mg     2.70     09-28            Urinalysis Basic - ( 28 Sep 2023 07:18 )    Color: x / Appearance: x / SG: x / pH: x  Gluc: 110 mg/dL / Ketone: x  / Bili: x / Urobili: x   Blood: x / Protein: x / Nitrite: x   Leuk Esterase: x / RBC: x / WBC x   Sq Epi: x / Non Sq Epi: x / Bacteria: x        RADIOLOGY & ADDITIONAL TESTS:    Imaging Personally Reviewed:    Consultant(s) Notes Reviewed:      Care Discussed with Consultants/Other Providers:

## 2023-09-30 LAB
ANION GAP SERPL CALC-SCNC: 8 MMOL/L — SIGNIFICANT CHANGE UP (ref 7–14)
BASOPHILS # BLD AUTO: 0.1 K/UL — SIGNIFICANT CHANGE UP (ref 0–0.2)
BASOPHILS NFR BLD AUTO: 0.6 % — SIGNIFICANT CHANGE UP (ref 0–2)
BUN SERPL-MCNC: 15 MG/DL — SIGNIFICANT CHANGE UP (ref 7–23)
CALCIUM SERPL-MCNC: 8.8 MG/DL — SIGNIFICANT CHANGE UP (ref 8.4–10.5)
CHLORIDE SERPL-SCNC: 104 MMOL/L — SIGNIFICANT CHANGE UP (ref 98–107)
CO2 SERPL-SCNC: 25 MMOL/L — SIGNIFICANT CHANGE UP (ref 22–31)
CREAT SERPL-MCNC: 1 MG/DL — SIGNIFICANT CHANGE UP (ref 0.5–1.3)
CULTURE RESULTS: SIGNIFICANT CHANGE UP
EGFR: 84 ML/MIN/1.73M2 — SIGNIFICANT CHANGE UP
EOSINOPHIL # BLD AUTO: 9.64 K/UL — HIGH (ref 0–0.5)
EOSINOPHIL NFR BLD AUTO: 62.2 % — HIGH (ref 0–6)
GLUCOSE SERPL-MCNC: 114 MG/DL — HIGH (ref 70–99)
HCT VFR BLD CALC: 42.4 % — SIGNIFICANT CHANGE UP (ref 39–50)
HGB BLD-MCNC: 13.9 G/DL — SIGNIFICANT CHANGE UP (ref 13–17)
IANC: 3.19 K/UL — SIGNIFICANT CHANGE UP (ref 1.8–7.4)
IMM GRANULOCYTES NFR BLD AUTO: 1.4 % — HIGH (ref 0–0.9)
LYMPHOCYTES # BLD AUTO: 1.24 K/UL — SIGNIFICANT CHANGE UP (ref 1–3.3)
LYMPHOCYTES # BLD AUTO: 8 % — LOW (ref 13–44)
MAGNESIUM SERPL-MCNC: 2.5 MG/DL — SIGNIFICANT CHANGE UP (ref 1.6–2.6)
MCHC RBC-ENTMCNC: 30.1 PG — SIGNIFICANT CHANGE UP (ref 27–34)
MCHC RBC-ENTMCNC: 32.8 GM/DL — SIGNIFICANT CHANGE UP (ref 32–36)
MCV RBC AUTO: 91.8 FL — SIGNIFICANT CHANGE UP (ref 80–100)
MONOCYTES # BLD AUTO: 1.11 K/UL — HIGH (ref 0–0.9)
MONOCYTES NFR BLD AUTO: 7.2 % — SIGNIFICANT CHANGE UP (ref 2–14)
NEUTROPHILS # BLD AUTO: 3.19 K/UL — SIGNIFICANT CHANGE UP (ref 1.8–7.4)
NEUTROPHILS NFR BLD AUTO: 20.6 % — LOW (ref 43–77)
NRBC # BLD: 0 /100 WBCS — SIGNIFICANT CHANGE UP (ref 0–0)
NRBC # FLD: 0 K/UL — SIGNIFICANT CHANGE UP (ref 0–0)
PHOSPHATE SERPL-MCNC: 3.8 MG/DL — SIGNIFICANT CHANGE UP (ref 2.5–4.5)
PLATELET # BLD AUTO: 357 K/UL — SIGNIFICANT CHANGE UP (ref 150–400)
POTASSIUM SERPL-MCNC: 4.1 MMOL/L — SIGNIFICANT CHANGE UP (ref 3.5–5.3)
POTASSIUM SERPL-SCNC: 4.1 MMOL/L — SIGNIFICANT CHANGE UP (ref 3.5–5.3)
RBC # BLD: 4.62 M/UL — SIGNIFICANT CHANGE UP (ref 4.2–5.8)
RBC # FLD: 13.7 % — SIGNIFICANT CHANGE UP (ref 10.3–14.5)
SODIUM SERPL-SCNC: 137 MMOL/L — SIGNIFICANT CHANGE UP (ref 135–145)
SPECIMEN SOURCE: SIGNIFICANT CHANGE UP
WBC # BLD: 15.49 K/UL — HIGH (ref 3.8–10.5)
WBC # FLD AUTO: 15.49 K/UL — HIGH (ref 3.8–10.5)

## 2023-09-30 PROCEDURE — 99233 SBSQ HOSP IP/OBS HIGH 50: CPT | Mod: FS

## 2023-09-30 PROCEDURE — 93010 ELECTROCARDIOGRAM REPORT: CPT

## 2023-09-30 RX ADMIN — Medication 750 MILLIGRAM(S): at 14:10

## 2023-09-30 RX ADMIN — Medication 750 MILLIGRAM(S): at 21:05

## 2023-09-30 RX ADMIN — ENTECAVIR 0.5 MILLIGRAM(S): 0.5 TABLET ORAL at 14:10

## 2023-09-30 RX ADMIN — Medication 400 MILLIGRAM(S): at 17:19

## 2023-09-30 RX ADMIN — Medication 5 MILLIGRAM(S): at 14:10

## 2023-09-30 RX ADMIN — ENOXAPARIN SODIUM 40 MILLIGRAM(S): 100 INJECTION SUBCUTANEOUS at 21:04

## 2023-09-30 RX ADMIN — Medication 400 MILLIGRAM(S): at 06:51

## 2023-09-30 RX ADMIN — TAMSULOSIN HYDROCHLORIDE 0.4 MILLIGRAM(S): 0.4 CAPSULE ORAL at 21:04

## 2023-09-30 RX ADMIN — Medication 750 MILLIGRAM(S): at 06:51

## 2023-09-30 NOTE — PROGRESS NOTE ADULT - SUBJECTIVE AND OBJECTIVE BOX
Patient is a 63y old  Male who presents with a chief complaint of resp infection (29 Sep 2023 12:39)      SUBJECTIVE / OVERNIGHT EVENTS:    No acute events overnight. Pt is seen and examined at bedside.       MEDICATIONS  (STANDING):  acyclovir   Oral Tab/Cap 400 milliGRAM(s) Oral two times a day  enoxaparin Injectable 40 milliGRAM(s) SubCutaneous every 24 hours  entecavir 0.5 milliGRAM(s) Oral daily  metroNIDAZOLE    Tablet 750 milliGRAM(s) Oral three times a day  oxybutynin XL 5 milliGRAM(s) Oral daily  tamsulosin 0.4 milliGRAM(s) Oral at bedtime    MEDICATIONS  (PRN):  acetaminophen     Tablet .. 650 milliGRAM(s) Oral every 6 hours PRN Temp greater or equal to 38C (100.4F), Mild Pain (1 - 3)  albuterol/ipratropium for Nebulization 3 milliLiter(s) Nebulizer every 6 hours PRN Bronchospasm  benzonatate 100 milliGRAM(s) Oral every 8 hours PRN Cough  melatonin 3 milliGRAM(s) Oral at bedtime PRN Insomnia      Vital Signs Last 24 Hrs  T(C): 36.7 (30 Sep 2023 05:00), Max: 36.8 (29 Sep 2023 21:34)  T(F): 98 (30 Sep 2023 05:00), Max: 98.2 (29 Sep 2023 21:34)  HR: 84 (30 Sep 2023 05:00) (83 - 90)  BP: 110/74 (30 Sep 2023 05:00) (110/74 - 117/69)  BP(mean): --  RR: 17 (30 Sep 2023 05:00) (16 - 17)  SpO2: 95% (30 Sep 2023 05:00) (95% - 97%)    Parameters below as of 30 Sep 2023 05:00  Patient On (Oxygen Delivery Method): nasal cannula          PHYSICAL EXAM  GENERAL: NAD, well-developed  HEAD:  Atraumatic, Normocephalic  EYES: EOMI, PERRLA, conjunctiva and sclera clear  NECK: Supple, No JVD  CHEST/LUNG: Clear to auscultation bilaterally; No wheeze  HEART: Regular rate and rhythm; No murmurs, rubs, or gallops  ABDOMEN: Soft, Nontender, Nondistended; Bowel sounds present  EXTREMITIES:  2+ Peripheral Pulses, No clubbing, cyanosis, or edema  PSYCH: AAOx3  SKIN: No rashes or lesions    CAPILLARY BLOOD GLUCOSE        I&O's Summary      LABS:                        13.9   15.49 )-----------( 357      ( 30 Sep 2023 06:24 )             42.4     09-30    137  |  104  |  15  ----------------------------<  114<H>  4.1   |  25  |  1.00    Ca    8.8      30 Sep 2023 06:24  Phos  3.8     09-30  Mg     2.50     09-30            Urinalysis Basic - ( 30 Sep 2023 06:24 )    Color: x / Appearance: x / SG: x / pH: x  Gluc: 114 mg/dL / Ketone: x  / Bili: x / Urobili: x   Blood: x / Protein: x / Nitrite: x   Leuk Esterase: x / RBC: x / WBC x   Sq Epi: x / Non Sq Epi: x / Bacteria: x

## 2023-10-01 LAB
ANION GAP SERPL CALC-SCNC: 14 MMOL/L — SIGNIFICANT CHANGE UP (ref 7–14)
BASOPHILS # BLD AUTO: 0 K/UL — SIGNIFICANT CHANGE UP (ref 0–0.2)
BASOPHILS NFR BLD AUTO: 0 % — SIGNIFICANT CHANGE UP (ref 0–2)
BUN SERPL-MCNC: 14 MG/DL — SIGNIFICANT CHANGE UP (ref 7–23)
CALCIUM SERPL-MCNC: 8.9 MG/DL — SIGNIFICANT CHANGE UP (ref 8.4–10.5)
CHLORIDE SERPL-SCNC: 99 MMOL/L — SIGNIFICANT CHANGE UP (ref 98–107)
CO2 SERPL-SCNC: 25 MMOL/L — SIGNIFICANT CHANGE UP (ref 22–31)
CREAT SERPL-MCNC: 1.06 MG/DL — SIGNIFICANT CHANGE UP (ref 0.5–1.3)
EGFR: 78 ML/MIN/1.73M2 — SIGNIFICANT CHANGE UP
EOSINOPHIL # BLD AUTO: 7.18 K/UL — HIGH (ref 0–0.5)
EOSINOPHIL NFR BLD AUTO: 52.5 % — HIGH (ref 0–6)
GLUCOSE SERPL-MCNC: 117 MG/DL — HIGH (ref 70–99)
HCT VFR BLD CALC: 42 % — SIGNIFICANT CHANGE UP (ref 39–50)
HGB BLD-MCNC: 14 G/DL — SIGNIFICANT CHANGE UP (ref 13–17)
IANC: 2.93 K/UL — SIGNIFICANT CHANGE UP (ref 1.8–7.4)
LYMPHOCYTES # BLD AUTO: 17.8 % — SIGNIFICANT CHANGE UP (ref 13–44)
LYMPHOCYTES # BLD AUTO: 2.44 K/UL — SIGNIFICANT CHANGE UP (ref 1–3.3)
MAGNESIUM SERPL-MCNC: 2.7 MG/DL — HIGH (ref 1.6–2.6)
MCHC RBC-ENTMCNC: 30.6 PG — SIGNIFICANT CHANGE UP (ref 27–34)
MCHC RBC-ENTMCNC: 33.3 GM/DL — SIGNIFICANT CHANGE UP (ref 32–36)
MCV RBC AUTO: 91.9 FL — SIGNIFICANT CHANGE UP (ref 80–100)
MONOCYTES # BLD AUTO: 0.7 K/UL — SIGNIFICANT CHANGE UP (ref 0–0.9)
MONOCYTES NFR BLD AUTO: 5.1 % — SIGNIFICANT CHANGE UP (ref 2–14)
NEUTROPHILS # BLD AUTO: 3.37 K/UL — SIGNIFICANT CHANGE UP (ref 1.8–7.4)
NEUTROPHILS NFR BLD AUTO: 22.9 % — LOW (ref 43–77)
PHOSPHATE SERPL-MCNC: 3.4 MG/DL — SIGNIFICANT CHANGE UP (ref 2.5–4.5)
PLATELET # BLD AUTO: 373 K/UL — SIGNIFICANT CHANGE UP (ref 150–400)
POTASSIUM SERPL-MCNC: 4.3 MMOL/L — SIGNIFICANT CHANGE UP (ref 3.5–5.3)
POTASSIUM SERPL-SCNC: 4.3 MMOL/L — SIGNIFICANT CHANGE UP (ref 3.5–5.3)
RBC # BLD: 4.57 M/UL — SIGNIFICANT CHANGE UP (ref 4.2–5.8)
RBC # FLD: 13.8 % — SIGNIFICANT CHANGE UP (ref 10.3–14.5)
SODIUM SERPL-SCNC: 138 MMOL/L — SIGNIFICANT CHANGE UP (ref 135–145)
WBC # BLD: 13.68 K/UL — HIGH (ref 3.8–10.5)
WBC # FLD AUTO: 13.68 K/UL — HIGH (ref 3.8–10.5)

## 2023-10-01 PROCEDURE — 99233 SBSQ HOSP IP/OBS HIGH 50: CPT | Mod: FS

## 2023-10-01 RX ADMIN — Medication 3 MILLIGRAM(S): at 21:09

## 2023-10-01 RX ADMIN — Medication 750 MILLIGRAM(S): at 21:08

## 2023-10-01 RX ADMIN — ENTECAVIR 0.5 MILLIGRAM(S): 0.5 TABLET ORAL at 14:11

## 2023-10-01 RX ADMIN — Medication 750 MILLIGRAM(S): at 14:10

## 2023-10-01 RX ADMIN — Medication 400 MILLIGRAM(S): at 17:55

## 2023-10-01 RX ADMIN — ENOXAPARIN SODIUM 40 MILLIGRAM(S): 100 INJECTION SUBCUTANEOUS at 21:10

## 2023-10-01 RX ADMIN — Medication 5 MILLIGRAM(S): at 14:10

## 2023-10-01 RX ADMIN — Medication 400 MILLIGRAM(S): at 05:42

## 2023-10-01 RX ADMIN — TAMSULOSIN HYDROCHLORIDE 0.4 MILLIGRAM(S): 0.4 CAPSULE ORAL at 21:09

## 2023-10-01 RX ADMIN — Medication 750 MILLIGRAM(S): at 05:42

## 2023-10-01 NOTE — PROGRESS NOTE ADULT - SUBJECTIVE AND OBJECTIVE BOX
Patient is a 63y old  Male who presents with a chief complaint of resp infection (30 Sep 2023 13:39)      SUBJECTIVE / OVERNIGHT EVENTS:    No acute events overnight. Pt is seen and examined at bedside.       MEDICATIONS  (STANDING):  acyclovir   Oral Tab/Cap 400 milliGRAM(s) Oral two times a day  enoxaparin Injectable 40 milliGRAM(s) SubCutaneous every 24 hours  entecavir 0.5 milliGRAM(s) Oral daily  metroNIDAZOLE    Tablet 750 milliGRAM(s) Oral three times a day  oxybutynin XL 5 milliGRAM(s) Oral daily  tamsulosin 0.4 milliGRAM(s) Oral at bedtime    MEDICATIONS  (PRN):  acetaminophen     Tablet .. 650 milliGRAM(s) Oral every 6 hours PRN Temp greater or equal to 38C (100.4F), Mild Pain (1 - 3)  albuterol/ipratropium for Nebulization 3 milliLiter(s) Nebulizer every 6 hours PRN Bronchospasm  benzonatate 100 milliGRAM(s) Oral every 8 hours PRN Cough  melatonin 3 milliGRAM(s) Oral at bedtime PRN Insomnia      Vital Signs Last 24 Hrs  T(C): 36.4 (01 Oct 2023 10:19), Max: 36.9 (30 Sep 2023 21:25)  T(F): 97.5 (01 Oct 2023 10:19), Max: 98.5 (30 Sep 2023 21:25)  HR: 96 (01 Oct 2023 10:19) (91 - 103)  BP: 122/75 (01 Oct 2023 10:19) (100/67 - 122/75)  BP(mean): --  RR: 18 (01 Oct 2023 10:19) (15 - 19)  SpO2: 99% (01 Oct 2023 10:19) (95% - 99%)    Parameters below as of 01 Oct 2023 10:19  Patient On (Oxygen Delivery Method): room air          PHYSICAL EXAM  GENERAL: NAD, well-developed  HEAD:  Atraumatic, Normocephalic  EYES: EOMI, PERRLA, conjunctiva and sclera clear  NECK: Supple, No JVD  CHEST/LUNG: Clear to auscultation bilaterally; No wheeze  HEART: Regular rate and rhythm; No murmurs, rubs, or gallops  ABDOMEN: Soft, Nontender, Nondistended; Bowel sounds present  EXTREMITIES:  2+ Peripheral Pulses, No clubbing, cyanosis, or edema  PSYCH: AAOx3  SKIN: No rashes or lesions    CAPILLARY BLOOD GLUCOSE        I&O's Summary      LABS:                        14.0   13.68 )-----------( 373      ( 01 Oct 2023 05:57 )             42.0     10-01    138  |  99  |  14  ----------------------------<  117<H>  4.3   |  25  |  1.06    Ca    8.9      01 Oct 2023 05:57  Phos  3.4     10-01  Mg     2.70     10-01            Urinalysis Basic - ( 01 Oct 2023 05:57 )    Color: x / Appearance: x / SG: x / pH: x  Gluc: 117 mg/dL / Ketone: x  / Bili: x / Urobili: x   Blood: x / Protein: x / Nitrite: x   Leuk Esterase: x / RBC: x / WBC x   Sq Epi: x / Non Sq Epi: x / Bacteria: x        RADIOLOGY & ADDITIONAL TESTS:     MICROBIOLOGY:    ANTIMICROBIALS:    CONSULTS:

## 2023-10-02 LAB
ANION GAP SERPL CALC-SCNC: 13 MMOL/L — SIGNIFICANT CHANGE UP (ref 7–14)
BASOPHILS # BLD AUTO: 0.1 K/UL — SIGNIFICANT CHANGE UP (ref 0–0.2)
BASOPHILS NFR BLD AUTO: 0.8 % — SIGNIFICANT CHANGE UP (ref 0–2)
BUN SERPL-MCNC: 15 MG/DL — SIGNIFICANT CHANGE UP (ref 7–23)
CALCIUM SERPL-MCNC: 8.8 MG/DL — SIGNIFICANT CHANGE UP (ref 8.4–10.5)
CHLORIDE SERPL-SCNC: 100 MMOL/L — SIGNIFICANT CHANGE UP (ref 98–107)
CHROM ANALY INTERPHASE BLD FISH-IMP: SIGNIFICANT CHANGE UP
CO2 SERPL-SCNC: 24 MMOL/L — SIGNIFICANT CHANGE UP (ref 22–31)
CREAT SERPL-MCNC: 1.06 MG/DL — SIGNIFICANT CHANGE UP (ref 0.5–1.3)
EGFR: 78 ML/MIN/1.73M2 — SIGNIFICANT CHANGE UP
EOSINOPHIL # BLD AUTO: 6.82 K/UL — HIGH (ref 0–0.5)
EOSINOPHIL NFR BLD AUTO: 56 % — HIGH (ref 0–6)
GALACTOMANNAN AG SERPL-ACNC: 0.05 INDEX — SIGNIFICANT CHANGE UP (ref 0–0.49)
GLUCOSE SERPL-MCNC: 114 MG/DL — HIGH (ref 70–99)
HCT VFR BLD CALC: 41.1 % — SIGNIFICANT CHANGE UP (ref 39–50)
HGB BLD-MCNC: 13.5 G/DL — SIGNIFICANT CHANGE UP (ref 13–17)
HLX FLT3 FINAL REPORT: SIGNIFICANT CHANGE UP
IANC: 2.63 K/UL — SIGNIFICANT CHANGE UP (ref 1.8–7.4)
IMM GRANULOCYTES NFR BLD AUTO: 2.1 % — HIGH (ref 0–0.9)
LYMPHOCYTES # BLD AUTO: 1.36 K/UL — SIGNIFICANT CHANGE UP (ref 1–3.3)
LYMPHOCYTES # BLD AUTO: 11.2 % — LOW (ref 13–44)
MAGNESIUM SERPL-MCNC: 2.4 MG/DL — SIGNIFICANT CHANGE UP (ref 1.6–2.6)
MCHC RBC-ENTMCNC: 30.3 PG — SIGNIFICANT CHANGE UP (ref 27–34)
MCHC RBC-ENTMCNC: 32.8 GM/DL — SIGNIFICANT CHANGE UP (ref 32–36)
MCV RBC AUTO: 92.4 FL — SIGNIFICANT CHANGE UP (ref 80–100)
MONOCYTES # BLD AUTO: 1.02 K/UL — HIGH (ref 0–0.9)
MONOCYTES NFR BLD AUTO: 8.4 % — SIGNIFICANT CHANGE UP (ref 2–14)
NEUTROPHILS # BLD AUTO: 2.63 K/UL — SIGNIFICANT CHANGE UP (ref 1.8–7.4)
NEUTROPHILS NFR BLD AUTO: 21.5 % — LOW (ref 43–77)
NRBC # BLD: 0 /100 WBCS — SIGNIFICANT CHANGE UP (ref 0–0)
NRBC # FLD: 0 K/UL — SIGNIFICANT CHANGE UP (ref 0–0)
PHOSPHATE SERPL-MCNC: 3.6 MG/DL — SIGNIFICANT CHANGE UP (ref 2.5–4.5)
PLATELET # BLD AUTO: 339 K/UL — SIGNIFICANT CHANGE UP (ref 150–400)
POTASSIUM SERPL-MCNC: 4.2 MMOL/L — SIGNIFICANT CHANGE UP (ref 3.5–5.3)
POTASSIUM SERPL-SCNC: 4.2 MMOL/L — SIGNIFICANT CHANGE UP (ref 3.5–5.3)
RBC # BLD: 4.45 M/UL — SIGNIFICANT CHANGE UP (ref 4.2–5.8)
RBC # FLD: 14.1 % — SIGNIFICANT CHANGE UP (ref 10.3–14.5)
SODIUM SERPL-SCNC: 137 MMOL/L — SIGNIFICANT CHANGE UP (ref 135–145)
WBC # BLD: 12.18 K/UL — HIGH (ref 3.8–10.5)
WBC # FLD AUTO: 12.18 K/UL — HIGH (ref 3.8–10.5)

## 2023-10-02 PROCEDURE — 99232 SBSQ HOSP IP/OBS MODERATE 35: CPT

## 2023-10-02 RX ADMIN — Medication 750 MILLIGRAM(S): at 07:29

## 2023-10-02 RX ADMIN — ENOXAPARIN SODIUM 40 MILLIGRAM(S): 100 INJECTION SUBCUTANEOUS at 21:30

## 2023-10-02 RX ADMIN — Medication 5 MILLIGRAM(S): at 12:13

## 2023-10-02 RX ADMIN — TAMSULOSIN HYDROCHLORIDE 0.4 MILLIGRAM(S): 0.4 CAPSULE ORAL at 21:30

## 2023-10-02 RX ADMIN — Medication 400 MILLIGRAM(S): at 07:29

## 2023-10-02 RX ADMIN — Medication 400 MILLIGRAM(S): at 21:30

## 2023-10-02 RX ADMIN — ENTECAVIR 0.5 MILLIGRAM(S): 0.5 TABLET ORAL at 22:19

## 2023-10-02 NOTE — PROGRESS NOTE ADULT - PROBLEM/PLAN-5
DISPLAY PLAN FREE TEXT
Odomzo Pregnancy And Lactation Text: This medication is Pregnancy Category X and is absolutely contraindicated during pregnancy. It is unknown if it is excreted in breast milk.

## 2023-10-02 NOTE — PROGRESS NOTE ADULT - PROBLEM SELECTOR PLAN 4
Incidental 5 mm right middle lobe nodule and mildly enlarged subcarinal lymph node.   - radiology recommended follow-up chest CT in 1-3 months to determine resolution.
c/w tamsulosin
Full code  Continue medical management  See goals of care note above for details
Incidental 5 mm right middle lobe nodule and mildly enlarged subcarinal lymph node.   - radiology recommended follow-up chest CT in 1-3 months to determine resolution.

## 2023-10-02 NOTE — PROGRESS NOTE ADULT - PROBLEM SELECTOR PROBLEM 2
CAP (community acquired pneumonia)
Eosinophilia
Follicular lymphoma
Eosinophilia
CAP (community acquired pneumonia)
Follicular lymphoma
Eosinophilia
Follicular lymphoma
CAP (community acquired pneumonia)
Eosinophilia
Eosinophilia
Follicular lymphoma
CAP (community acquired pneumonia)
CAP (community acquired pneumonia)
Follicular lymphoma

## 2023-10-02 NOTE — PROGRESS NOTE ADULT - PROBLEM/PLAN-6
DISPLAY PLAN FREE TEXT
Detail Level: Detailed
Quality 130: Documentation Of Current Medications In The Medical Record: Current Medications Documented
DISPLAY PLAN FREE TEXT
Quality 402: Tobacco Use And Help With Quitting Among Adolescents: Patient screened for tobacco and never smoked
Quality 431: Preventive Care And Screening: Unhealthy Alcohol Use - Screening: Patient not identified as an unhealthy alcohol user when screened for unhealthy alcohol use using a systematic screening method
DISPLAY PLAN FREE TEXT
DISPLAY PLAN FREE TEXT
Quality 110: Preventive Care And Screening: Influenza Immunization: Influenza Immunization Administered during Influenza season
DISPLAY PLAN FREE TEXT

## 2023-10-02 NOTE — PROGRESS NOTE ADULT - PROBLEM SELECTOR PLAN 7
DVTppx: LMWH   Diet: Halal  Dispo: pending medical workup
DVTppx: LMWH   Diet: Halal  DIspo: pending medical workup
DVTppx: LMWH   Diet: Halal  Dispo: pending BMB,
DVTppx: LMWH   Diet: Halal  DIspo: pending medical workup
son and pt unsure of current meds; f/u pharmacy recs
DVTppx: LMWH   Diet: Halal  DIspo: pending medical workup

## 2023-10-02 NOTE — PROGRESS NOTE ADULT - SUBJECTIVE AND OBJECTIVE BOX
Follow Up:  pneumonia, eosinophilia    Interval History/ROS: pt afebrile, improved cough and SOB, eos are down trending, s/p bone marrow biopsy            Allergies  No Known Allergies        ANTIMICROBIALS:  acyclovir   Oral Tab/Cap 400 two times a day  entecavir 0.5 daily      OTHER MEDS:  acetaminophen     Tablet .. 650 milliGRAM(s) Oral every 6 hours PRN  albuterol/ipratropium for Nebulization 3 milliLiter(s) Nebulizer every 6 hours PRN  benzonatate 100 milliGRAM(s) Oral every 8 hours PRN  enoxaparin Injectable 40 milliGRAM(s) SubCutaneous every 24 hours  melatonin 3 milliGRAM(s) Oral at bedtime PRN  oxybutynin XL 5 milliGRAM(s) Oral daily  tamsulosin 0.4 milliGRAM(s) Oral at bedtime      Vital Signs Last 24 Hrs  T(C): 36.7 (02 Oct 2023 14:52), Max: 36.8 (01 Oct 2023 17:56)  T(F): 98 (02 Oct 2023 14:52), Max: 98.3 (02 Oct 2023 07:34)  HR: 93 (02 Oct 2023 14:52) (90 - 99)  BP: 114/79 (02 Oct 2023 14:52) (105/66 - 114/79)  BP(mean): --  RR: 18 (02 Oct 2023 14:52) (18 - 19)  SpO2: 98% (02 Oct 2023 14:52) (95% - 98%)    Parameters below as of 02 Oct 2023 14:52  Patient On (Oxygen Delivery Method): room air        Physical Exam:  General:    NAD,  non toxic  Respiratory:    comfortable on RA  abd:     soft,   no tenderness  :   no CVAT,  no suprapubic tenderness,   no  miller  Musculoskeletal:   no joint swelling  vascular: no phlebitis  Skin:    no rash                          13.5   12.18 )-----------( 339      ( 02 Oct 2023 06:17 )             41.1       10-02    137  |  100  |  15  ----------------------------<  114<H>  4.2   |  24  |  1.06    Ca    8.8      02 Oct 2023 06:17  Phos  3.6     10-02  Mg     2.40     10-02        Urinalysis Basic - ( 02 Oct 2023 06:17 )    Color: x / Appearance: x / SG: x / pH: x  Gluc: 114 mg/dL / Ketone: x  / Bili: x / Urobili: x   Blood: x / Protein: x / Nitrite: x   Leuk Esterase: x / RBC: x / WBC x   Sq Epi: x / Non Sq Epi: x / Bacteria: x        MICROBIOLOGY:  v  .Other Specimen Source: Sputum  09-29-23   No ova or parasites observed by direct wet preparation.  No Protozoa seen by trichrome stain  Few WBC's  --  --      .Sputum Sputum  09-21-23   No acid-fast bacilli isolated at 1 week. ****Acid-fast cultures are held  for 6 weeks.****  --  --      .Sputum Sputum  09-21-23   No acid-fast bacilli isolated at 1 week. ****Acid-fast cultures are held  for 6 weeks.****  --  --      .Sputum Sputum  09-20-23   No acid-fast bacilli isolated at 1 week. ****Acid-fast cultures are held  for 6 weeks.****  --  --      .Blood Blood-Peripheral  09-20-23   No growth at 5 days  --  --      .Blood Blood-Peripheral  09-20-23   No growth at 5 days  --  --      .Sputum Sputum  09-20-23   Normal Respiratory Ritu present  --    Few polymorphonuclear leukocytes per low power field  Moderate Squamous epithelial cells per low power field  Moderate Gram Positive Cocci in Pairs and Chains per oil power field  Few Gram Positive Rods per oil power field  Few Gram Negative Rods per oil power field                RADIOLOGY:  Images independently visualized and reviewed personally, findings as below  < from: CT Abdomen and Pelvis w/ IV Cont (09.22.23 @ 19:14) >  IMPRESSION:  Right lower lobe pneumonia and left basilar opacity which could represent   atelectasis versus pneumonia.    Retroperitoneal and mesenteric soft tissue masses, without significant   change overall since prior study, in keeping with history of lymphoma.    Questionable rectal and descending colon wall thickening. Correlate for   symptoms of proctocolitis.    < end of copied text >

## 2023-10-02 NOTE — PROGRESS NOTE ADULT - PROBLEM SELECTOR PLAN 2
Unclear if related to lymphoma. DDx parasitic infection, fungal, TB  - O&P -> blastocystis hominis - could be pathogenic given eosinophilia / immunocompromised state   - Flagyl 750 TID x 10 days (thorough 10/2)  [ ] fungal /helminth serological workup pending   - monitor CBC w/ diff
Follows with Dr. Alondra Mcgill ([phone]).   -Per Onc diagnosed with Follicular Lymphoma in early 2022. s/p 6 cycles of Rituximab-Bendamustine (March 2022 - August 2022) currently on maintenance Rituximab every 8 weeks (last Dose 8/5/23)  - CT chest completed  [ ] CT a/p w/ for staging
Unclear if related to lymphoma. DDx parasitic infection, fungal, TB  - O&P -> blastocystis hominis - could be pathogenic given eosinophilia / immunocompromised state   - Flagyl 750 TID x 10 days (thorough 10/2)  [ ] fungal /helminth serological workup pending,   - monitor CBC w/ diff
# Sepsis secondary to pneumonia   - CT chest showed bilateral lower lobe consolidation R<L  - possible stemming from rhinovirus infection; however, superimposed bacterial infection possible especially given improvement with ABX. DDX include eosinophilic pneumonia, helminth infection, fungal, less likely EGPA   - Sputum culture with normal resp willie   - AFB x 3 smear neg for AFB, quant gold negative   - BCx with NGTD (although limited as obtained after ABX)  - s/p zosyn 7 day course, (9/20-9/27)   - pulm consulted, no plan for bronch at this time, recommending OP follow up   - chest PT, incentive spirometry, tessalon, tylenol
Follow up with Dr. Oncologist Dr. Alondra Mcgill ([facility]) outpatient for continued management of lymphoma
Unclear if related to lymphoma. DDx parasitic infection, fungal, TB  - O&P -> blastocystis hominis - could be pathogenic given eosinophilia / immunocompromised state   - Flagyl 750 TID x 10 days (thorough 10/2)  [ ] fungal /helminth serological workup pending   - monitor CBC w/ diff
Follows with Dr. Alondra Mcgill ([phone]).   -Per Onc diagnosed with Follicular Lymphoma in early 2022. s/p 6 cycles of Rituximab-Bendamustine (March 2022 - August 2022) currently on maintenance Rituximab every 8 weeks (last Dose 8/5/23)  - CT chest pending read   [ ] CT a/p w/ for staging
Unclear if related to lymphoma. DDx parasitic infection, fungal, TB  - O&P -> blastocystis hominis - could be pathogenic given eosinophilia / immunocompromised state   - Flagyl 750 TID x 10 days (thorough 10/2)  [ ] fungal /helminth serological workup pending   - monitor CBC w/ diff
# Sepsis secondary to pneumonia   - CT chest showed bilateral lower lobe consolidation R<L  - possible stemming from rhinovirus infection; however, superimposed bacterial infection possible especially given improvement with ABX. DDX include eosinophilic pneumonia, helminth infection, fungal, less likely EGPA   - Sputum culture with normal resp willie   - AFB x 3 smear neg for AFB, quant gold negative   - BCx with NGTD (although limited as obtained after ABX)  - s/p zosyn 7 day course, (9/20-9/27)   - pulm consulted, no plan for bronch at this time, recommending OP follow up   - chest PT, incentive spirometry, tessalon, tylenol
Follows with Dr. Alondra Mcgill ([phone]).   -Per Onc diagnosed with Follicular Lymphoma in early 2022. s/p 6 cycles of Rituximab-Bendamustine (March 2022 - August 2022) currently on maintenance Rituximab every 8 weeks (last Dose 8/5/23)  - CT chest completed  [ ] CT a/p w/ for staging
# Sepsis secondary to pneumonia   - CT chest showed bilateral lower lobe consolidation R<L  - possible stemming from rhinovirus infection; however, superimposed bacterial infection possible especially given improvement with ABX. DDX include eosinophilic pneumonia, helminth infection, fungal, less likely EGPA   - Sputum culture with normal resp willie   - AFB x 3 smear neg for AFB, quant gold negative   - BCx with NGTD (although limited as obtained after ABX)  - s/p zosyn 7 day course, (9/20-9/27)   - pulm consulted, no plan for bronch at this time, recommending OP follow up   - chest PT, incentive spirometry, tessalon, tylenol
Follows with Dr. Alondra Mcgill ([phone]).   -Per Onc diagnosed with Follicular Lymphoma in early 2022. s/p 6 cycles of Rituximab-Bendamustine (March 2022 - August 2022) currently on maintenance Rituximab every 8 weeks (last Dose 8/5/23)  - CT chest pending read   [ ] CT a/p w/ for staging

## 2023-10-02 NOTE — PROGRESS NOTE ADULT - ASSESSMENT
63 m with non hodgkin's lymphoma on rituxan last 8/28, has had cough, SOB which started a month ago and got worse from dry cough to productive, also has no appetite with intermittent diarrhea, vomiting and abd pain, he just came back from a16 day trip to [facility] but his symptoms started before the trip and was given antibiotics in [facility], initially felt better but then again worsened   here febrile to 100.7  WBC: 14, eso: 9.52  RVP: entero/rhino  CXR clear    immunocompromised pt with non hodgkin's lymphoma on ritixan with a month of cough and SOB, progressively worsening, also just came back from [facility] but symptoms started  before, here with fever and eosinophilia which was present before but now [id number], negative strongyloides toxocara, trichinella, coccidioides, histo Ag, aspergillus  entero/rhino pneumonia vs eosinophilic pneumonia  CT with bibasilar pneumonia s/p 7 days of zosyn  quantiferon negative, AFB smear x 3 negative  pt had some diarrhea and O&P now with moderate blastomyces hominis  blastomyces is usually non pathogenic but as pt is immunocompromised with diarrhea and eosinophilia treated with 10 days ofmetro 750 tid   s/p bone marrow biopsy by hem, eos now down trending to 6  * completed the 7 day course of zosyn   * f/u with hem/onc   * monitor CBc/diff and CMP  * will sign off, please call with questions      The above assessment and plan was discussed with the primary team    Malou Cortes MD  contact on teams  After 5pm and on weekends call [phone]

## 2023-10-02 NOTE — PROGRESS NOTE ADULT - PROBLEM SELECTOR PLAN 6
c/w home entecavir
c/w home entecavir
Lovenox
c/w home entecavir

## 2023-10-02 NOTE — PROGRESS NOTE ADULT - PROBLEM SELECTOR PROBLEM 1
Eosinophilia
CAP (community acquired pneumonia)
Pneumonia
Eosinophilia
Eosinophilia
CAP (community acquired pneumonia)
Eosinophilia
CAP (community acquired pneumonia)
Lung infection
Eosinophilia
CAP (community acquired pneumonia)

## 2023-10-02 NOTE — PROGRESS NOTE ADULT - PROBLEM SELECTOR PROBLEM 5
BPH (benign prostatic hyperplasia)
Hepatitis B
BPH (benign prostatic hyperplasia)
Encounter for palliative care

## 2023-10-02 NOTE — PROGRESS NOTE ADULT - PROBLEM SELECTOR PROBLEM 6
Hepatitis B
Encounter for deep vein thrombosis (DVT) prophylaxis
Hepatitis B
Hepatitis B

## 2023-10-02 NOTE — PROGRESS NOTE ADULT - PROBLEM SELECTOR PLAN 1
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 Differentials include eosinophilic pneumonia, less likely fungal, parasitic process  - Stool O&P -> blastocystis hominis - low suspicion that this is pathogenic, especially given minimal response to flagyl, currently treating given immunocompromised state   - Flagyl 750 TID x 10 days (thorough 10/2), can repeat O&P afterwards   - w/u negative for ISAIAH, Coccidio Ab, fungitell, aspergillus Ab, histoplasma ab, strongyloides ab, toxocara ab, filaria ab, schistosoma ab, trichinella ab,   [ ] IgE<2, f/u remainder of immunoglobulins   [ ] PDGFR A, PDGFR B, FGFR 3, JAK2, Kit mutation  - monitor CBC w/ diff daily
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 Differentials include eosinophilic pneumonia, less likely fungal, parasitic process  - Stool O&P -> blastocystis hominis - low suspicion that this is pathogenic, especially given minimal response to flagyl, currently treating given immunocompromised state   - Flagyl 750 TID x 10 days (thorough 10/2), can repeat O&P afterwards   - w/u negative for ISAIAH, Coccidio Ab, fungitell, aspergillus Ab, histoplasma ab, strongyloides ab, toxocara ab, filaria ab, schistosoma ab, trichinella ab,   [ ] f/u molecular path: PDGFR A, PDGFR B, FGFR 3, JAK2, Kit mutation  - s/p BM bx. Heme recommended no steroid as eosinophils are downtrending   - monitor CBC w/ diff daily
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 Differentials include eosinophilic pneumonia, less likely fungal, parasitic process  - Stool O&P -> blastocystis hominis - low suspicion that this is pathogenic, especially given minimal response to flagyl, currently treating given immunocompromised state   - Flagyl 750 TID x 10 days (thorough 10/2), can repeat O&P afterwards   - w/u negative for ISAIAH, Coccidio Ab, fungitell, aspergillus Ab, histoplasma ab, strongyloides ab, toxocara ab, filaria ab, schistosoma ab, trichinella ab,   [ ] f/u molecular path: PDGFR A, PDGFR B, FGFR 3, JAK2, Kit mutation  - may need BMB per Heme, hold steroids until after BMB   - monitor CBC w/ diff daily
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 Differentials include eosinophilic pneumonia, less likely fungal, parasitic process  - Stool O&P -> blastocystis hominis - low suspicion that this is pathogenic, especially given minimal response to flagyl, currently treating given immunocompromised state   - Flagyl 750 TID x 10 days (thorough 10/2), can repeat O&P afterwards   - w/u negative for ISAIAH, Coccidio Ab, fungitell, aspergillus Ab, histoplasma ab, strongyloides ab, toxocara ab, filaria ab, schistosoma ab, trichinella ab,   [ ] f/u molecular path: PDGFR A, PDGFR B, FGFR 3, JAK2, Kit mutation  - s/p BM bx. Heme recommended no steroid as eosinophils are downtrending   - monitor CBC w/ diff daily
# Sepsis secondary to pneumonia   - CT chest showed bilateral lower lobe consolidation R<L  - possible stemming from rhinovirus infection; however, superimposed bacterial infection possible especially given improvement with ABX. DDX include eosinophilic pneumonia, helminth infection, fungal, less likely EGPA   - Sputum culture with normal resp willie   - AFB x 3 smear neg for AFB, quant gold negative   - BCx with NGTD (although limited as obtained after ABX)  - continue with zosyn for now, (9/20-9/27)   - pulm consulted, no plan for bronch at this time, recommending OP follow up   - chest PT, incentive spirometry, tessalon, tylenol
# Sepsis secondary to pneumonia   - CT chest showed bilateral lower lobe consolidation R<L  - possible stemming from rhinovirus infection; however, superimposed bacterial infection possible especially given improvement with ABX. DDX include eosinophilic pneumonia, helminth infection, fungal, less likely EGPA   - Sputum culture with GNRs, GPR, GPCs on smear   - AFB x 3 smear neg for AFB, quant gold negative   - BCx with NGTD (although limited as obtained after ABX)  - continue with zosyn for now, (9/20-  - chest PT, incentive spirometry, tessalon, tylenol
# Sepsis secondary to pneumonia   - CT chest showed bilateral lower lobe consolidation R<L  - possible stemming from rhinovirus infection; however, superimposed bacterial infection possible especially given improvement with ABX. DDX include eosinophilic pneumonia, helminth infection, fungal, less likely EGPA   - Sputum culture with GNRs, GPR, GPCs on smear   - AFB x 3 smear neg for AFB, quant gold negative   - BCx with NGTD (although limited as obtained after ABX)  - continue with zosyn for now, (9/20-  - minimal improvement despite antibiotics, consider pulm consult for possible bronch   - chest PT, incentive spirometry, tessalon, tylenol
# Sepsis secondary to pneumonia   - CT chest showed bilateral lower lobe consolidation R<L  - possible stemming from rhinovirus infection; however, superimposed bacterial infection possible especially given improvement with ABX. DDX include eosinophilic pneumonia, helminth infection, fungal, less likely EGPA   - Sputum culture with normal resp willie   - AFB x 3 smear neg for AFB, quant gold negative   - BCx with NGTD (although limited as obtained after ABX)  - continue with zosyn for now, (9/20-  - minimal improvement despite antibiotics, pulm consulted, no plan for bronch at this time   - chest PT, incentive spirometry, tessalon, tylenol
# Sepsis secondary to pneumonia   - SIRS positive with fevers to 100.7, tachy to , also with some tachypnea   - CT chest pending official read but on my review showed RLL consolidation   - possible stemming from rhinovirus infection; however, superimposed bacterial infection including TB given country of origin and rituxan use  - sputum, AFB sputum and gold QuantiFeron ordered, blood cultures
Patient currently admitted for pneumonia, in the setting of viral infection or possibly eosinophilic pna. Patient has been evaluated by ID and pulmonary teams, currently on Zosyn/Flagyl/entecavir and TB ruled out.  At this time pending further diagnostic workup.   -Supplemental oxygen and antibiotics per primary/ID teams.
# Sepsis secondary to pneumonia   - CT chest showed bilateral lower lobe consolidation R<L  - possible stemming from rhinovirus infection; however, superimposed bacterial infection possible especially given improvement with ABX. DDX include eosinophilic pneumonia, helminth infection, fungal, less likely EGPA   - Sputum culture with GNRs, GPR, GPCs on smear   - AFB x 3 smear neg for AFB, quant gold negative   - BCx with NGTD (although limited as obtained after ABX)  - continue with zosyn for now, (9/20-  - chest PT, incentive spirometry, tessalon, tylenol
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 Differentials include eosinophilic pneumonia, less likely fungal, parasitic process  - Stool O&P -> blastocystis hominis - low suspicion that this is pathogenic, especially given minimal response to flagyl, currently treating given immunocompromised state   - Flagyl 750 TID x 10 days (thorough 10/2), can repeat O&P afterwards   - w/u negative for ISAIAH, Coccidio Ab, fungitell, aspergillus Ab, histoplasma ab, strongyloides ab, toxocara ab, filaria ab, schistosoma ab, trichinella ab,   [ ] f/u molecular path: PDGFR A, PDGFR B, FGFR 3, JAK2, Kit mutation  - s/p BM bx. Heme recommended no steroid as eosinophils are downtrending   - monitor CBC w/ diff daily
# Sepsis secondary to pneumonia   - CT chest showed bilateral lower lobe consolidation R<L  - possible stemming from rhinovirus infection; however, superimposed bacterial infection possible especially given improvement with ABX. DDX include eosinophilic pneumonia, helminth infection, fungal, less likely EGPA   - Sputum culture with normal resp willie   - AFB x 3 smear neg for AFB, quant gold negative   - BCx with NGTD (although limited as obtained after ABX)  - continue with zosyn for now, (9/20-  - minimal improvement despite antibiotics, pulm consulted for eosinophilic pna assessment vs possible bronch   - chest PT, incentive spirometry, tessalon, tylenol
# Sepsis secondary to pneumonia   - CT chest showed bilateral lower lobe consolidation R<L  - possible stemming from rhinovirus infection; however, superimposed bacterial infection including TB given country of origin and rituxan use  - sputum, AFB sputum and gold QuantiFeron ordered, blood cultures

## 2023-10-02 NOTE — PROGRESS NOTE ADULT - PROBLEM SELECTOR PROBLEM 4
Pulmonary nodules
Advanced care planning/counseling discussion
Pulmonary nodules
BPH (benign prostatic hyperplasia)
Pulmonary nodules

## 2023-10-02 NOTE — PROGRESS NOTE ADULT - SUBJECTIVE AND OBJECTIVE BOX
Patient is a 63y old  Male who presents with a chief complaint of resp infection (01 Oct 2023 13:44)    Sinan Cormier MD   [facility] Division of Hospital Medicine   Pager [phone]  Reachable on Microsoft Teams     SUBJECTIVE / OVERNIGHT EVENTS:  Patient seen and examined this morning. No events overnight.  States that breathing is a little bit better.     MEDICATIONS  (STANDING):  acyclovir   Oral Tab/Cap 400 milliGRAM(s) Oral two times a day  enoxaparin Injectable 40 milliGRAM(s) SubCutaneous every 24 hours  entecavir 0.5 milliGRAM(s) Oral daily  oxybutynin XL 5 milliGRAM(s) Oral daily  tamsulosin 0.4 milliGRAM(s) Oral at bedtime    MEDICATIONS  (PRN):  acetaminophen     Tablet .. 650 milliGRAM(s) Oral every 6 hours PRN Temp greater or equal to 38C (100.4F), Mild Pain (1 - 3)  albuterol/ipratropium for Nebulization 3 milliLiter(s) Nebulizer every 6 hours PRN Bronchospasm  benzonatate 100 milliGRAM(s) Oral every 8 hours PRN Cough  melatonin 3 milliGRAM(s) Oral at bedtime PRN Insomnia      Vital Signs Last 24 Hrs  T(C): 36.8 (02 Oct 2023 07:34), Max: 36.8 (01 Oct 2023 17:56)  T(F): 98.3 (02 Oct 2023 07:34), Max: 98.3 (02 Oct 2023 07:34)  HR: 94 (02 Oct 2023 07:34) (90 - 99)  BP: 110/67 (02 Oct 2023 07:34) (105/66 - 110/67)  BP(mean): --  RR: 18 (02 Oct 2023 07:34) (18 - 19)  SpO2: 95% (02 Oct 2023 07:34) (95% - 98%)  CAPILLARY BLOOD GLUCOSE        I&O's Summary    General: man laying down in bed appears comfortable in NAD, awake and alert  HENMT: MMM   Respiratory: No respiratory distress, decreased breath sounds in the bases, No wheezing.  Cardiovascular: S1,S2; No m/g/r.  Gastrointestinal: Soft, Nontender, Nondistended; +BS.   Extremities: No c/c/e; warm to touch  Skin: No rashes, No erythema   Psych: appropriate mood and affect    LABS:                        13.5   12.18 )-----------( 339      ( 02 Oct 2023 06:17 )             41.1     10-02    137  |  100  |  15  ----------------------------<  114<H>  4.2   |  24  |  1.06    Ca    8.8      02 Oct 2023 06:17  Phos  3.6     10-02  Mg     2.40     10-02            Urinalysis Basic - ( 02 Oct 2023 06:17 )    Color: x / Appearance: x / SG: x / pH: x  Gluc: 114 mg/dL / Ketone: x  / Bili: x / Urobili: x   Blood: x / Protein: x / Nitrite: x   Leuk Esterase: x / RBC: x / WBC x   Sq Epi: x / Non Sq Epi: x / Bacteria: x        RADIOLOGY & ADDITIONAL TESTS:    Imaging Personally Reviewed:    Consultant(s) Notes Reviewed:      Care Discussed with Consultants/Other Providers:

## 2023-10-02 NOTE — PROGRESS NOTE ADULT - PROBLEM SELECTOR PLAN 3
Follows with Dr. Alondra Mcgill ([phone]).   -Per Onc diagnosed with Follicular Lymphoma in early 2022. s/p 6 cycles of Rituximab-Bendamustine (March 2022 - August 2022) currently on maintenance Rituximab every 8 weeks (last Dose 8/5/23)  - IgE<2, IgM <5, IgG borderline low at 600s; defer to heme regarding IVIG   - CT chest, CT a/p w/ for staging completed
Unclear if related to lymphoma. DDx parasitic infection, fungal, TB  - O&P -> blastocystis hominis - could be pathogenic given eosinophilia / immunocompromised state   - Flagyl 750 TID x 10 days (thorough 10/2)  [ ] fungal /helminth serological workup pending   - monitor CBC w/ diff
Follows with Dr. Alondra Mcgill ([phone]).   -Per Onc diagnosed with Follicular Lymphoma in early 2022. s/p 6 cycles of Rituximab-Bendamustine (March 2022 - August 2022) currently on maintenance Rituximab every 8 weeks (last Dose 8/5/23)  - CT chest, CT a/p w/ for staging completed
Follows with Dr. Alondra Mcgill ([phone]).   -Per Onc diagnosed with Follicular Lymphoma in early 2022. s/p 6 cycles of Rituximab-Bendamustine (March 2022 - August 2022) currently on maintenance Rituximab every 8 weeks (last Dose 8/5/23)  - CT chest, CT a/p w/ for staging completed
Follows with Dr. Alondra Mcgill ([phone]).   -Per Onc diagnosed with Follicular Lymphoma in early 2022. s/p 6 cycles of Rituximab-Bendamustine (March 2022 - August 2022) currently on maintenance Rituximab every 8 weeks (last Dose 8/5/23)  - IgE<2, IgM <5, IgG borderline low at 600s; defer to heme regarding IVIG   - CT chest, CT a/p w/ for staging completed
Unclear if related to lymphoma. DDx parasitic infection, TB
Follows with Dr. Alondra Mcgill ([phone]).   -Per Onc diagnosed with Follicular Lymphoma in early 2022. s/p 6 cycles of Rituximab-Bendamustine (March 2022 - August 2022) currently on maintenance Rituximab every 8 weeks (last Dose 8/5/23)  - CT chest, CT a/p w/ for staging completed
Follows with Dr. Alondra Mcgill ([phone]).   -Per Onc diagnosed with Follicular Lymphoma in early 2022. s/p 6 cycles of Rituximab-Bendamustine (March 2022 - August 2022) currently on maintenance Rituximab every 8 weeks (last Dose 8/5/23)  - CT chest, CT a/p w/ for staging completed
Follows with Dr. Alondra Mcgill ([phone]).   -Per Onc diagnosed with Follicular Lymphoma in early 2022. s/p 6 cycles of Rituximab-Bendamustine (March 2022 - August 2022) currently on maintenance Rituximab every 8 weeks (last Dose 8/5/23)  - IgE<2, IgM <5, IgG borderline low at 600s; defer to heme regarding IVIG   - CT chest, CT a/p w/ for staging completed
Follows with Dr. Alondra Mcgill ([phone]).   -Per Onc diagnosed with Follicular Lymphoma in early 2022. s/p 6 cycles of Rituximab-Bendamustine (March 2022 - August 2022) currently on maintenance Rituximab every 8 weeks (last Dose 8/5/23)  - IgE<2, IgM <5, IgG borderline low at 600s; defer to heme regarding IVIG   - CT chest, CT a/p w/ for staging completed
Unclear if related to lymphoma. DDx parasitic infection, fungal, TB  - O&P -> blastocystis hominis - could be pathogenic given eosinophilia / immunocompromised state   - Flagyl 750 TID x 10 days (thorough 10/2)  [ ] fungal /helminth serological workup pending   - monitor CBC w/ diff
Unclear if related to lymphoma. DDx parasitic infection, fungal, TB  - serological workup, stool studies,
Follows with Dr. Alondra Mcgill ([phone]).   -Per Onc diagnosed with Follicular Lymphoma in early 2022. s/p 6 cycles of Rituximab-Bendamustine (March 2022 - August 2022) currently on maintenance Rituximab every 8 weeks (last Dose 8/5/23)  - CT chest, CT a/p w/ for staging completed
Resolved at this time, no further emesis

## 2023-10-02 NOTE — PROGRESS NOTE ADULT - PROBLEM SELECTOR PROBLEM 3
Eosinophilia
Follicular lymphoma
Follicular lymphoma
Eosinophilia
Follicular lymphoma
Hepatitis B
Follicular lymphoma
Eosinophilia
Nausea & vomiting
Follicular lymphoma
Follicular lymphoma
Eosinophilia

## 2023-10-02 NOTE — PROGRESS NOTE ADULT - PROBLEM SELECTOR PROBLEM 7
Encounter for deep vein thrombosis (DVT) prophylaxis
Medication management
Encounter for deep vein thrombosis (DVT) prophylaxis

## 2023-10-02 NOTE — PROGRESS NOTE ADULT - PROBLEM SELECTOR PLAN 5
#Overflow incontinence   c/w tamsulosin, oxybutynin
#Overflow incontinence   c/w tamsulosin, oxybutynin
c/w tamsulosin
#Overflow incontinence   c/w tamsulosin, oxybutynin
Thank you for allowing us to participate in your patient's care. Goals are clear, symptoms resolved. Palliative team will sign off. Please page [phone] for any q's or c's. The [facility] Pall[facility] service has coverage 24 hours a day/ 7 days a week to provide medical recommendations regarding symptom management needs via telephone.    Davon Morrison MD  Palliative Medicine
#Overflow incontinence   c/w tamsulosin, oxybutynin
on entecavir in past; unclear if currently taking
#Overflow incontinence   c/w tamsulosin, oxybutynin

## 2023-10-03 ENCOUNTER — TRANSCRIPTION ENCOUNTER (OUTPATIENT)
Age: 63
End: 2023-10-03

## 2023-10-03 VITALS
SYSTOLIC BLOOD PRESSURE: 123 MMHG | TEMPERATURE: 98 F | HEART RATE: 98 BPM | DIASTOLIC BLOOD PRESSURE: 78 MMHG | RESPIRATION RATE: 17 BRPM | OXYGEN SATURATION: 95 %

## 2023-10-03 LAB
ANION GAP SERPL CALC-SCNC: 10 MMOL/L — SIGNIFICANT CHANGE UP (ref 7–14)
BASOPHILS # BLD AUTO: 0.09 K/UL — SIGNIFICANT CHANGE UP (ref 0–0.2)
BASOPHILS NFR BLD AUTO: 0.8 % — SIGNIFICANT CHANGE UP (ref 0–2)
BCR/ABL BY RT - PCR QUANTITATIVE: SIGNIFICANT CHANGE UP
BUN SERPL-MCNC: 15 MG/DL — SIGNIFICANT CHANGE UP (ref 7–23)
CALCIUM SERPL-MCNC: 9 MG/DL — SIGNIFICANT CHANGE UP (ref 8.4–10.5)
CALRETICULIN INTERPRETATION: SIGNIFICANT CHANGE UP
CHLORIDE SERPL-SCNC: 103 MMOL/L — SIGNIFICANT CHANGE UP (ref 98–107)
CHROM ANALY INTERPHASE BLD FISH-IMP: SIGNIFICANT CHANGE UP
CO2 SERPL-SCNC: 25 MMOL/L — SIGNIFICANT CHANGE UP (ref 22–31)
CREAT SERPL-MCNC: 1.09 MG/DL — SIGNIFICANT CHANGE UP (ref 0.5–1.3)
EGFR: 75 ML/MIN/1.73M2 — SIGNIFICANT CHANGE UP
EOSINOPHIL # BLD AUTO: 6.04 K/UL — HIGH (ref 0–0.5)
EOSINOPHIL NFR BLD AUTO: 53.5 % — HIGH (ref 0–6)
GLUCOSE SERPL-MCNC: 107 MG/DL — HIGH (ref 70–99)
HCT VFR BLD CALC: 42.5 % — SIGNIFICANT CHANGE UP (ref 39–50)
HGB BLD-MCNC: 13.8 G/DL — SIGNIFICANT CHANGE UP (ref 13–17)
IANC: 2.66 K/UL — SIGNIFICANT CHANGE UP (ref 1.8–7.4)
IMM GRANULOCYTES NFR BLD AUTO: 1.2 % — HIGH (ref 0–0.9)
LYMPHOCYTES # BLD AUTO: 1.33 K/UL — SIGNIFICANT CHANGE UP (ref 1–3.3)
LYMPHOCYTES # BLD AUTO: 11.8 % — LOW (ref 13–44)
MAGNESIUM SERPL-MCNC: 2.3 MG/DL — SIGNIFICANT CHANGE UP (ref 1.6–2.6)
MCHC RBC-ENTMCNC: 29.8 PG — SIGNIFICANT CHANGE UP (ref 27–34)
MCHC RBC-ENTMCNC: 32.5 GM/DL — SIGNIFICANT CHANGE UP (ref 32–36)
MCV RBC AUTO: 91.8 FL — SIGNIFICANT CHANGE UP (ref 80–100)
MONOCYTES # BLD AUTO: 1.04 K/UL — HIGH (ref 0–0.9)
MONOCYTES NFR BLD AUTO: 9.2 % — SIGNIFICANT CHANGE UP (ref 2–14)
NEUTROPHILS # BLD AUTO: 2.66 K/UL — SIGNIFICANT CHANGE UP (ref 1.8–7.4)
NEUTROPHILS NFR BLD AUTO: 23.5 % — LOW (ref 43–77)
NRBC # BLD: 0 /100 WBCS — SIGNIFICANT CHANGE UP (ref 0–0)
NRBC # FLD: 0 K/UL — SIGNIFICANT CHANGE UP (ref 0–0)
PHOSPHATE SERPL-MCNC: 3.7 MG/DL — SIGNIFICANT CHANGE UP (ref 2.5–4.5)
PLATELET # BLD AUTO: 335 K/UL — SIGNIFICANT CHANGE UP (ref 150–400)
POTASSIUM SERPL-MCNC: 4.4 MMOL/L — SIGNIFICANT CHANGE UP (ref 3.5–5.3)
POTASSIUM SERPL-SCNC: 4.4 MMOL/L — SIGNIFICANT CHANGE UP (ref 3.5–5.3)
RBC # BLD: 4.63 M/UL — SIGNIFICANT CHANGE UP (ref 4.2–5.8)
RBC # FLD: 14.3 % — SIGNIFICANT CHANGE UP (ref 10.3–14.5)
SODIUM SERPL-SCNC: 138 MMOL/L — SIGNIFICANT CHANGE UP (ref 135–145)
WBC # BLD: 11.3 K/UL — HIGH (ref 3.8–10.5)
WBC # FLD AUTO: 11.3 K/UL — HIGH (ref 3.8–10.5)

## 2023-10-03 PROCEDURE — 99232 SBSQ HOSP IP/OBS MODERATE 35: CPT | Mod: GC

## 2023-10-03 PROCEDURE — 99239 HOSP IP/OBS DSCHRG MGMT >30: CPT

## 2023-10-03 RX ORDER — ATORVASTATIN CALCIUM 80 MG/1
1 TABLET, FILM COATED ORAL
Refills: 0 | DISCHARGE

## 2023-10-03 RX ADMIN — Medication 400 MILLIGRAM(S): at 08:49

## 2023-10-03 RX ADMIN — Medication 5 MILLIGRAM(S): at 13:15

## 2023-10-03 NOTE — PROGRESS NOTE ADULT - ASSESSMENT
66 yo male with HBV (on entecavir) and hx of Follicular Lymphoma in remission (treated with 6 cycles of Rituximab-Bendamustine, now on maintenance Ritxumab) admitted for an upper respiratory infection, found to have a peripheral eosinophilia. Hematology consulted for   eosinophilia.    #Eosinophilia  - Per chart review, pt has had a persistent eosinophilia count since 2021 (ranging 2K-3K), but has never been worked up at his Hematologist's office. Differential includes allergic disorders, infectious (especially parasites), neoplasms, and Hypereosinophilic syndromes.  - B12 wnl.   - Tryptase 10.2, IgE low, troponin, ISAIAH –ve  -  flow cytometry-> Mild absolute monocytosis with >10% monocytes, eosinophilia, no significant neutrophilia, or neutropenia  - Appreciate ID input.    -  Hepatitis and HIV negative. Enterovirus +ve  - parasitic workup-> moderate blastocystis hominis cysts observed in stool-> started metro per ID   - Eosinophilia  likely reactive from the parasitic infection,  will need to r/o primary eosinophilic syndrome.   - Emailed path to f/up on PDGFR A, PDGFR B, FGFR 3, JAK2. Kit mutation-> negative  - Recommended IVIG given that pt may be immunocompromised (Ig levels were low)-> Dr. Mcgill will give him IVIG outpatient  - It's unclear if the respiratory symptoms are due to infection or hypereosinophilia; would consider getting pulm on board-> pt's respiratory status is improving  - Will need to consider steroids to suppress eosinophilia if Ok per ID.      #Follicular Lymphoma  - Pt follows with Dr. Alondra Mcgill ([phone]). Collateral history obtained from Dr. Mcgill's office. Pt was diagnosed with Follicular Lymphoma in early 2022. He was treated with 6 cycles of Rituximab-Bendamustine (March 2022 - August 2022) and transitioned to maintenance Rituximab every 8 weeks (last Dose 8/5/23)  - Please obtain CT Chest/Abdomen/Pelvis for staging scans. Patient had last scans in January 2023 and was due for repeat surveillance scans in July 2023.  - Outpatient follow up with Dr. Mcgill when patient is ready for d/c  - Discussed with Dr. Mcgill that we're awaiting BM Bx result. Advised patient to sign up for patient portal as well so that he can show the result to his primary oncologist on follow up.   - If the result comes back, will email Dr. Mcgill ( [email or web address])

## 2023-10-03 NOTE — CHART NOTE - NSCHARTNOTEFT_GEN_A_CORE
Hematology consulted for peripheral eosinophilia. Briefly, 66 yo male with HBV (on entecavir) and hx of Follicular Lymphoma in remission (treated with 6 cycles of Rituximab-Bendamustine, now on maintenance Ritxumab) admitted for an upper respiratory infection, found to have a peripheral eosinophilia. Hematology consulted for eosinophilia.    #Eosinophilia  Per chart review, pt has had a persistent eosinophilia count since 2021 (ranging 2K-3K), but has never been worked up at his Hematologist's office. Differential includes allergic disorders, infectious (especially parasites), neoplasms, and Hypereosinophilic syndromes.  - Check Vitamin B12, tryptase, IgE, troponin, BNP, ISAIAH  - Please send flow cytometry  - Please get high-resolution CT chest  - Appreciate ID input. Agree with parasitic workup. Please also check HIV and Hepatitis (particularly HBV viral load)    #Follicular Lymphoma  Pt follows with Dr. Alondra Mcgill ([phone]). Collateral history obtained from Dr. Mcgill's office. Pt was diagnosed with Follicular Lymphoma in early 2022. He was treated with 6 cycles of Rituximab-Bendamustine (March 2022 - August 2022) and transitioned to maintenance Rituximab every 8 weeks (last Dose 8/5/23)  - Please obtain CT Chest/Abdomen/Pelvis for staging scans. Patient had last scans in January 2023 and was due for repeat surveillance scans in July 2023.  - Outpatient follow up with Dr. Mcgill.    Case d/w Dr. Moss. Formal consult to follow tomorrow.    Paige Branham M.D.  Hematology and Medical Oncology Fellow  Pager: [phone]  For weekends and evenings (5 pm - 8 am), please page Heme/Onc fellow on call.
Bone marrow bx done on 9/29. Onkosight myeloid panel also sent.   Will follow up on result.  Given that eosinophil count is downtrending, will hold off on steriods for now.
Patient seen and examined this morning. Continues to have cough, but states that it is improving.     PHYSICAL EXAM:  Vital Signs Last 24 Hrs  T(C): 36.9 (10-03-23 @ 10:33)  T(F): 98.5 (10-03-23 @ 10:33), Max: 98.6 (10-02-23 @ 20:46)  HR: 99 (10-03-23 @ 10:33) (93 - 99)  BP: 120/77 (10-03-23 @ 10:33)  BP(mean): --  RR: 18 (10-03-23 @ 10:33) (18 - 19)  SpO2: 96% (10-03-23 @ 10:33) (96% - 98%)  Wt(kg): --    General: man sitting up in bed walking around in NAD, awake and alert  HENMT: MMM  Respiratory: No respiratory distress, CTABL  Cardiovascular: S1,S2; No m/g/r.   Gastrointestinal: Soft, Nontender, Nondistended; +BS.   Extremities: No c/c/e    --------------------------------------    Discussed care with heme-onc, patient does not need to stay in hospital for pathology given improvement in eosinophils.   Hematology spoke with his primary hematologist Dr. Mcgill who is comfortable following up on results.   Spoke with daughter today she plans on calling Dr. Mcgill's office to make appointment for next week.  They are aware that he is also scheduled for pulmonary tele-health f/u later this week.   D/c home today, d/c planning 50 min coordinating care/

## 2023-10-03 NOTE — PROGRESS NOTE ADULT - ATTENDING COMMENTS
Mr. Jade was seen in follow-up today by the hematology team.  His eosinophil count has been declining somewhat.  A bone marrow aspiration and biopsy was performed, but we have no results as yet.  He has no evidence of hypereosinophilic syndrome, which would consist of endorgan damage.  He did have a chronic hypereosinophilia for quite some time and the last October he had high eosinophil counts at the time of his diagnosis of lymphoma.  He has an outside hematologist who he will follow-up with.  We will communicate the results of the bone marrow aspiration and biopsy when they become available, perhaps later this week.  The team feels that he should be discharged, and we do not want to keep him here just for the results of his bone marrow aspiration and biopsy, since we may not recommend any steroid treatment at this time.  I agree with the assessment and plan as stated above in the note from Dr. Valera, hematology fellow  Arvind Cameron MD,  Hematology attending
pt with improving hypoxemia. In immunosuppressed state, found to have peripheral eosinophilia, blastomyces stool infection, questionable pulmonary involvement, also +enter/rhinovirus but cough and sputum production improving, unable to expectorate any significant sputum. Rest SpO2 93-96%, also not hypoxemic with ambulation. Unlikely acute or chronic eosinophilic pna given improvement without steroids; would not treat with steroids at this time. Complete parasitic infectious treatment, f/u with pulm clinic in 4 weeks with repeat CT chest. pulm signing off, please reconsult as needed. Email home [email or web address] for tele visit post-discharge.
Mr. Jade was seen by the hematology team in follow-up today.  He is 67-year-old man with hepatitis B virus on Entecavir with a history of follicular lymphoma, treated with 6 cycles of rituximab and Bendamustine.  He is now receiving maintenance rituximab and is treated at the office of oncologist affiliated with [facility].  He was admitted for an upper respiratory infection, and he was noted to have significant peripheral eosinophilia.    His chart was reviewed, and in December 2022 he was admitted to the hospital at the time of his diagnosis of lymphoma.  He had a significant elevated eosinophil count since 2021 ranging from an absolute value of 5719-6957.  He was found to have moderate Blastocystis hominis cysts observed in his stool and he was started on metronidazole as per infectious diseases.    Eosinophilia can be associated with parasitic infections, but this is excessive, with an absolute eosinophil count of more than 10,000.  Eosinophils are relatively toxic, and there granules contain elastase which can cause cardiac and respiratory damage.  Were concerned about the possibility of a primary eosinophilic syndrome.  Several studies have been requested.  We will await some of the studies and if they are not definitive, he may require a bone marrow aspiration and biopsy.  I agree with the assessment and plan as stated above in the note from Dr. Valera, hematology fellow  Arvind Cameron MD, hematology attending
63 m with non hodgkin's lymphoma on rituxan last 8/28, has had cough, SOB which started a month ago and got worse from dry cough to productive, also has no appetite with intermittent diarrhea, vomiting and abd pain, he just came back from a16 day trip to [facility] but his symptoms started before the trip and was given antibiotics in [facility], initially felt better but then again worsened   here febrile to 100.7  WBC: 14, eso: 9.52  RVP: entero/rhino  CXR clear    immunocompromised pt with non hodgkin's lymphoma on ritixan with a month of cough and SOB, progressively worsening, also just came back from   [facility] but symptoms started  before, here with fever and eosinophilia which was present before but now 9000  entero/rhino pneumonia vs eosinophilic pneumonia  CT with bibasilar pneumonia  the whole picture is not s/o TB but will r/o as pt is from endemic area and a month of symptoms    * f/u the blood cx  * sputum AFB x 3 q 8, one should be early morning  * sputum cx  * zosyn for now  * eosinophilia w/u, f/u strongyloides, toxocara, filaria, coccidioides Ab, histo Ag  * f/u the quantiferon but pt is already immunocompromised so will likely be indeterminate   * f/u with hem/onc and pulm eval  * monitor CBc/diff and CMP    The above assessment and plan was discussed with the primary team    Malou Cortes MD  contact on teams  After 5pm and on weekends call [phone]

## 2023-10-03 NOTE — PROGRESS NOTE ADULT - REASON FOR ADMISSION
resp infection

## 2023-10-03 NOTE — DISCHARGE NOTE NURSING/CASE MANAGEMENT/SOCIAL WORK - PATIENT PORTAL LINK FT
You can access the FollowMyHealth Patient Portal offered by [facility] by registering at the following website: [email or web address]. By joining [employer]’s FollowMyHealth portal, you will also be able to view your health information using other applications (apps) compatible with our system.

## 2023-10-03 NOTE — DISCHARGE NOTE NURSING/CASE MANAGEMENT/SOCIAL WORK - NSDCFUADDAPPT_GEN_ALL_CORE_FT
You are scheduled for a virtual appointment with Dr. Kline on 10/6 at 11:30am. Follow-up care will be established at the time of the virtual appointment.    Telehealth instructions: At the time of your appointment, you will receive a text/email invite for your telehealth session. Please click on the link enter the patient's name. You will then be redirected to a virtual waiting room, where you will wait until the doctor has connected with you.     Please follow-up with Dr. Mcgill your oncologist for further management     Please follow-up with your PCP within 1-2 weeks of discharge

## 2023-10-03 NOTE — PROGRESS NOTE ADULT - SUBJECTIVE AND OBJECTIVE BOX
INTERVAL HPI/OVERNIGHT EVENTS:  Patient S&E at bedside. No o/n events,     VITAL SIGNS:  T(F): 98.4 (10-03-23 @ 17:13)  HR: 98 (10-03-23 @ 17:13)  BP: 123/78 (10-03-23 @ 17:13)  RR: 17 (10-03-23 @ 17:13)  SpO2: 95% (10-03-23 @ 17:13)  Wt(kg): --    PHYSICAL EXAM:    Constitutional: NAD  Eyes: EOMI, sclera non-icteric  Neck: supple  Respiratory: CTAB, no wheezes or crackles   Cardiovascular: RRR  Gastrointestinal: soft, NTND, + BS  Extremities: no cyanosis, clubbing or edema   Neurological: awake and alert      MEDICATIONS  (STANDING):  acyclovir   Oral Tab/Cap 400 milliGRAM(s) Oral two times a day  enoxaparin Injectable 40 milliGRAM(s) SubCutaneous every 24 hours  entecavir 0.5 milliGRAM(s) Oral daily  oxybutynin XL 5 milliGRAM(s) Oral daily  tamsulosin 0.4 milliGRAM(s) Oral at bedtime    MEDICATIONS  (PRN):  acetaminophen     Tablet .. 650 milliGRAM(s) Oral every 6 hours PRN Temp greater or equal to 38C (100.4F), Mild Pain (1 - 3)  albuterol/ipratropium for Nebulization 3 milliLiter(s) Nebulizer every 6 hours PRN Bronchospasm  benzonatate 100 milliGRAM(s) Oral every 8 hours PRN Cough  melatonin 3 milliGRAM(s) Oral at bedtime PRN Insomnia      Allergies    No Known Allergies    Intolerances        LABS:                        13.8   11.30 )-----------( 335      ( 03 Oct 2023 05:55 )             42.5     10-03    138  |  103  |  15  ----------------------------<  107<H>  4.4   |  25  |  1.09    Ca    9.0      03 Oct 2023 05:55  Phos  3.7     10-03  Mg     2.30     10-03        Urinalysis Basic - ( 03 Oct 2023 05:55 )    Color: x / Appearance: x / SG: x / pH: x  Gluc: 107 mg/dL / Ketone: x  / Bili: x / Urobili: x   Blood: x / Protein: x / Nitrite: x   Leuk Esterase: x / RBC: x / WBC x   Sq Epi: x / Non Sq Epi: x / Bacteria: x        RADIOLOGY & ADDITIONAL TESTS:  Studies reviewed.

## 2023-10-03 NOTE — DISCHARGE NOTE NURSING/CASE MANAGEMENT/SOCIAL WORK - NSDCPEFALRISK_GEN_ALL_CORE
For information on Fall & Injury Prevention, visit: [email or web address] OR  [email or web address] OR  https://www.cdc.gov/steadi/patient.html

## 2023-10-03 NOTE — PROGRESS NOTE ADULT - PROVIDER SPECIALTY LIST ADULT
Infectious Disease
Pulmonology
Heme/Onc
Infectious Disease
Heme/Onc
Hospitalist
Infectious Disease
Internal Medicine
Hospitalist
Internal Medicine
Hospitalist
Palliative Care

## 2023-10-04 ENCOUNTER — TRANSCRIPTION ENCOUNTER (OUTPATIENT)
Age: 63
End: 2023-10-04

## 2023-10-04 LAB
JAK2 P.V617F BLD/T QL: SIGNIFICANT CHANGE UP
JAK2 P.V617F BLD/T QL: SIGNIFICANT CHANGE UP
TM INTERPRETATION: SIGNIFICANT CHANGE UP

## 2023-10-05 PROBLEM — Z00.00 ENCOUNTER FOR PREVENTIVE HEALTH EXAMINATION: Status: ACTIVE | Noted: 2023-10-05

## 2023-10-05 LAB — ONKOSIGHT MYELOID SEQUENCE: NORMAL — SIGNIFICANT CHANGE UP

## 2023-10-06 ENCOUNTER — APPOINTMENT (OUTPATIENT)
Dept: PULMONOLOGY | Facility: CLINIC | Age: 63
End: 2023-10-06
Payer: MEDICARE

## 2023-10-06 ENCOUNTER — TRANSCRIPTION ENCOUNTER (OUTPATIENT)
Age: 63
End: 2023-10-06

## 2023-10-06 DIAGNOSIS — R05.9 COUGH, UNSPECIFIED: ICD-10-CM

## 2023-10-06 PROCEDURE — 99203 OFFICE O/P NEW LOW 30 MIN: CPT | Mod: 95

## 2023-10-06 RX ORDER — BUDESONIDE AND FORMOTEROL FUMARATE DIHYDRATE 160; 4.5 UG/1; UG/1
160-4.5 AEROSOL RESPIRATORY (INHALATION) TWICE DAILY
Qty: 1 | Refills: 5 | Status: ACTIVE | COMMUNITY
Start: 2023-10-06 | End: 1900-01-01

## 2023-10-09 ENCOUNTER — APPOINTMENT (OUTPATIENT)
Age: 63
End: 2023-10-09
Payer: MEDICARE

## 2023-10-09 VITALS
HEART RATE: 80 BPM | TEMPERATURE: 97.1 F | RESPIRATION RATE: 16 BRPM | DIASTOLIC BLOOD PRESSURE: 70 MMHG | SYSTOLIC BLOOD PRESSURE: 130 MMHG | OXYGEN SATURATION: 98 %

## 2023-10-09 DIAGNOSIS — N40.0 BENIGN PROSTATIC HYPERPLASIA WITHOUT LOWER URINARY TRACT SYMPMS: ICD-10-CM

## 2023-10-09 DIAGNOSIS — E78.5 HYPERLIPIDEMIA, UNSPECIFIED: ICD-10-CM

## 2023-10-09 DIAGNOSIS — J18.9 PNEUMONIA, UNSPECIFIED ORGANISM: ICD-10-CM

## 2023-10-09 LAB — HEMATOPATHOLOGY REPORT: SIGNIFICANT CHANGE UP

## 2023-10-09 PROCEDURE — 99349 HOME/RES VST EST MOD MDM 40: CPT

## 2023-10-10 PROBLEM — N40.0 BPH (BENIGN PROSTATIC HYPERPLASIA): Status: ACTIVE | Noted: 2023-10-09

## 2023-10-10 PROBLEM — E78.5 HLD (HYPERLIPIDEMIA): Status: ACTIVE | Noted: 2023-10-09

## 2023-10-10 PROBLEM — J18.9 PNEUMONIA: Status: ACTIVE | Noted: 2023-10-06

## 2023-10-10 RX ORDER — ENTECAVIR 0.5 MG/1
0.5 TABLET, FILM COATED ORAL DAILY
Refills: 0 | Status: ACTIVE | COMMUNITY
Start: 2023-10-10

## 2023-10-10 RX ORDER — ACYCLOVIR 400 MG/1
400 TABLET ORAL TWICE DAILY
Refills: 0 | Status: ACTIVE | COMMUNITY
Start: 2023-10-10

## 2023-10-10 RX ORDER — OXYBUTYNIN CHLORIDE 5 MG/1
5 TABLET, EXTENDED RELEASE ORAL
Refills: 0 | Status: ACTIVE | COMMUNITY
Start: 2023-10-10

## 2023-10-10 RX ORDER — ATORVASTATIN CALCIUM 20 MG/1
20 TABLET, FILM COATED ORAL
Refills: 0 | Status: ACTIVE | COMMUNITY
Start: 2023-10-10

## 2023-10-10 RX ORDER — TAMSULOSIN HYDROCHLORIDE 0.4 MG/1
0.4 CAPSULE ORAL
Refills: 0 | Status: ACTIVE | COMMUNITY
Start: 2023-10-10

## 2023-10-10 RX ORDER — ASPIRIN ENTERIC COATED TABLETS 81 MG 81 MG/1
81 TABLET, DELAYED RELEASE ORAL DAILY
Refills: 0 | Status: ACTIVE | COMMUNITY
Start: 2023-10-10

## 2023-10-11 ENCOUNTER — TRANSCRIPTION ENCOUNTER (OUTPATIENT)
Age: 63
End: 2023-10-11

## 2023-10-12 LAB — CHROM ANALY OVERALL INTERP SPEC-IMP: SIGNIFICANT CHANGE UP

## 2023-10-17 ENCOUNTER — TRANSCRIPTION ENCOUNTER (OUTPATIENT)
Age: 63
End: 2023-10-17

## 2023-10-18 ENCOUNTER — APPOINTMENT (OUTPATIENT)
Age: 63
End: 2023-10-18

## 2023-10-18 ENCOUNTER — TRANSCRIPTION ENCOUNTER (OUTPATIENT)
Age: 63
End: 2023-10-18

## 2023-10-19 ENCOUNTER — APPOINTMENT (OUTPATIENT)
Dept: AFTER HOURS CARE | Facility: EMERGENCY ROOM | Age: 63
End: 2023-10-19
Payer: MEDICARE

## 2023-10-19 ENCOUNTER — TRANSCRIPTION ENCOUNTER (OUTPATIENT)
Age: 63
End: 2023-10-19

## 2023-10-19 DIAGNOSIS — R10.9 UNSPECIFIED ABDOMINAL PAIN: ICD-10-CM

## 2023-10-19 PROCEDURE — 99203 OFFICE O/P NEW LOW 30 MIN: CPT | Mod: 95

## 2023-11-02 ENCOUNTER — TRANSCRIPTION ENCOUNTER (OUTPATIENT)
Age: 63
End: 2023-11-02

## 2023-11-07 ENCOUNTER — LABORATORY RESULT (OUTPATIENT)
Age: 63
End: 2023-11-07

## 2023-11-07 ENCOUNTER — APPOINTMENT (OUTPATIENT)
Dept: PULMONOLOGY | Facility: CLINIC | Age: 63
End: 2023-11-07
Payer: MEDICARE

## 2023-11-07 VITALS
DIASTOLIC BLOOD PRESSURE: 79 MMHG | RESPIRATION RATE: 14 BRPM | OXYGEN SATURATION: 96 % | TEMPERATURE: 98.1 F | SYSTOLIC BLOOD PRESSURE: 113 MMHG | HEIGHT: 67 IN | WEIGHT: 145.38 LBS | BODY MASS INDEX: 22.82 KG/M2 | HEART RATE: 87 BPM

## 2023-11-07 DIAGNOSIS — D72.10 EOSINOPHILIA, UNSPECIFIED: ICD-10-CM

## 2023-11-07 PROCEDURE — 99214 OFFICE O/P EST MOD 30 MIN: CPT

## 2023-11-07 RX ORDER — ALBUTEROL SULFATE 90 UG/1
108 (90 BASE) INHALANT RESPIRATORY (INHALATION) EVERY 4 HOURS
Qty: 1 | Refills: 5 | Status: ACTIVE | COMMUNITY
Start: 2023-11-07 | End: 1900-01-01

## 2023-11-07 RX ORDER — FLUTICASONE PROPIONATE 50 UG/1
50 SPRAY, METERED NASAL DAILY
Qty: 1 | Refills: 3 | Status: ACTIVE | COMMUNITY
Start: 2023-11-07 | End: 1900-01-01

## 2023-11-08 LAB
CULTURE RESULTS: SIGNIFICANT CHANGE UP
SPECIMEN SOURCE: SIGNIFICANT CHANGE UP

## 2023-11-09 ENCOUNTER — NON-APPOINTMENT (OUTPATIENT)
Age: 63
End: 2023-11-09

## 2023-11-22 LAB
A ALTERNATA IGE QN: <0.1 KUA/L
A FLAVUS AB FLD QL: NEGATIVE
A FUMIGATUS AB FLD QL: NEGATIVE
A FUMIGATUS IGE QN: <0.1 KUA/L
A NIGER AB FLD QL: NEGATIVE
BASOPHILS # BLD AUTO: 0.09 K/UL
BASOPHILS NFR BLD AUTO: 0.4 %
C ALBICANS IGE QN: <0.1 KUA/L
C HERBARUM IGE QN: <0.1 KUA/L
CAT DANDER IGE QN: <0.1 KUA/L
COMMON RAGWEED IGE QN: <0.1 KUA/L
D FARINAE IGE QN: <0.1 KUA/L
D PTERONYSS IGE QN: <0.1 KUA/L
DEPRECATED A ALTERNATA IGE RAST QL: 0
DEPRECATED A FUMIGATUS IGE RAST QL: 0
DEPRECATED C ALBICANS IGE RAST QL: 0
DEPRECATED C HERBARUM IGE RAST QL: 0
DEPRECATED CAT DANDER IGE RAST QL: 0
DEPRECATED COMMON RAGWEED IGE RAST QL: 0
DEPRECATED D FARINAE IGE RAST QL: 0
DEPRECATED D PTERONYSS IGE RAST QL: 0
DEPRECATED DOG DANDER IGE RAST QL: 0
DEPRECATED M RACEMOSUS IGE RAST QL: 0
DEPRECATED ROACH IGE RAST QL: 0
DEPRECATED TIMOTHY IGE RAST QL: 0
DEPRECATED WHITE OAK IGE RAST QL: 0
DOG DANDER IGE QN: <0.1 KUA/L
EOSINOPHIL # BLD AUTO: 16.16 K/UL
EOSINOPHIL NFR BLD AUTO: 76.1 %
GALACTOMANNAN AG SERPL QL IA: 0.09 INDEX
HCT VFR BLD CALC: 46.1 %
HGB BLD-MCNC: 14.9 G/DL
IMM GRANULOCYTES NFR BLD AUTO: 0.3 %
LYMPHOCYTES # BLD AUTO: 2.09 K/UL
LYMPHOCYTES NFR BLD AUTO: 9.8 %
M RACEMOSUS IGE QN: <0.1 KUA/L
MAN DIFF?: NORMAL
MCHC RBC-ENTMCNC: 31.2 PG
MCHC RBC-ENTMCNC: 32.3 GM/DL
MCV RBC AUTO: 96.4 FL
MONOCYTES # BLD AUTO: 1.06 K/UL
MONOCYTES NFR BLD AUTO: 5 %
NEUTROPHILS # BLD AUTO: 1.77 K/UL
NEUTROPHILS NFR BLD AUTO: 8.4 %
PLATELET # BLD AUTO: 240 K/UL
RBC # BLD: 4.78 M/UL
RBC # FLD: 15.3 %
ROACH IGE QN: <0.1 KUA/L
TIMOTHY IGE QN: <0.1 KUA/L
TOTAL IGE SMQN RAST: 2 KU/L
WBC # FLD AUTO: 21.24 K/UL
WHITE OAK IGE QN: <0.1 KUA/L

## 2023-12-06 ENCOUNTER — APPOINTMENT (OUTPATIENT)
Dept: CT IMAGING | Facility: CLINIC | Age: 63
End: 2023-12-06

## 2024-01-10 ENCOUNTER — APPOINTMENT (OUTPATIENT)
Dept: PULMONOLOGY | Facility: CLINIC | Age: 64
End: 2024-01-10

## 2024-07-31 NOTE — ED PROVIDER NOTE - ST/T WAVE
Chaperone present. 
No ST elevations or depressions. No pathologic T-wave inversions